# Patient Record
Sex: FEMALE | Race: WHITE | Employment: FULL TIME | URBAN - METROPOLITAN AREA
[De-identification: names, ages, dates, MRNs, and addresses within clinical notes are randomized per-mention and may not be internally consistent; named-entity substitution may affect disease eponyms.]

---

## 2017-03-05 ENCOUNTER — HOSPITAL ENCOUNTER (EMERGENCY)
Age: 27
Discharge: HOME | End: 2017-03-06
Attending: EMERGENCY MEDICINE
Payer: COMMERCIAL

## 2017-03-05 DIAGNOSIS — Z20.5 EXPOSURE TO HEPATITIS C: ICD-10-CM

## 2017-03-05 PROCEDURE — 99282 EMERGENCY DEPT VISIT SF MDM: CPT

## 2017-03-06 VITALS
WEIGHT: 155 LBS | OXYGEN SATURATION: 100 % | HEART RATE: 115 BPM | BODY MASS INDEX: 26.46 KG/M2 | HEIGHT: 64 IN | SYSTOLIC BLOOD PRESSURE: 139 MMHG | DIASTOLIC BLOOD PRESSURE: 93 MMHG | TEMPERATURE: 97 F | RESPIRATION RATE: 18 BRPM

## 2017-03-06 PROCEDURE — 90715 TDAP VACCINE 7 YRS/> IM: CPT | Performed by: EMERGENCY MEDICINE

## 2017-03-06 PROCEDURE — 90471 IMMUNIZATION ADMIN: CPT | Performed by: EMERGENCY MEDICINE

## 2017-03-06 PROCEDURE — 6360000002 HC RX W HCPCS: Performed by: EMERGENCY MEDICINE

## 2017-03-06 RX ADMIN — TETANUS TOXOID, REDUCED DIPHTHERIA TOXOID AND ACELLULAR PERTUSSIS VACCINE, ADSORBED 0.5 ML: 5; 2.5; 8; 8; 2.5 SUSPENSION INTRAMUSCULAR at 00:29

## 2017-03-06 ASSESSMENT — ENCOUNTER SYMPTOMS
VOMITING: 0
ABDOMINAL PAIN: 0
SORE THROAT: 0
NAUSEA: 0
CHEST TIGHTNESS: 0
CONSTIPATION: 0
SHORTNESS OF BREATH: 0
DIARRHEA: 0

## 2017-03-15 ENCOUNTER — EMPLOYEE WELLNESS (OUTPATIENT)
Dept: OTHER | Age: 27
End: 2017-03-15

## 2017-03-15 LAB
CHOLESTEROL/HDL RATIO: 2.8
CHOLESTEROL: 150 MG/DL
GLUCOSE BLD-MCNC: 87 MG/DL (ref 70–99)
HDLC SERPL-MCNC: 53 MG/DL
LDL CHOLESTEROL: 81 MG/DL (ref 0–130)
PATIENT FASTING?: YES
TRIGL SERPL-MCNC: 79 MG/DL
VLDLC SERPL CALC-MCNC: NORMAL MG/DL (ref 1–30)

## 2018-03-20 VITALS — WEIGHT: 158 LBS | BODY MASS INDEX: 27.12 KG/M2

## 2018-05-14 ENCOUNTER — EMPLOYEE WELLNESS (OUTPATIENT)
Dept: OTHER | Age: 28
End: 2018-05-14

## 2018-05-14 LAB
CHOLESTEROL/HDL RATIO: 2.7
CHOLESTEROL: 148 MG/DL
GLUCOSE BLD-MCNC: 74 MG/DL (ref 70–99)
HDLC SERPL-MCNC: 54 MG/DL
LDL CHOLESTEROL: 74 MG/DL (ref 0–130)
PATIENT FASTING?: YES
TRIGL SERPL-MCNC: 99 MG/DL
VLDLC SERPL CALC-MCNC: NORMAL MG/DL (ref 1–30)

## 2018-05-21 VITALS — WEIGHT: 153 LBS | BODY MASS INDEX: 26.26 KG/M2

## 2018-11-05 ENCOUNTER — HOSPITAL ENCOUNTER (EMERGENCY)
Age: 28
Discharge: HOME | End: 2018-11-05
Attending: EMERGENCY MEDICINE
Payer: COMMERCIAL

## 2018-11-05 ENCOUNTER — NURSE TRIAGE (OUTPATIENT)
Dept: OTHER | Facility: CLINIC | Age: 28
End: 2018-11-05

## 2018-11-05 VITALS
WEIGHT: 155 LBS | SYSTOLIC BLOOD PRESSURE: 147 MMHG | HEART RATE: 84 BPM | DIASTOLIC BLOOD PRESSURE: 81 MMHG | TEMPERATURE: 98.1 F | OXYGEN SATURATION: 100 % | BODY MASS INDEX: 26.61 KG/M2 | RESPIRATION RATE: 15 BRPM

## 2018-11-05 DIAGNOSIS — S31.41XA LACERATION OF LABIA MAJORA, INITIAL ENCOUNTER: Primary | ICD-10-CM

## 2018-11-05 PROCEDURE — 6370000000 HC RX 637 (ALT 250 FOR IP): Performed by: PHYSICIAN ASSISTANT

## 2018-11-05 PROCEDURE — 99283 EMERGENCY DEPT VISIT LOW MDM: CPT

## 2018-11-05 RX ADMIN — MICROFIBRILLAR COLLAGEN HEMOSTAT POWDER: POWDER at 20:20

## 2018-11-05 ASSESSMENT — ENCOUNTER SYMPTOMS
BACK PAIN: 0
COUGH: 0
SHORTNESS OF BREATH: 0
VOMITING: 0
EYE REDNESS: 0
EYE DISCHARGE: 0
ABDOMINAL PAIN: 0
NAUSEA: 0
SORE THROAT: 0

## 2018-11-06 NOTE — TELEPHONE ENCOUNTER
Reason for Disposition   [1] Bleeding AND [2] won't stop after 10 minutes of direct pressure (using correct technique)    Protocols used: SKIN INJURY-ADULT-      Patient is calling back because the cut on her vagina is still bleeding after applying direct pressure for 10+ minutes. All information documented in this encounter was provided by the caller/patient via the telephone.

## 2018-11-06 NOTE — ED PROVIDER NOTES
[facility] ED  [address] [place] [address]  Phone: [phone]  Fax: [phone]      Attending Physician Attestation    I performed a history and physical examination of the patient and discussed management with the mid level provider. I reviewed the mid level provider's note and agree with the documented findings and plan of care. Any areas of disagreement are noted on the chart. I was personally present for the key portions of any procedures. I have documented in the chart those procedures where I was not present during the key portions. I have reviewed the emergency nurses triage note. I agree with the chief complaint, past medical history, past surgical history, allergies, medications, social and family history as documented unless otherwise noted below. Documentation of the HPI, Physical Exam and Medical Decision Making performed by mid level providers is based on my personal performance of the HPI, PE and MDM. For Physician Assistant/ Nurse Practitioner cases/documentation I have personally evaluated this patient and have completed at least one if not all key elements of the E/M (history, physical exam, and MDM). Additional findings are as noted. CHIEF COMPLAINT       Chief Complaint   Patient presents with    Laceration     vagina         HISTORY OF PRESENT ILLNESS    Macey Loya is a 25 y.o. female who presents complaining of laceration to vaginal region after trimming her hair. Patient relates that it just when some bleeding and the patient came in for evaluation. She relates that it really doesn't hurt too much at this point time. No other concerns at this time. PAST MEDICAL HISTORY    has no past medical history on file. SURGICAL HISTORY      has a past surgical history that includes [place] tooth extraction. CURRENT MEDICATIONS       Previous Medications    No medications on file       ALLERGIES     has No Known Allergies.     FAMILY HISTORY

## 2019-02-22 ENCOUNTER — OFFICE VISIT (OUTPATIENT)
Dept: OBGYN CLINIC | Age: 29
End: 2019-02-22
Payer: COMMERCIAL

## 2019-02-22 VITALS
SYSTOLIC BLOOD PRESSURE: 132 MMHG | WEIGHT: 159.8 LBS | BODY MASS INDEX: 27.28 KG/M2 | HEIGHT: 64 IN | DIASTOLIC BLOOD PRESSURE: 86 MMHG

## 2019-02-22 DIAGNOSIS — Z30.09 ENCOUNTER FOR COUNSELING REGARDING CONTRACEPTION: ICD-10-CM

## 2019-02-22 DIAGNOSIS — Z01.419 ENCOUNTER FOR GYNECOLOGICAL EXAMINATION: Primary | ICD-10-CM

## 2019-02-22 PROCEDURE — 99385 PREV VISIT NEW AGE 18-39: CPT | Performed by: NURSE PRACTITIONER

## 2019-02-22 RX ORDER — NORETHINDRONE ACETATE AND ETHINYL ESTRADIOL 1MG-20(21)
1 KIT ORAL DAILY
Qty: 1 PACKET | Refills: 3 | Status: SHIPPED | OUTPATIENT
Start: 2019-02-22 | End: 2019-05-28 | Stop reason: SDUPTHER

## 2019-02-22 ASSESSMENT — ENCOUNTER SYMPTOMS
ABDOMINAL DISTENTION: 0
COUGH: 0
ABDOMINAL PAIN: 0
CONSTIPATION: 0
SHORTNESS OF BREATH: 0
DIARRHEA: 0
BACK PAIN: 0

## 2019-05-28 ENCOUNTER — OFFICE VISIT (OUTPATIENT)
Dept: OBGYN CLINIC | Age: 29
End: 2019-05-28
Payer: COMMERCIAL

## 2019-05-28 VITALS
BODY MASS INDEX: 27.01 KG/M2 | SYSTOLIC BLOOD PRESSURE: 100 MMHG | DIASTOLIC BLOOD PRESSURE: 66 MMHG | WEIGHT: 158.2 LBS | HEIGHT: 64 IN

## 2019-05-28 DIAGNOSIS — Z30.41 ENCOUNTER FOR SURVEILLANCE OF CONTRACEPTIVE PILLS: ICD-10-CM

## 2019-05-28 DIAGNOSIS — N94.6 DYSMENORRHEA: Primary | ICD-10-CM

## 2019-05-28 PROCEDURE — 99213 OFFICE O/P EST LOW 20 MIN: CPT | Performed by: NURSE PRACTITIONER

## 2019-05-28 RX ORDER — NORETHINDRONE ACETATE AND ETHINYL ESTRADIOL 1MG-20(21)
1 KIT ORAL DAILY
Qty: 1 PACKET | Refills: 9 | Status: SHIPPED | OUTPATIENT
Start: 2019-05-28 | End: 2019-07-14 | Stop reason: SDUPTHER

## 2019-05-28 SDOH — HEALTH STABILITY: MENTAL HEALTH: HOW OFTEN DO YOU HAVE A DRINK CONTAINING ALCOHOL?: 2-4 TIMES A MONTH

## 2019-05-28 SDOH — HEALTH STABILITY: MENTAL HEALTH: HOW MANY STANDARD DRINKS CONTAINING ALCOHOL DO YOU HAVE ON A TYPICAL DAY?: 1 OR 2

## 2019-05-28 ASSESSMENT — ENCOUNTER SYMPTOMS
ABDOMINAL PAIN: 0
ABDOMINAL DISTENTION: 0
CONSTIPATION: 0
DIARRHEA: 0
COUGH: 0
BACK PAIN: 0
SHORTNESS OF BREATH: 0

## 2019-05-28 NOTE — PROGRESS NOTES
Encounter for surveillance of contraceptive pills            Plan:    Refill OCPs  RTO annual exam and prn  No follow-ups on file.         Kelley Jacome, LENNY - CNP

## 2019-06-17 RX ORDER — NORETHINDRONE ACETATE AND ETHINYL ESTRADIOL, AND FERROUS FUMARATE 1MG-20(24)
KIT ORAL
Qty: 28 TABLET | Refills: 0 | Status: SHIPPED | OUTPATIENT
Start: 2019-06-17 | End: 2019-06-19 | Stop reason: SDUPTHER

## 2019-06-19 RX ORDER — NORETHINDRONE ACETATE AND ETHINYL ESTRADIOL AND FERROUS FUMARATE 1MG-20(24)
KIT ORAL
Qty: 28 TABLET | Refills: 9 | Status: SHIPPED | OUTPATIENT
Start: 2019-06-19 | End: 2020-02-24

## 2019-06-19 NOTE — TELEPHONE ENCOUNTER
Patient is requesting a refill on birth control. Patient was just here on 05/28/19 and 02/22/19. Please refill.

## 2019-07-15 RX ORDER — NORETHINDRONE ACETATE AND ETHINYL ESTRADIOL 1MG-20(21)
1 KIT ORAL DAILY
Qty: 1 PACKET | Refills: 9 | Status: SHIPPED | OUTPATIENT
Start: 2019-07-15 | End: 2020-02-24 | Stop reason: SDUPTHER

## 2020-02-24 ENCOUNTER — HOSPITAL ENCOUNTER (OUTPATIENT)
Age: 30
Setting detail: SPECIMEN
Discharge: HOME | End: 2020-02-24
Payer: COMMERCIAL

## 2020-02-24 ENCOUNTER — OFFICE VISIT (OUTPATIENT)
Dept: OBGYN CLINIC | Age: 30
End: 2020-02-24
Payer: COMMERCIAL

## 2020-02-24 VITALS
HEIGHT: 64 IN | BODY MASS INDEX: 28.95 KG/M2 | SYSTOLIC BLOOD PRESSURE: 114 MMHG | DIASTOLIC BLOOD PRESSURE: 76 MMHG | WEIGHT: 169.6 LBS

## 2020-02-24 PROCEDURE — 99395 PREV VISIT EST AGE 18-39: CPT | Performed by: NURSE PRACTITIONER

## 2020-02-24 RX ORDER — NORETHINDRONE ACETATE AND ETHINYL ESTRADIOL 1MG-20(21)
1 KIT ORAL DAILY
Qty: 1 PACKET | Refills: 12 | Status: SHIPPED | OUTPATIENT
Start: 2020-02-24 | End: 2021-01-25

## 2020-02-24 ASSESSMENT — ENCOUNTER SYMPTOMS
COUGH: 0
ABDOMINAL PAIN: 0
DIARRHEA: 0
ABDOMINAL DISTENTION: 0
BACK PAIN: 0
CONSTIPATION: 0
SHORTNESS OF BREATH: 0

## 2020-02-24 NOTE — PROGRESS NOTES
HPI:     Jr Houser a 30 y.o. female who presents today for:  Chief Complaint   Patient presents with    Annual Exam     first pap        HPI  Here for annual exam. Works as an  for [employer]. No children. Works out about 4-5x/week and eats healthy. Has become sexually active in the last year  GYNECOLOGICHISTORY:  MenstrualHistory: Patient's last menstrual period was 02/19/2020. Sexually Transmitted Infections: No  History of Ectopic Pregnancy:No  Menarche: 10  Has had about 4-5 cycles in the last year- previously (prior to pills) were very irregular as well. Durationof cycles: 2-5  Dysmenorrhea: occasionally  Sexually active: yes  Dyspareunia: no    Current Outpatient Medications   Medication Sig Dispense Refill    norethindrone-ethinyl estradiol (JUNEL FE 1/20) 1-20 MG-MCG per tablet Take 1 tablet by mouth daily 1 packet 9     No current facility-administered medications for this visit. No Known Allergies    History reviewed. No pertinent past medical history. Denies family history of breast, ovarian, uterus, colon CA     Past Surgical History:   Procedure Laterality Date    WISDOM TOOTH EXTRACTION       Family History   Problem Relation Age of Onset    High Cholesterol Mother     High Cholesterol Father     Alzheimer's Disease Maternal Grandmother     Heart Attack Maternal Grandfather     No Known Problems Paternal Grandmother     No Known Problems Paternal Grandfather      Social History     Tobacco Use    Smoking status: Never Smoker    Smokeless tobacco: Never Used   Substance Use Topics    Alcohol use: Yes     Frequency: 2-4 times a month     Drinks per session: 1 or 2        Subjective:      Review of Systems   Constitutional: Negative for appetite change and fatigue. HENT: Negative for congestion and hearing loss. Eyes: Negative for visual disturbance. Respiratory: Negative for cough and shortness of breath.     Cardiovascular: Negative for chest pain and Status: She is alert and oriented to person, place, and time. Psychiatric:         Behavior: Behavior normal.         Thought Content: Thought content normal.         Judgment: Judgment normal.           Assessment:     1. Encounter for gynecological examination          Plan:   1. Pap collected. Discussed new pap smear guidelines. Desires re-pap in 1 year. 2. Breast self exam reviewed  3. Calcium and Vitamin D dosing reviewed. 4. Seat belt use reviewed  5. Family planning reviewed.   Birth control ocp    Electronicallysigned by Melvin Melendez on 2/24/2020

## 2020-03-04 LAB — CYTOLOGY REPORT: NORMAL

## 2020-03-07 LAB
HPV SAMPLE: NORMAL
HPV, GENOTYPE 16: NOT DETECTED
HPV, GENOTYPE 18: NOT DETECTED
HPV, HIGH RISK OTHER: NOT DETECTED
HPV, INTERPRETATION: NORMAL
SPECIMEN DESCRIPTION: NORMAL

## 2020-06-19 ENCOUNTER — OFFICE VISIT (OUTPATIENT)
Dept: FAMILY MEDICINE CLINIC | Age: 30
End: 2020-06-19
Payer: COMMERCIAL

## 2020-06-19 VITALS
DIASTOLIC BLOOD PRESSURE: 85 MMHG | OXYGEN SATURATION: 99 % | HEART RATE: 62 BPM | TEMPERATURE: 97.9 F | SYSTOLIC BLOOD PRESSURE: 123 MMHG

## 2020-06-19 PROCEDURE — 99202 OFFICE O/P NEW SF 15 MIN: CPT | Performed by: NURSE PRACTITIONER

## 2020-06-19 RX ORDER — TRIAMCINOLONE ACETONIDE 1 MG/G
CREAM TOPICAL
Qty: 80 G | Refills: 0 | Status: SHIPPED | OUTPATIENT
Start: 2020-06-19 | End: 2021-03-04

## 2020-06-19 RX ORDER — PREDNISONE 20 MG/1
40 TABLET ORAL DAILY
Qty: 10 TABLET | Refills: 0 | Status: SHIPPED | OUTPATIENT
Start: 2020-06-19 | End: 2020-06-24

## 2020-06-19 ASSESSMENT — PATIENT HEALTH QUESTIONNAIRE - PHQ9
SUM OF ALL RESPONSES TO PHQ9 QUESTIONS 1 & 2: 0
1. LITTLE INTEREST OR PLEASURE IN DOING THINGS: 0
2. FEELING DOWN, DEPRESSED OR HOPELESS: 0
SUM OF ALL RESPONSES TO PHQ QUESTIONS 1-9: 0
SUM OF ALL RESPONSES TO PHQ QUESTIONS 1-9: 0

## 2020-06-19 ASSESSMENT — ENCOUNTER SYMPTOMS
COUGH: 0
SHORTNESS OF BREATH: 0
CHEST TIGHTNESS: 0
WHEEZING: 0
RESPIRATORY NEGATIVE: 1

## 2020-06-19 NOTE — PATIENT INSTRUCTIONS
X Pre-Contact Skin Solution, come in lotions, sprays, or towelettes. You put the product on your skin right before you go outdoors. · If you did not use a preventive product and you have had contact with plant oil, clean it off your skin as soon as possible. Use a product such as Tecnu Original Outdoor Skin Cleanser. These products can also be used to clean plant oil from clothing or tools. When should you call for help? Call your doctor now or seek immediate medical care if:  · Your rash gets worse, and you start to feel bad and have a fever, a stiff neck, nausea, and vomiting. · You have signs of infection, such as:  ? Increased pain, swelling, warmth, or redness. ? Red streaks leading from the rash. ? Pus draining from the rash. ? A fever. Watch closely for changes in your health, and be sure to contact your doctor if:  · You have new blisters or bruises, or the rash spreads and looks like a sunburn. · The rash gets worse, or it comes back after nearly disappearing. · You think a medicine you are using is making your rash worse. · Your rash does not clear up after 1 to 2 weeks of home treatment. · You have joint aches or body aches with your rash. Where can you learn more? Go to https://ThisClicks.VOZ. org and sign in to your [employer] account. Enter R240 in the [facility] box to learn more about \"[place], [place], and [place]: Care Instructions. \"     If you do not have an account, please click on the \"Sign Up Now\" link. Current as of: October 31, 2019               Content Version: 12.5  © 2926-2832 Healthwise, Incorporated. Care instructions adapted under license by [facility] ([facility]). If you have questions about a medical condition or this instruction, always ask your healthcare professional. [address]  any warranty or liability for your use of this information.

## 2020-06-24 ENCOUNTER — PATIENT MESSAGE (OUTPATIENT)
Dept: FAMILY MEDICINE CLINIC | Age: 30
End: 2020-06-24

## 2020-06-25 RX ORDER — PREDNISONE 10 MG/1
TABLET ORAL
Qty: 30 TABLET | Refills: 0 | Status: SHIPPED | OUTPATIENT
Start: 2020-06-25 | End: 2021-03-04

## 2020-07-13 ENCOUNTER — EMPLOYEE WELLNESS (OUTPATIENT)
Dept: OTHER | Age: 30
End: 2020-07-13

## 2020-07-13 LAB
CHOLESTEROL/HDL RATIO: 4
CHOLESTEROL: 182 MG/DL
GLUCOSE BLD-MCNC: 73 MG/DL (ref 70–99)
HDLC SERPL-MCNC: 45 MG/DL
LDL CHOLESTEROL: 117 MG/DL (ref 0–130)
PATIENT FASTING?: YES
TRIGL SERPL-MCNC: 99 MG/DL
VLDLC SERPL CALC-MCNC: NORMAL MG/DL (ref 1–30)

## 2020-10-19 VITALS — WEIGHT: 163 LBS | BODY MASS INDEX: 28.42 KG/M2

## 2021-03-04 ENCOUNTER — HOSPITAL ENCOUNTER (OUTPATIENT)
Age: 31
Setting detail: SPECIMEN
Discharge: HOME | End: 2021-03-04
Payer: COMMERCIAL

## 2021-03-04 ENCOUNTER — OFFICE VISIT (OUTPATIENT)
Dept: OBGYN CLINIC | Age: 31
End: 2021-03-04
Payer: COMMERCIAL

## 2021-03-04 VITALS
SYSTOLIC BLOOD PRESSURE: 104 MMHG | DIASTOLIC BLOOD PRESSURE: 74 MMHG | WEIGHT: 166.6 LBS | BODY MASS INDEX: 28.44 KG/M2 | HEIGHT: 64 IN | TEMPERATURE: 98.2 F

## 2021-03-04 DIAGNOSIS — Z01.419 ENCOUNTER FOR GYNECOLOGICAL EXAMINATION: Primary | ICD-10-CM

## 2021-03-04 DIAGNOSIS — Z30.41 ORAL CONTRACEPTIVE PILL SURVEILLANCE: ICD-10-CM

## 2021-03-04 PROCEDURE — 99395 PREV VISIT EST AGE 18-39: CPT | Performed by: NURSE PRACTITIONER

## 2021-03-04 RX ORDER — NORETHINDRONE ACETATE AND ETHINYL ESTRADIOL 1MG-20(21)
KIT ORAL
Qty: 1 PACKET | Refills: 12 | Status: SHIPPED | OUTPATIENT
Start: 2021-03-04 | End: 2021-03-19

## 2021-03-04 ASSESSMENT — ENCOUNTER SYMPTOMS
BACK PAIN: 0
ABDOMINAL PAIN: 0
COUGH: 0
ABDOMINAL DISTENTION: 0
SHORTNESS OF BREATH: 0
CONSTIPATION: 0
DIARRHEA: 0

## 2021-03-04 NOTE — PROGRESS NOTES
HPI:     Kofi Mcintyre a 26 y.o. female who presents today for:  Chief Complaint   Patient presents with    Annual Exam     last pap 2/24/20-ASCUS HPV-     Medication Refill     refill OCP        HPI  Here for annual exam. Works as an  for [employer]. No children. Tries to eat healthy and works out 5x/week. Getting  in June  The patient denies any family member or herself as having a blood clot in their leg, lung, or brain. She denies that any member of her family has had a sudden cardiac death under the age of 47 yo. GYNECOLOGICHISTORY:  MenstrualHistory: Patient's last menstrual period was 02/12/2021. Sexually Transmitted Infections: No  History of Ectopic Pregnancy:No  Menarche: 10  Cycles q  monthly  Durationof cycles: 4-6  Dysmenorrhea: minimal  Sexually active: yes  Dyspareunia: none    Current Outpatient Medications   Medication Sig Dispense Refill    BLISOVI FE 1/20 1-20 MG-MCG per tablet TAKE 1 TABLET BY MOUTH ONE TIME A DAY 1 packet 1     No current facility-administered medications for this visit. No Known Allergies    History reviewed. No pertinent past medical history. Denies family history of breast, ovarian, uterus, colon CA     Past Surgical History:   Procedure Laterality Date    WISDOM TOOTH EXTRACTION       Family History   Problem Relation Age of Onset    High Cholesterol Mother     High Cholesterol Father     Alzheimer's Disease Maternal Grandmother     Heart Attack Maternal Grandfather     No Known Problems Paternal Grandmother     No Known Problems Paternal Grandfather      Social History     Tobacco Use    Smoking status: Never Smoker    Smokeless tobacco: Never Used   Substance Use Topics    Alcohol use: Yes     Frequency: 2-4 times a month     Drinks per session: 1 or 2        Subjective:      Review of Systems   Constitutional: Negative for appetite change and fatigue. HENT: Negative for congestion and hearing loss. Eyes: Negative for visual disturbance. Respiratory: Negative for cough and shortness of breath. Cardiovascular: Negative for chest pain and palpitations. Gastrointestinal: Negative for abdominal distention, abdominal pain, constipation and diarrhea. Genitourinary: Negative for flank pain, frequency, menstrual problem, pelvic pain and vaginal discharge. Musculoskeletal: Negative for back pain. Neurological: Negative for syncope and headaches. Psychiatric/Behavioral: Negative for behavioral problems. Objective:     /74 (Site: Right Upper Arm, Position: Sitting, Cuff Size: Medium Adult)   Temp 98.2 °F (36.8 °C)   Ht 5' 3.5\" (1.613 m)   Wt 166 lb 9.6 oz (75.6 kg)   LMP 02/12/2021   Breastfeeding No   BMI 29.05 kg/m²   Physical Exam  Constitutional:       Appearance: She is well-developed. HENT:      Head: Normocephalic. Eyes:      Extraocular Movements: Extraocular movements intact. Conjunctiva/sclera: Conjunctivae normal.   Neck:      Musculoskeletal: Normal range of motion. Thyroid: No thyromegaly. Trachea: No tracheal deviation. Pulmonary:      Effort: Pulmonary effort is normal. No respiratory distress. Chest:      Breasts: Breasts are symmetrical.         Right: No inverted nipple. Left: No inverted nipple, mass, nipple discharge, skin change or tenderness. Abdominal:      General: There is no distension. Palpations: Abdomen is soft. There is no mass. Tenderness: There is no abdominal tenderness. Genitourinary:     Labia:         Right: No rash or lesion. Left: No rash or lesion. Vagina: No vaginal discharge or tenderness. Cervix: No cervical motion tenderness, discharge or friability. Uterus: Not deviated, not enlarged and not fixed. Adnexa:         Right: No mass, tenderness or fullness. Left: No mass, tenderness or fullness. Musculoskeletal: Normal range of motion. General: No tenderness. Skin:     General: Skin is warm and dry. Neurological:      General: No focal deficit present. Mental Status: She is alert and oriented to person, place, and time. Mental status is at baseline. Psychiatric:         Mood and Affect: Mood normal.         Behavior: Behavior normal.         Thought Content: Thought content normal.         Judgment: Judgment normal.           Assessment:     1. Encounter for gynecological examination    2. Oral contraceptive pill surveillance          Plan:   1. Pap collected. Discussed new pap smear guidelines. Desires re-pap in 1 year. 2. Breast self exam reviewed  3. Calcium and Vitamin D dosing reviewed. 4. Seat belt use reviewed  5. Family planning reviewed.   Birth control ocp    Electronicallysigned by Jadiel Curtis on 3/4/2021

## 2021-03-13 LAB — CYTOLOGY REPORT: NORMAL

## 2021-03-19 RX ORDER — NORETHINDRONE ACETATE AND ETHINYL ESTRADIOL 1MG-20(21)
KIT ORAL
Qty: 1 PACKET | Refills: 1 | Status: SHIPPED | OUTPATIENT
Start: 2021-03-19 | End: 2021-05-14

## 2021-05-14 RX ORDER — NORETHINDRONE ACETATE AND ETHINYL ESTRADIOL 1MG-20(21)
KIT ORAL
Qty: 1 PACKET | Refills: 6 | Status: SHIPPED | OUTPATIENT
Start: 2021-05-14 | End: 2021-12-09

## 2021-07-27 LAB
CHOLESTEROL/HDL RATIO: 3.3
CHOLESTEROL: 184 MG/DL
GLUCOSE BLD-MCNC: 89 MG/DL (ref 70–99)
HDLC SERPL-MCNC: 56 MG/DL
LDL CHOLESTEROL: 105 MG/DL (ref 0–130)
PATIENT FASTING?: YES
TRIGL SERPL-MCNC: 114 MG/DL
VLDLC SERPL CALC-MCNC: NORMAL MG/DL (ref 1–30)

## 2021-12-08 ASSESSMENT — ENCOUNTER SYMPTOMS
SHORTNESS OF BREATH: 0
ABDOMINAL PAIN: 0
DIARRHEA: 0
BACK PAIN: 0
CONSTIPATION: 0
ABDOMINAL DISTENTION: 0
COUGH: 0

## 2021-12-09 ENCOUNTER — OFFICE VISIT (OUTPATIENT)
Dept: OBGYN CLINIC | Age: 31
End: 2021-12-09
Payer: COMMERCIAL

## 2021-12-09 ENCOUNTER — HOSPITAL ENCOUNTER (OUTPATIENT)
Age: 31
Setting detail: SPECIMEN
Discharge: HOME | End: 2021-12-09

## 2021-12-09 VITALS
HEIGHT: 64 IN | WEIGHT: 172.2 LBS | BODY MASS INDEX: 29.4 KG/M2 | SYSTOLIC BLOOD PRESSURE: 118 MMHG | DIASTOLIC BLOOD PRESSURE: 78 MMHG

## 2021-12-09 DIAGNOSIS — Z32.01 POSITIVE PREGNANCY TEST: ICD-10-CM

## 2021-12-09 DIAGNOSIS — Z32.01 POSITIVE PREGNANCY TEST: Primary | ICD-10-CM

## 2021-12-09 LAB
CONTROL: PRESENT
PREGNANCY TEST URINE, POC: POSITIVE

## 2021-12-09 PROCEDURE — 99213 OFFICE O/P EST LOW 20 MIN: CPT | Performed by: NURSE PRACTITIONER

## 2021-12-09 PROCEDURE — 81025 URINE PREGNANCY TEST: CPT | Performed by: NURSE PRACTITIONER

## 2021-12-09 NOTE — PROGRESS NOTES
Cassandra White is a 33 y.o. Opal Prudent at Unknown with Estimated Date of Delivery: None noted. who presents for prenatal care. This is a planned pregnancy : Yes  No LMP recorded. Certain LMP : yes      Pregnancy symptoms include fatigue, breast tenderness, nausea, \"morning sickness\", positive home pregnancy test and frequent urination  nausea without vomiting for 20 days  full time job doing IT for [employer]  Pain Score   0/10  Partner's name- Ad Ny  Relationship with FOB: spouse, living together. Patientdoes intend to breast feed. Pregnancy history fully reviewed. Mother's ethnicity:   Father's ethnicity:    Has had both Covid shots. Planning on getting booster in 2nd trimester      POB:   OB History    Para Term  AB Living   0 0 0 0 0 0   SAB IAB Ectopic Molar Multiple Live Births   0 0 0 0 0 0     Complications from previous pregnancies/deliveries  NA  PGYN: denies STDs; denies abnormal pap smears                      Menses regular yes  Contraception none    PMH:  has no past medical history on file. PSH:  has a past surgical history that includes [place] tooth extraction. FH:family history includes Alzheimer's Disease in her maternal grandmother; Heart Attack in her maternal grandfather; High Cholesterol in her father and mother; No Known Problems in her paternal grandfather and paternal grandmother. SH: denies X 3, family supportive  reports that she has never smoked. She has never used smokeless tobacco. She reports current alcohol use. She reports that she does not use drugs. Review of Systems   Constitutional: Negative for appetite change and fatigue. HENT: Negative for congestion and hearing loss. Eyes: Negative for visual disturbance. Respiratory: Negative for cough and shortness of breath. Cardiovascular: Negative for chest pain and palpitations. Gastrointestinal: Negative for abdominal distention, abdominal pain, constipation and diarrhea. Genitourinary: Negative for flank pain, frequency, menstrual problem, pelvic pain and vaginal discharge. Musculoskeletal: Negative for back pain. Neurological: Negative for syncope and headaches. Psychiatric/Behavioral: Negative for behavioral problems. Physical exam:There were no vitals taken for this visit. General Appearance: alert and oriented to person,place and time, well developed and well- nourished, in no acute distress  Skin: warm and dry, no rash or erythema  Head: normocephalic and atraumatic  Eyes: extraocular eye movements intact, conjunctivae normal  ENT:  external ear and ear canal normal bilaterally, nose without deformity, nasal mucosa normal   Neck: supple and non-tender without mass,   Pulmonary/Chest: pulmonary effort wnl  Abdomen: soft, non-tender, non-distended, no masses or organomegaly  Extremities: no cyanosis, clubbing or edema  Musculoskeletal: normal rangeof motion, no joint swelling, deformity or tenderness  Neurologic: gait, coordination and speech normal  Breast: without skin retraction, dimpling, puckering, nipple discharge or masses. There is no axillary adenopathy      Pelvic: external genitalia WNL's, no rashes, no lesions. Speculum exam: vaginal vault pink, wellrugated, without lesions. No discharge. Cervix without lesions. Bimanual exam: no cervical motion tenderness. Impression: @ Unknown by LMP             There is no problem list on file for this patient. Plan:     The patient was seen full history and physical was completed/reviewed.     - Prenatal labs ordered   - Prenatal vitamins prescription Given   - Problem list reviewed and updated   - NT Screen/Quad Testing and MSAFP Single Marker: undecided, lab undecided   - Role of ultrasound in pregnancy discussed; requests fetal survey, MFM referral not indicated   - Gc/Chlam Cultures & Wet Prep Collected, results ordered   - Pap Smear previuosly  Counseling Completed: The patient was counseled on office policies and she was counseled on termination of pregnancy in the state of [place].  labor precautions were reviewed and she is to contact the office if she experiences vaginal bleeding, leakage of fluid or abdominal pain. The patient was counseled on Toxoplasmosis, HIV, Tobacco Abuse, Group Beta Strep Infections, Cystic Fibrosis,  Labor precautions and Sickle Cell disease. Her medication list was reviewed along with the need to clarify if new medications prescribed or used are okay in pregnancy before taking them. The patient was counseled on the risks of tobacco abuse. Both maternal and fetal. She was instructed to stop smoking if currently using tobacco. Morbidity, mortality, and cessation programs were reviewed. The risks include but are not limited to increased risks of  labor,  delivery, premature rupture of membranes, intrauterine growth restriction, intrauterine fetal demise and abruptio placenta. Secondary smoke risks were also reviewed. Increases in cancer, respiratory problems, and sudden infant death syndrome were reviewed as well. The patient was informed of a 2-4% risk of congenital anomalies in the general population. She was also informed that karyotyping is the only way to evaluate the fetus for genetic problems and genetic lethal anomalies. Chorionic villous sampling, amniocentesis and VeriFi were also discussed with morbidity rates in detail. She undecided the procedure. Route of delivery and counseling on vaginal, operative vaginal, and  sections were completed with the risks of each to both the patient as well as her baby. The possibility of a blood transfusion was discussed as well. The patient was not opposed to receiving a transfusion if needed.      Nuchal translucency/Quad Evaluation and MSAFP single marker testing was reviewed in detail with attention to timing of testing and their windows. For patients beyond the gestational age for Nuchal translucency evaluation Quad testing was recommended. Timing for the Quad test was reviewed. Benefits of the above testing was reviewed. A second trimester amniocentesis was also made available to the patient. Risks, Benefits and non-invasive alternative testing was reviewed. T-dap Vaccine recommendations reviewed with the patient. Patient notified of timing of vaccination 27-36 weeks gestation. Patient aware Vaccine is not Live. - Precautions given for ectopic pregnancy including increased abdominal pain, vaginal bleeding. Patient knows to go to ED should these symptoms occur. No orders of the defined types were placed in this encounter.

## 2021-12-09 NOTE — PATIENT INSTRUCTIONS
Patient Education        Weeks 6 to 10 of Your Pregnancy: Care Instructions  Overview     During the first 6 to 10 weeks of your pregnancy, your body goes through many changes. Your baby grows very quickly, even though you can't feel it yet. You may start to feel different, both in your body and your emotions. Because each pregnancy is unique, there's no right way to feel. You may feel the healthiest you've ever been, or you might feel tired or sick to your stomach (\"morning sickness\"). These early weeks are a time to make healthy choices and to eat the best foods for you and your baby. This is also a good time to think about birth defects testing. These are tests done during pregnancy to look for possible problems with the baby. First-trimester tests for birth defects can be done between 8 and 13 weeks of pregnancy, depending on the test. Talk with your doctor about what kinds of tests are available. Follow-up care is a key part of your treatment and safety. Be sure to make and go to all appointments, and call your doctor if you are having problems. It's also a good idea to know your test results and keep a list of the medicines you take. How can you care for yourself at home? Eat well  · Eat at least 3 meals and 2 healthy snacks every day. Eat fresh, whole foods, including:  ? 7 or more servings of bread, tortillas, cereal, rice, pasta, or oatmeal.  ? 3 or more servings of vegetables, especially leafy green vegetables. ? 2 or more servings of fruits. ? 3 or more servings of milk, yogurt, or cheese. ? 2 or more servings of meat, turkey, chicken, fish, eggs, or dried beans. · Drink plenty of fluids, especially water. Avoid sodas and other sweetened drinks. · Choose foods that have important vitamins for your baby, such as calcium, iron, and folate.   ? Dairy products, tofu, canned fish with bones, almonds, broccoli, dark leafy greens, corn tortillas, and fortified orange juice are good sources of calcium. ? Beef, poultry, liver, spinach, lentils, dried beans, fortified cereals, and dried fruits are rich in iron. ? Dark leafy greens, broccoli, asparagus, liver, fortified cereals, orange juice, peanuts, and almonds are good sources of folate. · Avoid foods that could harm your baby. ? Do not eat raw or undercooked meat, chicken, or fish (such as sushi or raw oysters). ? Do not eat raw eggs or foods that contain raw eggs, such as Caesar dressing. ? Do not eat soft cheeses and unpasteurized dairy foods, such as Brie, feta, or blue cheese. ? Do not eat fish that contains a lot of mercury, such as shark, swordfish, tilefish, or tess mackerel. Limit some other types of fish, such as white (albacore) tuna, to 4 oz (0.1 kg) a week. ? Do not eat raw sprouts, especially alfalfa sprouts. ? Cut down on caffeine, such as coffee, tea, and cola. Protect yourself and your baby  · Do not touch regulo litter or cat feces. They can cause an infection that could harm your baby. · High body temperature can be harmful to your baby. So if you want to use a sauna or hot tub, be sure to talk to your doctor about how to use it safely. Delta with morning sickness  · Sip small amounts of water, juices, or shakes. Try drinking between meals, not with meals. · Eat 5 or 6 small meals a day. Try dry toast or crackers when you first get up, and eat breakfast a little later. · Avoid spicy, greasy, and fatty foods. · When you feel sick, open your windows or go for a short walk to get fresh air. · Try nausea wristbands. These help some people. · Tell your doctor if you think your prenatal vitamins make you sick. Where can you learn more? Go to https://foreign.healthZoweeTV. org and sign in to your [employer] account. Enter G112 in the [employer] box to learn more about \"Weeks 6 to 10 of Your Pregnancy: Care Instructions. \"     If you do not have an account, please click on the \"Sign Up Now\" link.   Current as of: June 16, 2021               Content Version: 13.0  © 4136-2096 Healthwise, Incorporated. Care instructions adapted under license by [facility]. If you have questions about a medical condition or this instruction, always ask your healthcare professional. [address] any warranty or liability for your use of this information.

## 2021-12-10 LAB
C TRACH DNA GENITAL QL NAA+PROBE: NEGATIVE
DIRECT EXAM: ABNORMAL
Lab: ABNORMAL
N. GONORRHOEAE DNA: NEGATIVE
SPECIMEN DESCRIPTION: ABNORMAL
SPECIMEN DESCRIPTION: NORMAL

## 2021-12-10 RX ORDER — CLINDAMYCIN PHOSPHATE 20 MG/G
CREAM VAGINAL
Qty: 1 EACH | Refills: 0 | Status: SHIPPED | OUTPATIENT
Start: 2021-12-10 | End: 2022-01-21

## 2021-12-11 ENCOUNTER — HOSPITAL ENCOUNTER (OUTPATIENT)
Age: 31
Discharge: HOME | End: 2021-12-11
Payer: COMMERCIAL

## 2021-12-11 DIAGNOSIS — Z32.01 POSITIVE PREGNANCY TEST: ICD-10-CM

## 2021-12-11 LAB
ABO/RH: NORMAL
ABSOLUTE EOS #: 0.21 K/UL (ref 0–0.44)
ABSOLUTE IMMATURE GRANULOCYTE: 0.05 K/UL (ref 0–0.3)
ABSOLUTE LYMPH #: 2.17 K/UL (ref 1.1–3.7)
ABSOLUTE MONO #: 0.59 K/UL (ref 0.1–1.2)
AMPHETAMINE SCREEN URINE: NEGATIVE
ANTIBODY SCREEN: NEGATIVE
BARBITURATE SCREEN URINE: NEGATIVE
BASOPHILS # BLD: 1 % (ref 0–2)
BASOPHILS ABSOLUTE: 0.06 K/UL (ref 0–0.2)
BENZODIAZEPINE SCREEN, URINE: NEGATIVE
BILIRUBIN URINE: NEGATIVE
BUPRENORPHINE URINE: NORMAL
CANNABINOID SCREEN URINE: NEGATIVE
COCAINE METABOLITE, URINE: NEGATIVE
COLOR: YELLOW
COMMENT UA: NORMAL
DIFFERENTIAL TYPE: ABNORMAL
EOSINOPHILS RELATIVE PERCENT: 2 % (ref 1–4)
GLUCOSE URINE: NEGATIVE
HCT VFR BLD CALC: 42.5 % (ref 36.3–47.1)
HEMOGLOBIN: 13.6 G/DL (ref 11.9–15.1)
HEPATITIS B SURFACE ANTIGEN: NONREACTIVE
HEPATITIS C ANTIBODY: NONREACTIVE
HIV AG/AB: NONREACTIVE
IMMATURE GRANULOCYTES: 1 %
KETONES, URINE: NEGATIVE
LEUKOCYTE ESTERASE, URINE: NEGATIVE
LYMPHOCYTES # BLD: 22 % (ref 24–43)
MCH RBC QN AUTO: 28.2 PG (ref 25.2–33.5)
MCHC RBC AUTO-ENTMCNC: 32 G/DL (ref 28.4–34.8)
MCV RBC AUTO: 88.2 FL (ref 82.6–102.9)
MDMA URINE: NORMAL
METHADONE SCREEN, URINE: NEGATIVE
METHAMPHETAMINE, URINE: NORMAL
MONOCYTES # BLD: 6 % (ref 3–12)
NITRITE, URINE: NEGATIVE
NRBC AUTOMATED: 0 PER 100 WBC
OPIATES, URINE: NEGATIVE
OXYCODONE SCREEN URINE: NEGATIVE
PDW BLD-RTO: 11.9 % (ref 11.8–14.4)
PH UA: 7.5 (ref 5–8)
PHENCYCLIDINE, URINE: NEGATIVE
PLATELET # BLD: 292 K/UL (ref 138–453)
PLATELET ESTIMATE: ABNORMAL
PMV BLD AUTO: 9.4 FL (ref 8.1–13.5)
PROPOXYPHENE, URINE: NORMAL
PROTEIN UA: NEGATIVE
RBC # BLD: 4.82 M/UL (ref 3.95–5.11)
RBC # BLD: ABNORMAL 10*6/UL
RUBV IGG SER QL: 125.9 IU/ML
SEG NEUTROPHILS: 68 % (ref 36–65)
SEGMENTED NEUTROPHILS ABSOLUTE COUNT: 6.86 K/UL (ref 1.5–8.1)
SPECIFIC GRAVITY UA: 1.01 (ref 1–1.03)
T. PALLIDUM, IGG: NONREACTIVE
TEST INFORMATION: NORMAL
TRICYCLIC ANTIDEPRESSANTS, UR: NORMAL
TURBIDITY: CLEAR
URINE HGB: NEGATIVE
UROBILINOGEN, URINE: NORMAL
WBC # BLD: 9.9 K/UL (ref 3.5–11.3)
WBC # BLD: ABNORMAL 10*3/UL

## 2021-12-11 PROCEDURE — 86762 RUBELLA ANTIBODY: CPT

## 2021-12-11 PROCEDURE — 36415 COLL VENOUS BLD VENIPUNCTURE: CPT

## 2021-12-11 PROCEDURE — 86780 TREPONEMA PALLIDUM: CPT

## 2021-12-11 PROCEDURE — 85025 COMPLETE CBC W/AUTO DIFF WBC: CPT

## 2021-12-11 PROCEDURE — 81003 URINALYSIS AUTO W/O SCOPE: CPT

## 2021-12-11 PROCEDURE — 86803 HEPATITIS C AB TEST: CPT

## 2021-12-11 PROCEDURE — 81220 CFTR GENE COM VARIANTS: CPT

## 2021-12-11 PROCEDURE — 86901 BLOOD TYPING SEROLOGIC RH(D): CPT

## 2021-12-11 PROCEDURE — 80307 DRUG TEST PRSMV CHEM ANLYZR: CPT

## 2021-12-11 PROCEDURE — 87389 HIV-1 AG W/HIV-1&-2 AB AG IA: CPT

## 2021-12-11 PROCEDURE — 86850 RBC ANTIBODY SCREEN: CPT

## 2021-12-11 PROCEDURE — 87086 URINE CULTURE/COLONY COUNT: CPT

## 2021-12-11 PROCEDURE — 87340 HEPATITIS B SURFACE AG IA: CPT

## 2021-12-11 PROCEDURE — 86900 BLOOD TYPING SEROLOGIC ABO: CPT

## 2021-12-12 LAB
CULTURE: NORMAL
Lab: NORMAL
SPECIMEN DESCRIPTION: NORMAL

## 2021-12-14 LAB — CYSTIC FIBROSIS: NORMAL

## 2021-12-16 ENCOUNTER — INITIAL PRENATAL (OUTPATIENT)
Dept: OBGYN CLINIC | Age: 31
End: 2021-12-16

## 2021-12-16 DIAGNOSIS — Z3A.01 7 WEEKS GESTATION OF PREGNANCY: Primary | ICD-10-CM

## 2021-12-16 DIAGNOSIS — Z34.91 NORMAL PREGNANCY IN FIRST TRIMESTER: ICD-10-CM

## 2021-12-16 PROCEDURE — 0500F INITIAL PRENATAL CARE VISIT: CPT | Performed by: NURSE PRACTITIONER

## 2021-12-16 NOTE — PROGRESS NOTES
Relationship with FOB: , living together, no other children  Partner's name: Derick Tan \"Dimitry\"  Plans to Breast feeding  Pain Score:0/10  Job title: working IT at [employer]  This is a planned pregnancy:Yes  Certain LMP:Yes 64/14/48  S/S of pregnancy:Yes Didn't feel right, nauseated and 2wk late(which is normal for her)  Hx N/V pregnancy: nausea and no emesis       Mother's ethnicity:    Father's ethnicity:     -FHR US today 161  There is no problem list on file for this patient. Last menstrual period 10/09/2021, not currently breastfeeding. Fabiola aWlton is a 33 y.o. Valeria Chum, here for her ACOG. The patients past medical, surgical, social and family history were reviewed. Current medications and allergies were reviewed, and documented in the chart. Menstrual history: normally irregular  Birth control: BCP    Wt Readings from Last 3 Encounters:   12/09/21 172 lb 3.2 oz (78.1 kg)   03/04/21 166 lb 9.6 oz (75.6 kg)   07/13/20 163 lb (73.9 kg)     Recent Results (from the past 8736 hour(s))   GYN Cytology    Collection Time: 03/04/21 11:01 AM   Result Value Ref Range    Cytology Report       INTERPRETATION    Cervical material, (ThinPrep vial, Imaging-assisted review):  Specimen Adequacy:       Satisfactory for evaluation.       - Endocervical/transformation zone component present. Descriptive Diagnosis:       Negative for intraepithelial lesion or malignancy. Shift in arti suggestive of bacterial vaginosis. Cytotechnologist:   Whitney Alves.  ALEXA Zhu(ASCP)  **Electronically Signed Out**  mary/3/13/2021            Source:  1: Cervical material, (ThinPrep vial, Imaging-assisted review)    Clinical History  Z01.419 Routine gyn exam without abnormal findings  High risk HPV DNA testing is requested if the diagnosis is abnormal    GYNECOLOGIC CYTOLOGY REPORT    Patient Name: Joe Pap: [id number]  Path Number: [id number]  [place] [address] [address] [place], [address]  [phone]  Fax: [phone]     [facility] Health Screen    Collection Time: 07/27/21  7:35 AM   Result Value Ref Range    Glucose 89 70 - 99 mg/dL    Cholesterol 184 <200 mg/dL    HDL 56 >40 mg/dL    LDL Cholesterol 105 0 - 130 mg/dL    Chol/HDL Ratio 3.3 <5    Triglycerides 114 <150 mg/dL    VLDL NOT REPORTED 1 - 30 mg/dL    Patient Fasting? YES    POCT urine pregnancy    Collection Time: 12/09/21  8:00 AM   Result Value Ref Range    Preg Test, Ur POSITIVE     Control present    C.trachomatis N.gonorrhoeae DNA    Collection Time: 12/09/21 11:26 PM    Specimen: Cervix   Result Value Ref Range    Specimen Description . CERVIX     C. trachomatis DNA NEGATIVE NEGATIVE    N. gonorrhoeae DNA NEGATIVE NEGATIVE   VAGINITIS DNA PROBE    Collection Time: 12/09/21 11:26 PM    Specimen: Vaginal   Result Value Ref Range    Specimen Description . VAGINA     Special Requests NOT REPORTED     Direct Exam POSITIVE for Gardnerella vaginalis. (A)     Direct Exam NEGATIVE for Candida sp. Direct Exam NEGATIVE for Trichomonas vaginalis     Direct Exam       Method of testing is a DNA probe intended for detection and identification of Candida species, Gardnerella vaginalis, and Trichomonas vaginalis nucleic acid in vaginal fluid specimens from patients with symptoms of vaginitis/vaginosis.    Cystic Fibrosis    Collection Time: 12/11/21 12:00 AM   Result Value Ref Range    Cystic Fibrosis       Specimen(s) Received:  Peripheral blood, CF  Clinical Information:  Patient History Unknown  Prenatal Screening for Cystic Fibrosis    RESULTS:  ** This patient is negative for all CF mutations analyzed **  Due to the complex nature of this testing, genetic counseling is  recommended  This interpretation is based on the assumption that this individual  has a negative personal and family history for cystic fibrosis    INTERPRETATION:    Using the methods described, this patient tested  negative for all 60 mutations screened, including those recommended by  the [employer] of [employer]stetricians and Gynecologists and the  [employer]. These results do not rule out  the possibility that this individual could carry a CF mutation not  detected by this test.  The patient should understand that DNA studies  do not constitute a definitive carrier test for cystic fibrosis in all  individuals. Thus, interpretation is given as a probability (see  below). Accurate risk c alculation requires accurate family history  information. Inaccurate reporting of a family history of cystic  fibrosis will lead to errors in residual risk assessment. It should  be realized that there are many sources of diagnostic error in  molecular testing which may include trace contamination of PCR  reactions, rare genetic variants that can interfere with the analysis,  or general laboratory error. Cystic Fibrosis Carrier Rate by Racial and Ethnic Group, Before and  After Screening  [from Junior et al (2001):  [place] in Med 3(2), 149-154]                                  Estimated Carrier Risk          Ethnic Group               Detection Rate                     Before Test          After Negative Test       Ashkenazi Adventism                    97%               1/29 1/930                       90%               1/29 1/240                      69%               1/65 1/207       * American                 57%               1/46 1/105   American                    No data available          1/90           No data available     *Additional information required to accurately predict risk  Note:  Residual carrier risk after negative test is modified by  presence of positive family history of CF or by admixture of ethnic  groups.   For these situations, accurate risk assessment requires Bayesian  analysis and genetic counseling    This molecular test has been approved for in vitro diagnostic use by  the U.S. FDA. This test is used for clinical purposes. Pursuant to  the requirements of CLIA '88, this laboratory has established and  verified the test's accuracy and precision. It should not be regarded  as investigational or for research. This laboratory is certified  under the [address] (CLIA  '88) as qualified to perform high complexity clinical laboratory  testing. Methodology:  Samples are tested for the prese nce of wild type or  mutant gene sequences in the CFTR gene by the [employer] Cystic  Fibrosis 60 Kit. It is comprised of a single multiplex PCR reaction  that is then used in two separate Allele Specific Primer Extension  reactions, which are followed by two separate bead hybridization  reactions. The Golden Gekkoe Assay screens for 60 CFTR mutations,  including the standard 23 mutation core panel recommended by the  [employer] of Obstetricians and Gynecologists (ACOG) and the  [address] ([facility]):  , , G542X,  G85E, R117H, 621+1G>T, 711+1G>T, I0491M, R334W, R347P, A455E,  1717-1G>A, R560T, R553X, G551D, 1898+1G>A, 2184delA, 2789+5G>A,  3120+1G>A, Z4344X, 3659delC, 3849+10kbC>T, K2373I, 1078delT, 394delTT,  Y122X, R347H, V520F, A559T, S549N, S549R, 1898+5G>T, 2183AA>G,  2307insA, Q1912R, U2880X, G8064M, 3876delA, 3905insT, CFTRdele2,3,  E60X, R75X, 406-1G>A, G178R, [id number], L206W, 935delA, G330X, Q493X,  Q890X, 1677delTA, 2424zhz0>A, 2143delT, K 710X, R8581F, 1179oyx4,  F5072D, [id number], S3943Z, 3791delC and variants 5T/7T/9T, F508C, I507V,  I506V. For samples positive for R117H, the IVS-8 Poly T variant is  analyzed.             **Electronically Signed Out**   Remy Aguilar M.D.         Beni Chinchilla  [address] [place] [address] Urine NEGATIVE NEGATIVE    Methadone Screen, Urine NEGATIVE NEGATIVE    Opiates, Urine NEGATIVE NEGATIVE    Phencyclidine, Urine NEGATIVE NEGATIVE    Propoxyphene, Urine NOT REPORTED NEGATIVE    Cannabinoid Scrn, Ur NEGATIVE NEGATIVE    Oxycodone Screen, Ur NEGATIVE NEGATIVE    Methamphetamine, Urine NOT REPORTED NEGATIVE    Tricyclic Antidepressants, Urine NOT REPORTED NEGATIVE    MDMA, Urine NOT REPORTED NEGATIVE    Buprenorphine Urine NOT REPORTED NEGATIVE    Test Information       Assay provides medical screening only. The absence of expected drug(s) and/or metabolite(s) may indicate diluted or adulterated urine, limitations of testing or timing of collection. Culture, Urine    Collection Time: 12/11/21  9:13 AM    Specimen: Urine, clean catch   Result Value Ref Range    Specimen Description . CLEAN CATCH URINE     Special Requests NOT REPORTED     Culture NO SIGNIFICANT GROWTH    Urinalysis    Collection Time: 12/11/21  9:13 AM   Result Value Ref Range    Color, UA Yellow Yellow    Turbidity UA Clear Clear    Glucose, Ur NEGATIVE NEGATIVE    Bilirubin Urine NEGATIVE NEGATIVE    Ketones, Urine NEGATIVE NEGATIVE    Specific Gravity, UA 1.010 1.005 - 1.030    Urine Hgb NEGATIVE NEGATIVE    pH, UA 7.5 5.0 - 8.0    Protein, UA NEGATIVE NEGATIVE    Urobilinogen, Urine Normal Normal    Nitrite, Urine NEGATIVE NEGATIVE    Leukocyte Esterase, Urine NEGATIVE NEGATIVE    Urinalysis Comments       Microscopic exam not performed based on chemical results unless requested in original order.    T. pallidum Ab    Collection Time: 12/11/21  9:13 AM   Result Value Ref Range    T. pallidum, IgG NONREACTIVE NONREACTIVE   Rubella antibody, IgG    Collection Time: 12/11/21  9:13 AM   Result Value Ref Range    Rubella Antibody, .9 IU/mL       Past Medical History:   Diagnosis Date    Asthma     as child    Seasonal allergies     Stress incontinence 2021    pregnancy Past Surgical History:   Procedure Laterality Date    WISDOM TOOTH EXTRACTION       Family History   Problem Relation Age of Onset    High Cholesterol Mother     High Cholesterol Father     Hypertension Father 39    No Known Problems Brother     Alzheimer's Disease Maternal Grandmother     Heart Attack Maternal Grandfather 43    No Known Problems Paternal Grandmother     Hypotension Paternal Grandfather      Social History     Tobacco Use   Smoking Status Never Smoker   Smokeless Tobacco Never Used     Social History     Substance and Sexual Activity   Alcohol Use Not Currently       MEDICATIONS:  Current Outpatient Medications   Medication Sig Dispense Refill    clindamycin (CLEOCIN) 2 % vaginal cream Place vaginally nightly x 7 days 1 each 0    Prenatal Vit-Fe Fumarate-FA (PRENATAL PO) Take by mouth       No current facility-administered medications for this visit. ALLERGIES:  Patient has no known allergies. Reviewed global and practice OB care including nausea measures, nutrition, activities, warning signs, and contact information.  Offered cell free DNA screen,NT echo and WIC .    `--------------------------------------------------------------------------  Genetic Screening/Teratology Counseling  (Include patient, FOB or anyone in either family)    1) Patient's age 28 years or > at [place]: No  2) Thalassemia (Mediterranean, ): No  3) Neural Tube Defect:   No  4) Congenital heart defect:   No  5) Trisomy (e.g. Down Syndrome):  No  6) El-sachs (Amish, [address]): No  7) Multiple Births:    No  8) Sickle cell (disease or trait):  No  9) Hemophilia or blood disorders:  No  10) Muscular Dystrophy:   No  11) Cystic Fibrosis:    No  12) Sakina's chorea:   No  13) Mental retardation/Autism :  No   If yes, was person tested for fragile X: No  14) Other inherited genetic/chromosomal disorder: No  15) Maternal metabolic disorder (DM, PKU): No  16) Child with birth defect not listed:  No  17) Recurrent pregnancy loss/stillbirth: No  18) Medications, supplements/illicit or   Recreational drugs/alcohol since LMP: No   List: none  19) Any other:   none    Comments/Counseling: declining all genetic testing.   -pt aware referral is being placed for anatomy scan at [facility] at 20 wks.   -------------------------------------------------------------------------  Infection History:    1) Live with someone with TB/exposed to TB: No  2) Patient/partner has h/o genital herpes: No  3) Rash/viral illness since LMP:  Yes after thanksgiving, fever and coughed day. Lost voice week.    4) History of STD:    No  5) Other: NA  -------------------------------------------------------------------------

## 2021-12-16 NOTE — PATIENT INSTRUCTIONS
Patient Education        Weeks 10 to 14 of Your Pregnancy: Care Instructions  Overview     By weeks 10 to 15 of your pregnancy, the placenta has formed inside your uterus. The placenta's main job is to give your baby oxygen and nutrients through the umbilical cord. It's possible to hear your baby's heartbeat with a special ultrasound device. Your baby's organs are developing. The arms and legs can bend. This is a good time to think about testing for birth defects. There are two types of tests: screening and diagnostic. Screening tests show the chance that a baby has a certain birth defect. They can't tell you for sure that your baby has a problem. Diagnostic tests show if a baby has a certain birth defect. It's your choice whether to have these tests. You and your partner can talk to your doctor or midwife about tests for birth defects. Follow-up care is a key part of your treatment and safety. Be sure to make and go to all appointments, and call your doctor if you are having problems. It's also a good idea to know your test results and keep a list of the medicines you take. How can you care for yourself at home? Decide about tests  · You can have screening tests and diagnostic tests to check for birth defects. The decision to have a test for birth defects is personal. Think about your age, your chance of passing on a family disease, your need to know about any problems, and what you might do after you have the test results. ? Quadruple (quad) blood test. This screening test can be done between 15 and 22 weeks of pregnancy. It checks the amount of four substances in your blood. The doctor looks at these test results, along with your age and other factors, to find out the chance that your baby may have certain problems. ? Amniocentesis. This diagnostic test is used to look for chromosomal problems in the baby's cells.  It can be done between 15 and 20 weeks of pregnancy, usually around week 16.  ? Nuchal translucency test. This test uses ultrasound to measure the thickness of the area at the back of the baby's neck. An increase in the thickness can be an early sign of Down syndrome. ? Chorionic villus sampling (CVS). This is a test that looks for certain genetic problems with your baby. The same genes that are in your baby are in the placenta. A small piece of the placenta is taken out and tested. This test is done when you are 10 to 13 weeks pregnant. Ease discomfort  · Slow down and take naps when you feel tired. · If your emotions swing, talk to someone. · If your gums bleed, try a softer toothbrush. If your gums are puffy and bleed a lot, see your dentist.  · If you feel dizzy:  ? Get up slowly after sitting or lying down. ? Drink plenty of fluids. ? Eat small snacks to keep your blood sugar stable. ? Put your head between your legs as though you were tying your shoelaces. ? Lie down with your legs higher than your head. Use pillows to prop up your feet. · If you have a headache:  ? Lie down. ? Ask your partner or a good friend for a neck massage. ? Try cool cloths over your forehead or across the back of your neck. ? Use acetaminophen (Tylenol) for pain relief. Do not use nonsteroidal anti-inflammatory drugs (NSAIDs), such as ibuprofen (Advil, Motrin) or naproxen (Aleve), unless your doctor says it is okay. · If you have a nosebleed, pinch your nose gently, and hold it for a short while. To prevent nosebleeds, try massaging a small dab of petroleum jelly, such as Vaseline, in your nostrils. · If your nose is stuffed up, try saline (saltwater) nose sprays. Do not use decongestant sprays. Care for your breasts  · Wear a bra that gives you good support. · Know that changes in your breasts are normal.  ? Your breasts may get larger and more tender. Tenderness usually gets better by 12 weeks. ? Your nipples may get darker and larger, and small bumps around your nipples may show more. ?  The veins in your chest and breasts may show more. Where can you learn more? Go to https://chpepiceweb.[employer] org and sign in to your Adyen account. Enter W477 in the [facility] box to learn more about \"Weeks 10 to 14 of Your Pregnancy: Care Instructions. \"     If you do not have an account, please click on the \"Sign Up Now\" link. Current as of: June 16, 2021               Content Version: 13.0  © 3465-6996 [employer]. Care instructions adapted under license by [facility] [facility] ([facility]). If you have questions about a medical condition or this instruction, always ask your healthcare professional. [place] [address] any warranty or liability for your use of this information. Patient Education        Learning About Birth Defects Testing  What is birth defects testing? Birth defects testing is done during pregnancy to look for possible problems with the baby (fetus). A birth defect may have only a minor impact on a child's life. Or it can have a major effect on quality or length of life. You and your doctor can choose from many tests. You may have no tests, one test, or many tests. What are the types of tests? You may have a screening test, a diagnostic test, or both. Screening tests show the chance that a baby has a certain birth defect, such as Down syndrome, spina bifida, or trisomy 25. Diagnostic tests show if a baby has a certain birth defect. · Screening tests and when they're done:  ? Blood tests at 10 to 13 weeks (first trimester)  ? Cell-free fetal DNA test at 10 weeks or later (first trimester)  ? Nuchal translucency test at 10 to 13 weeks (first trimester)  ? Quad screening at 15 to 22 weeks (second trimester)  ? Ultrasound at 18 to 22 weeks (second trimester)  · Diagnostic tests and when they're done:  ? Chorionic villus sampling (CVS) at 10 to 13 weeks (first trimester)  ?  Amniocentesis at 13 to 20 weeks (second trimester)  Sometimes doctors will look at the combined screenings that you've had over a period of time. This is called an integrated screening. What are the screening tests? · Blood tests are done to look at different substances in your blood. These tests include cell free fetal DNA and quadruple (quad) blood tests. Both of these tests can help find genetic problems. · Nuchal translucency test uses ultrasound to measure the thickness of the area at the back of the baby's neck. An increase in thickness can be an early sign of certain birth defects. Ultrasound is a tool that uses sound waves to make pictures of your baby and placenta inside the uterus. · Ultrasound lets your doctor see an image of your baby. It can help your doctor look for problems of the heart, spine, belly, or other areas. What are the diagnostic tests? Chorionic villus sampling (CVS) looks at cells from the placenta. To do the test, your doctor may put a thin tube through your vagina and cervix to take out a small piece of the placenta. Or the doctor may take out the piece through a needle in your belly. This test can diagnose many genetic diseases. But it can't find problems with the spinal cord. Amniocentesis looks at the amniotic fluid that surrounds your baby. Your doctor will put a needle through your belly into your uterus and take out a very small amount of fluid to test.  What are the risks of these tests? There is a small risk of a miscarriage after a CVS or amniocentesis. Your doctor or genetic counselor can help you understand this risk. These tests are generally very safe. Where can you learn more? Go to https://Pressflipkathy.TBS. org and sign in to your B[employer]ow account. Enter G030 in the [employer] box to learn more about \"Learning About Birth Defects Testing. \"     If you do not have an account, please click on the \"Sign Up Now\" link. Current as of: June 16, 2021               Content Version: 13.0  © 8793-5074 [employer].    Care instructions adapted under license by [facility] ([facility]). If you have questions about a medical condition or this instruction, always ask your healthcare professional. [address] any warranty or liability for your use of this information.

## 2022-01-20 NOTE — PATIENT INSTRUCTIONS
Patient Education        Weeks 10 to 14 of Your Pregnancy: Care Instructions  Overview     By weeks 10 to 15 of your pregnancy, the placenta has formed inside your uterus. The placenta's main job is to give your baby oxygen and nutrients through the umbilical cord. It's possible to hear your baby's heartbeat with a special ultrasound device. Your baby's organs are developing. The arms and legs can bend. This is a good time to think about testing for birth defects. There are two types of tests: screening and diagnostic. Screening tests show the chance that a baby has a certain birth defect. They can't tell you for sure that your baby has a problem. Diagnostic tests show if a baby has a certain birth defect. It's your choice whether to have these tests. You and your partner can talk to your doctor or midwife about tests for birth defects. Follow-up care is a key part of your treatment and safety. Be sure to make and go to all appointments, and call your doctor if you are having problems. It's also a good idea to know your test results and keep a list of the medicines you take. How can you care for yourself at home? Decide about tests  · You can have screening tests and diagnostic tests to check for birth defects. The decision to have a test for birth defects is personal. Think about your age, your chance of passing on a family disease, your need to know about any problems, and what you might do after you have the test results. ? Quadruple (quad) blood test. This screening test can be done between 15 and 22 weeks of pregnancy. It checks the amount of four substances in your blood. The doctor looks at these test results, along with your age and other factors, to find out the chance that your baby may have certain problems. ? Amniocentesis. This diagnostic test is used to look for chromosomal problems in the baby's cells.  It can be done between 15 and 20 weeks of pregnancy, usually around week 16.  ? Nuchal translucency test. This test uses ultrasound to measure the thickness of the area at the back of the baby's neck. An increase in the thickness can be an early sign of Down syndrome. ? Chorionic villus sampling (CVS). This is a test that looks for certain genetic problems with your baby. The same genes that are in your baby are in the placenta. A small piece of the placenta is taken out and tested. This test is done when you are 10 to 13 weeks pregnant. Ease discomfort  · Slow down and take naps when you feel tired. · If your emotions swing, talk to someone. · If your gums bleed, try a softer toothbrush. If your gums are puffy and bleed a lot, see your dentist.  · If you feel dizzy:  ? Get up slowly after sitting or lying down. ? Drink plenty of fluids. ? Eat small snacks to keep your blood sugar stable. ? Put your head between your legs as though you were tying your shoelaces. ? Lie down with your legs higher than your head. Use pillows to prop up your feet. · If you have a headache:  ? Lie down. ? Ask your partner or a good friend for a neck massage. ? Try cool cloths over your forehead or across the back of your neck. ? Use acetaminophen (Tylenol) for pain relief. Do not use nonsteroidal anti-inflammatory drugs (NSAIDs), such as ibuprofen (Advil, Motrin) or naproxen (Aleve), unless your doctor says it is okay. · If you have a nosebleed, pinch your nose gently, and hold it for a short while. To prevent nosebleeds, try massaging a small dab of petroleum jelly, such as Vaseline, in your nostrils. · If your nose is stuffed up, try saline (saltwater) nose sprays. Do not use decongestant sprays. Care for your breasts  · Wear a bra that gives you good support. · Know that changes in your breasts are normal.  ? Your breasts may get larger and more tender. Tenderness usually gets better by 12 weeks. ? Your nipples may get darker and larger, and small bumps around your nipples may show more. ?  The veins in your chest and breasts may show more. Where can you learn more? Go to https://chpepiceweb.healthHarmony Information [email or web address] org and sign in to your eTapestry account. Enter V322 in the Kite box to learn more about \"Weeks 10 to 14 of Your Pregnancy: Care Instructions. \"     If you do not have an account, please click on the \"Sign Up Now\" link. Current as of: June 16, 2021               Content Version: 13.1  © 2299-0826 Healthwise, Incorporated. Care instructions adapted under license by [facility] ([facility]). If you have questions about a medical condition or this instruction, always ask your healthcare professional. [address] any warranty or liability for your use of this information.

## 2022-01-21 ENCOUNTER — ROUTINE PRENATAL (OUTPATIENT)
Dept: OBGYN CLINIC | Age: 32
End: 2022-01-21

## 2022-01-21 VITALS — DIASTOLIC BLOOD PRESSURE: 84 MMHG | WEIGHT: 179 LBS | BODY MASS INDEX: 31.21 KG/M2 | SYSTOLIC BLOOD PRESSURE: 122 MMHG

## 2022-01-21 DIAGNOSIS — Z3A.13 13 WEEKS GESTATION OF PREGNANCY: Primary | ICD-10-CM

## 2022-01-21 DIAGNOSIS — Z34.91 NORMAL PREGNANCY IN FIRST TRIMESTER: ICD-10-CM

## 2022-01-21 PROCEDURE — 0502F SUBSEQUENT PRENATAL CARE: CPT | Performed by: NURSE PRACTITIONER

## 2022-01-21 NOTE — PROGRESS NOTES
-FM, -Ctx, -LOF, -VB  There is no problem list on file for this patient. Blood pressure 122/84, weight 179 lb (81.2 kg), last menstrual period 10/09/2021, not currently breastfeeding.   Feeling well  Only has nausea when in the grocery store  [facility] first trimester screening  Reminded to have early 1 hour drawn

## 2022-01-29 ENCOUNTER — HOSPITAL ENCOUNTER (OUTPATIENT)
Age: 32
Discharge: HOME | End: 2022-01-29
Payer: COMMERCIAL

## 2022-01-29 DIAGNOSIS — Z34.91 NORMAL PREGNANCY IN FIRST TRIMESTER: ICD-10-CM

## 2022-01-29 DIAGNOSIS — Z3A.01 7 WEEKS GESTATION OF PREGNANCY: ICD-10-CM

## 2022-01-29 LAB
GLUCOSE ADMINISTRATION: NORMAL
GLUCOSE TOLERANCE SCREEN 50G: 120 MG/DL (ref 70–135)

## 2022-01-29 PROCEDURE — 36415 COLL VENOUS BLD VENIPUNCTURE: CPT

## 2022-01-29 PROCEDURE — 82950 GLUCOSE TEST: CPT

## 2022-02-15 NOTE — PATIENT INSTRUCTIONS
Patient Education        Weeks 14 to 18 of Your Pregnancy: Care Instructions  Overview     During this time, you may start to \"show,\" so that you look pregnant to people around you. You may also notice some changes in your skin, such as itchy spots on your palms or acne on your face. Your baby is now able to pass urine. And your baby's first stool (meconium) is starting to collect in your baby's intestines. Hair is also starting to grow on your baby's head. At your next visit, between weeks 18 and 20, your doctor may do an ultrasound test. The test allows your doctor to check for certain problems. Your doctor can also tell the sex of your baby. So this a good time to think about whether you want to know. Talk to your doctor about getting a flu shot to help keep you healthy during your pregnancy. As your pregnancy moves along, it's common to worry or feel anxious. Your body is changing a lot. And you are thinking about giving birth, the health of your baby, and becoming a parent. You can talk to your doctor about any anxiety and stress you feel. Follow-up care is a key part of your treatment and safety. Be sure to make and go to all appointments, and call your doctor if you are having problems. It's also a good idea to know your test results and keep a list of the medicines you take. How can you care for yourself at home? Reduce stress    · Ask for help with cooking and housekeeping.     · Figure out who or what causes your stress. Avoid these people or situations as much as possible.     · Relax every day. Taking 10- to 15-minute breaks can make a big difference. Take a walk, listen to music, or take a warm bath.     · Learn relaxation techniques at prenatal or yoga class. Or buy a relaxation tape.     · List your fears about having a baby and becoming a parent. Share the list with someone you trust. Decide which worries are really small, and try to let them go.    Exercise    · If you did not exercise much or this instruction, always ask your healthcare professional. [address] any warranty or liability for your use of this information. Patient Education        Learning About Foods That Are Good Sources of Fiber  What foods are high in fiber? The foods you eat contain nutrients, such as vitamins and minerals. Fiber is a nutrient. Your body needs the right amount to stay healthy and work as it should. You can use the list below to help you make choices about which foods to eat. Here are some examples of foods that are good sources of fiber. Fruits  · Apple  · Apricot  · Avocado  · Banana  · Blackberries  · Cherries  · Melon  · Pear  · Raspberries  Grains  · Amaranth  · Barley  · Bran cereal  · Farro  · Oat bran  · Oatmeal  · Quinoa  · Rice (brown or wild)  · Whole-grain bread  · Whole-grain English muffin  Protein foods  · Almonds  · Beans (black, kidney, navy, gilliam)  · Iam seeds  · Garbanzo beans  · Lentils  · Pumpkin seeds  · Split peas  · Sunflower seeds  Vegetables  · Artichoke  · Broccoli  · [place] sprouts  · Cabbage  · Carrots  · Cauliflower  · Eggplant  · Green peas  · Kale  · Pumpkin  · Sweet potato  · White potato  Work with your doctor to find out how much of this nutrient you need. Depending on your health, you may need more or less of it in your diet. Where can you learn more? Go to https://Formspringpepiceweb.healthAgile [email or web address] org and sign in to your Cool Containers account. Enter F355 in the [facility] box to learn more about \"Learning About Foods That Are Good Sources of Fiber. \"     If you do not have an account, please click on the \"Sign Up Now\" link. Current as of: September 8, 2021               Content Version: 13.1  © 4460-8720 Healthwise, dbTwang. Care instructions adapted under license by [facility] ([facility]).  If you have questions about a medical condition or this instruction, always ask your healthcare professional. [address] any warranty or liability for your use of this information. Patient Education        High-Fiber Diet: Care Instructions  Overview     A high-fiber diet may help you relieve constipation and feel less bloated. Your doctor and dietitian will help you make a high-fiber eating plan based on your personal needs. The plan will include the things you like to eat. It will also make sure that you get 25 to 35 grams of fiber a day. Before you make changes to the way you eat, be sure to talk with your doctor or dietitian. Follow-up care is a key part of your treatment and safety. Be sure to make and go to all appointments, and call your doctor if you are having problems. It's also a good idea to know your test results and keep a list of the medicines you take. How can you care for yourself at home? · You can increase how much fiber you get if you eat more of certain foods. These foods include:  ? Whole-grain breads and cereals. ? Fruits, such as pears, apples, and peaches. Eat the skins and peels if you can.  ? Vegetables, such as broccoli, cabbage, spinach, carrots, asparagus, and squash. ? Starchy vegetables. These include potatoes with skins, kidney beans, and lima beans. · Take a fiber supplement every day if your doctor recommends it. Examples are Benefiber, Citrucel, FiberCon, and Metamucil. Ask your doctor how much to take. · Drink plenty of fluids. If you have kidney, heart, or liver disease and have to limit fluids, talk with your doctor before you increase the amount of fluids you drink. Where can you learn more? Go to https://SkycatchpeCambridge Companies.charming [email or web address] org and sign in to your AmigoCAT account. Enter I022 in the [facility] box to learn more about \"High-Fiber Diet: Care Instructions. \"     If you do not have an account, please click on the \"Sign Up Now\" link. Current as of: September 8, 2021               Content Version: 13.1  © 3884-1073 Healthwise, [place].    Care instructions adapted under license by [facility] ([facility]). If you have questions about a medical condition or this instruction, always ask your healthcare professional. [address] any warranty or liability for your use of this information.

## 2022-02-16 ENCOUNTER — ROUTINE PRENATAL (OUTPATIENT)
Dept: OBGYN CLINIC | Age: 32
End: 2022-02-16

## 2022-02-16 VITALS — BODY MASS INDEX: 31.73 KG/M2 | WEIGHT: 182 LBS | SYSTOLIC BLOOD PRESSURE: 116 MMHG | DIASTOLIC BLOOD PRESSURE: 72 MMHG

## 2022-02-16 DIAGNOSIS — Z3A.16 16 WEEKS GESTATION OF PREGNANCY: Primary | ICD-10-CM

## 2022-02-16 DIAGNOSIS — Z34.02 SUPERVISION OF NORMAL FIRST PREGNANCY IN SECOND TRIMESTER: ICD-10-CM

## 2022-02-16 PROCEDURE — 0502F SUBSEQUENT PRENATAL CARE: CPT | Performed by: NURSE PRACTITIONER

## 2022-02-16 NOTE — PROGRESS NOTES
-FM yet, -Ctx, -LOF, -VB  There is no problem list on file for this patient. Blood pressure 116/72, weight 182 lb (82.6 kg), last menstrual period 10/09/2021, not currently breastfeeding.    Feeling much better  Some fatigue  Declines AFP  Anatomy scheduled

## 2022-03-14 ENCOUNTER — ROUTINE PRENATAL (OUTPATIENT)
Dept: PERINATAL CARE | Age: 32
End: 2022-03-14
Payer: COMMERCIAL

## 2022-03-14 VITALS
WEIGHT: 186 LBS | RESPIRATION RATE: 16 BRPM | HEIGHT: 64 IN | SYSTOLIC BLOOD PRESSURE: 116 MMHG | DIASTOLIC BLOOD PRESSURE: 77 MMHG | HEART RATE: 104 BPM | TEMPERATURE: 97.7 F | BODY MASS INDEX: 31.76 KG/M2

## 2022-03-14 DIAGNOSIS — Z3A.20 20 WEEKS GESTATION OF PREGNANCY: ICD-10-CM

## 2022-03-14 DIAGNOSIS — Z36.86 ENCOUNTER FOR SCREENING FOR RISK OF PRE-TERM LABOR: ICD-10-CM

## 2022-03-14 DIAGNOSIS — O99.212 OBESITY AFFECTING PREGNANCY IN SECOND TRIMESTER: Primary | ICD-10-CM

## 2022-03-14 PROBLEM — O32.2XX0 TRANSVERSE FETAL LIE: Status: ACTIVE | Noted: 2022-03-14

## 2022-03-14 LAB
ABDOMINAL CIRCUMFERENCE: NORMAL
ABDOMINAL CIRCUMFERENCE: NORMAL
BIPARIETAL DIAMETER: NORMAL
BIPARIETAL DIAMETER: NORMAL
ESTIMATED FETAL WEIGHT: NORMAL
ESTIMATED FETAL WEIGHT: NORMAL
FEMORAL DIAMETER: NORMAL
FEMORAL DIAMETER: NORMAL
HC/AC: NORMAL
HC/AC: NORMAL
HEAD CIRCUMFERENCE: NORMAL
HEAD CIRCUMFERENCE: NORMAL

## 2022-03-14 PROCEDURE — 76811 OB US DETAILED SNGL FETUS: CPT | Performed by: OBSTETRICS & GYNECOLOGY

## 2022-03-14 PROCEDURE — 99999 PR OFFICE/OUTPT VISIT,PROCEDURE ONLY: CPT | Performed by: OBSTETRICS & GYNECOLOGY

## 2022-03-14 PROCEDURE — 76817 TRANSVAGINAL US OBSTETRIC: CPT | Performed by: OBSTETRICS & GYNECOLOGY

## 2022-03-16 NOTE — PATIENT INSTRUCTIONS
Patient Education        Weeks 18 to 22 of Your Pregnancy: Care Instructions  Overview     Your baby is continuing to develop quickly. Sometime between 18 and 22 weeks, you'll probably start to feel your baby move. At first, these small fetal movements feel like fluttering or \"butterflies. \" Or they may feel like gas bubbles. As your baby grows, these movements will become stronger. You may also notice that your baby hiccups. Babies at this stage can now suck their thumbs. You may find that your nausea and fatigue are gone. You may feel better overall and have more energy than you did in your first trimester. But you might now also have some new discomforts, like sleep problems or leg cramps. Talk to your doctor about things you can do at home to ease these problems. Follow-up care is a key part of your treatment and safety. Be sure to make and go to all appointments, and call your doctor if you are having problems. It's also a good idea to know your test results and keep a list of the medicines you take. How can you care for yourself at home? Ease sleep problems  · Avoid caffeine in drinks or chocolate late in the day. · Get some exercise every day. · Take a warm shower or bath before bed. · Have a light snack or glass of milk at bedtime. · Do relaxation exercises in bed to calm your mind and body. · Support your legs and back with extra pillows. Try a pillow between your legs if you sleep on your side. · Do not use sleeping pills or alcohol. They could harm your baby. Ease leg cramps  · Do not massage your calf during the cramp. · Sit on a firm bed or chair. Straighten your leg, and bend your foot (flex your ankle) slowly upward, toward your knee. Bend your toes up and down. · Stand on a cool, flat surface. Stretch your toes upward, and take small steps walking on your heels. · Use a heating pad or hot water bottle to help with muscle ache. Prevent leg cramps  · Be sure to get enough calcium.  If you are worried that you are not getting enough, talk to your doctor. · Exercise every day, and stretch your legs before bed. · Take a warm bath before bed, and try leg warmers at night. Where can you learn more? Go to https://foreign.healthTrustedAd. org and sign in to your RivalSoft account. Enter G690 in the [facility] box to learn more about \"Weeks 18 to 22 of Your Pregnancy: Care Instructions. \"     If you do not have an account, please click on the \"Sign Up Now\" link. Current as of: June 16, 2021               Content Version: 13.1  © 2006-2021 ProFounder. Care instructions adapted under license by [facility] ([facility]). If you have questions about a medical condition or this instruction, always ask your healthcare professional. [place] [address] any warranty or liability for your use of this information. Patient Education        Learning About When to Call Your Doctor During Pregnancy (After 20 Weeks)  Overview  It's common to have concerns about what might be a problem when you're pregnant. Most pregnancies don't have any serious problems. But it's still important to know when to call your doctor if you have certain symptoms or signs of labor. These are general suggestions. Your doctor may give you some more information about when to call. When to call your doctor (after 20 weeks)  Call 911  anytime you think you may need emergency care. For example, call if:  · You have severe vaginal bleeding. · You have sudden, severe pain in your belly. · You passed out (lost consciousness). · You have a seizure. · You see or feel the umbilical cord. · You think you are about to deliver your baby and can't make it safely to the hospital.  Call your doctor now or seek immediate medical care if:  · You have vaginal bleeding. · You have belly pain. · You have a fever. · You have symptoms of preeclampsia, such as:  ? Sudden swelling of your face, hands, or feet.   ? New vision problems (such as dimness, blurring, or seeing spots). ? A severe headache. · You have a sudden release of fluid from your vagina. (You think your water broke.)  · You think that you may be in labor. This means that you've had at least 6 contractions in an hour. · You notice that your baby has stopped moving or is moving much less than normal.  · You have symptoms of a urinary tract infection. These may include:  ? Pain or burning when you urinate. ? A frequent need to urinate without being able to pass much urine. ? Pain in the flank, which is just below the rib cage and above the waist on either side of the back. ? Blood in your urine. Watch closely for changes in your health, and be sure to contact your doctor if:  · You have vaginal discharge that smells bad. · You have skin changes, such as:  ? A rash. ? Itching. ? Yellow color to your skin. · You have other concerns about your pregnancy. If you have labor signs at 37 weeks or more  If you have signs of labor at 37 weeks or more, your doctor may tell you to call when your labor becomes more active. Symptoms of active labor include:  · Contractions that are regular. · Contractions that are less than 5 minutes apart. · Contractions that are hard to talk through. Follow-up care is a key part of your treatment and safety. Be sure to make and go to all appointments, and call your doctor if you are having problems. It's also a good idea to know your test results and keep a list of the medicines you take. Where can you learn more? Go to https://foreign.Human Factor Analytics. org and sign in to your Smart Ecosystems account. Enter  in the HealthPocket box to learn more about \"Learning About When to Call Your Doctor During Pregnancy (After 20 Weeks). \"     If you do not have an account, please click on the \"Sign Up Now\" link. Current as of: June 16, 2021               Content Version: 13.1  © 3547-7618 Healthwise, [facility].    Care instructions adapted under license by [facility] ([facility]). If you have questions about a medical condition or this instruction, always ask your healthcare professional. [address] any warranty or liability for your use of this information.

## 2022-03-17 ENCOUNTER — ROUTINE PRENATAL (OUTPATIENT)
Dept: OBGYN CLINIC | Age: 32
End: 2022-03-17

## 2022-03-17 VITALS — SYSTOLIC BLOOD PRESSURE: 117 MMHG | BODY MASS INDEX: 32.85 KG/M2 | WEIGHT: 188.4 LBS | DIASTOLIC BLOOD PRESSURE: 74 MMHG

## 2022-03-17 DIAGNOSIS — Z34.02 SUPERVISION OF NORMAL FIRST PREGNANCY IN SECOND TRIMESTER: ICD-10-CM

## 2022-03-17 DIAGNOSIS — Z3A.20 20 WEEKS GESTATION OF PREGNANCY: Primary | ICD-10-CM

## 2022-03-17 PROCEDURE — 0502F SUBSEQUENT PRENATAL CARE: CPT | Performed by: NURSE PRACTITIONER

## 2022-03-28 ENCOUNTER — CARE COORDINATION (OUTPATIENT)
Dept: OTHER | Facility: CLINIC | Age: 32
End: 2022-03-28

## 2022-03-28 NOTE — CARE COORDINATION
ACM attempted to reach patient for introduction to Associate Care Management related to Maternity CM. HIPAA compliant message left requesting a return phone call. Will attempt to outreach patient again. MIMI Polk, RN  Associate Care Manager   Cell: [phone]  [email or web address]

## 2022-04-01 ENCOUNTER — CARE COORDINATION (OUTPATIENT)
Dept: OTHER | Facility: CLINIC | Age: 32
End: 2022-04-01

## 2022-04-01 NOTE — CARE COORDINATION
Patient eligible for [facility] Manager contacted the patient by telephone to discuss the maternity management program.  Patient agrees to care management services at this time. Verified name and  with patient as identifiers. Call within 2 business days of discharge: No     Last Discharge [facility]       Complaint Diagnosis Description Type Department Provider    18 Laceration Laceration of labia majora, initial encounter ED (DISCHARGE) [facility] ED Nikko Pacheco MD          Risk Factors Identified: None     Needs to be reviewed by the provider   none         Method of communication with provider : none    Advance Care Planning:   Does patient have an Advance Directive:  reviewed and current. Does patient have OB/Gyn Selected? Yes    Discussed follow up appointments.  If no appointment was previously scheduled, appointment scheduling offered: Yes  [facility] follow up appointment(s):   Future Appointments   Date Time Provider Manfred Baez   4/15/2022  7:15 AM Shantel Aurelio, APRN - CNP [facility]   2022  7:15 AM Shantel Aurelio, APRN - CNP [facility]   2022  9:15 AM Kendra Wing DO [facility]   6/15/2022  7:00 AM [facility], APRN - CNP [facility]   2022  7:30 AM Shantel Aurelio, APRN - CNP [facility]   2022  9:00 AM Kendra Wing DO [facility]   2022  7:00 AM [facility], APRN - CNP [facility]   2022  7:00 AM Shantel Aurelio, APRN - CNP [facility]   8/3/2022  9:30 AM Kendra Wing DO [facility]     Non-[facility] follow up appointment(s): n/a    OB History:   OB History    Para Term  AB Living   1 0 0 0 0 0   SAB IAB Ectopic Molar Multiple Live Births   0 0 0 0 0 0      # Outcome Date GA Lbr José Miguel/2nd Weight Sex Delivery Anes PTL Lv   1 Current                23w0d    Medication reconciliation was discharge (e.g., lying down, tilted to the side with a pillow to the back, placing fingertips on the fundus for approximately 1 hour to note hardening or tightening of the uterus). · Identify signs and/or symptoms that should be reported immediately to the healthcare provider (e.g, sustained uterine contractions, clear drainage from vagina, bleeding). · Provide information about follow-up care when client is discharged. · Advise client to empty bladder every two (2) hours while awake  · Review daily fluid need; avoid coffee. · Encourage regular rest periods 2-3 times a day in side-lying position. If bedrest is to be continued after discharge, suggest client spend part of day on couch or recliner. Assist patient to identify any risky behaviors and support change;     Red flags maternity  Call 911 anytime you think you may need emergency care. For example, call if:  Call your doctor now or seek immediate medical care if:     You passed out (lost consciousness). You have a seizure. You have severe vaginal bleeding. You have severe pain in your belly or pelvis. You have had fluid gushing or leaking from your vagina and you know or think the  umbilical cord is bulging into your vagina. If this happens, immediately get down on  your knees so your rear end (buttocks) is higher than your head. This will decrease  the pressure on the cord until help arrives. You have signs of preeclampsia, such as:  Sudden swelling of your face, hands, or feet. New vision problems (such as dimness, blurring, or seeing spots). A severe headache. You have any vaginal bleeding. You have belly pain or cramping. You have a fever. You have had regular contractions (with or without pain) for an hour. This means that  you have 8 or more within 1 hour or 4 or more in 20 minutes after you change your  position and drink fluids. You have a sudden release of fluid from your vagina.   You have low back pain or pelvic pressure that does not go away. You notice that your baby has stopped moving or is moving much less than normal.          Summary Note: Patient reports that she is doing great. She is pregnant with her first child and due 65/29/23. She does not know the gender of her baby and does not intend to find out. Patient denies any complications or concerns in her pregnancy. She denies any red-flag s/s or needs r/t DME, medications or appointments. She is participating in the [facility] program and is enjoying this program. She does agree to [address] and a follow-up call with [facility] in 4 weeks. She will call with any needs in the meantime. MIMI Ring, RN  Associate Care Manager   Cell: [phone]  [email or web address]

## 2022-04-06 ENCOUNTER — CARE COORDINATION (OUTPATIENT)
Dept: OTHER | Facility: CLINIC | Age: 32
End: 2022-04-06

## 2022-04-06 NOTE — CARE COORDINATION
ACM attempted to reach patient for Maternity CM follow-up call. HIPAA compliant message left requesting a return phone call. Will attempt to outreach patient again. MIMI Emerson, RN  Associate Care Manager   Cell: [phone]  [email or web address]

## 2022-04-14 NOTE — PATIENT INSTRUCTIONS
Patient Education        Weeks 26 to 30 of Your Pregnancy: Care Instructions  Overview     You are now entering your last trimester of pregnancy. Your baby is growing quickly. Shirlene Jackson probably feel your baby moving around more often. Your doctormay ask you to count your baby's kicks. Your back may ache as your body gets used to your baby's size and length. If you haven't already had the Tdap shot during this pregnancy, talk to your doctor about getting it. It will help protect your  against pertussisinfection. During this time, it's important to take care of yourself and pay attention to what your body needs. If you feel sexual, you can explore ways to be close withyour partner that match your comfort and desire. Follow-up care is a key part of your treatment and safety. Be sure to make and go to all appointments, and call your doctor if you are having problems. It's also a good idea to know your test results and keep alist of the medicines you take. How can you care for yourself at home? Take it easy at work   Take frequent breaks. If possible, stop working when you are tired, and rest during your lunch hour.  Take bathroom breaks every 2 hours.  Change positions often. If you sit for long periods, stand up and walk around.  When you stand for a long time, keep one foot on a low stool with your knee bent. After standing a lot, sit with your feet up.  Avoid fumes, chemicals, and tobacco smoke. Be sexual in your own way   Having sex during pregnancy is okay, unless your doctor tells you not to.  You may be very interested in sex, or you may have no interest at all.  Your growing belly can make it hard to find a good position during intercourse. [place] and explore.  You may get cramps in your uterus when your partner touches your breasts.  A back rub may relieve the backache or cramps that sometimes follow orgasm. Learn about  labor   Watch for signs of  labor.  You may be going into labor if:  ? You have menstrual-like cramps, with or without nausea. ? You have about 6 or more contractions in 1 hour, even after you have had a glass of water and are resting. ? You have a low, dull backache that does not go away when you change your position. ? You have pain or pressure in your pelvis that comes and goes in a pattern. ? You have intestinal cramping or flu-like symptoms, with or without diarrhea.  ? You notice an increase or change in your vaginal discharge. Discharge may be heavy, mucus-like, watery, or streaked with blood. ? Your water breaks.  If you think you have  labor:  ? Drink 2 or 3 glasses of water or juice. Not drinking enough fluids can cause contractions. ? Stop what you are doing, and empty your bladder. Then lie down on your left side for at least 1 hour. ? While lying on your side, find your breast bone. Put your fingers in the soft spot just below it. Move your fingers down toward your belly button to find the top of your uterus. Check to see if it is tight. ? Contractions can be weak or strong. Record your contractions for an hour. Time a contraction from the start of one contraction to the start of the next one.  ? Single or several strong contractions without a pattern are called Darrius-Solitario contractions. They are practice contractions but not the start of labor. They often stop if you change what you are doing. ? Call your doctor if you have regular contractions. Where can you learn more? Go to [email or web address] [email or web address] org and sign in to your [employer] account. Enter G770 in the [employer] box to learn more about \"Weeks 26 to 30 of Your Pregnancy: Care Instructions. \"     If you do not have an account, please click on the \"Sign Up Now\" link. Current as of: 2021               Content Version: 13.2  © 7346-5962 Healthwise, Incorporated. Care instructions adapted under license by [address]  If you have questions about a medical condition or this instruction, always ask your healthcare professional. [address] any warranty or liability for your use of this information. Patient Education        Counting Your Baby's Kicks: Care Instructions  Overview     Counting your baby's kicks is one way your doctor can tell that your baby is healthy. Most women--especially in a first pregnancy--feel their baby move for the first time between 16 and 22 weeks. The movement may feel like flutters rather than kicks. Your baby may move more at certain times of the day. When you are active, you may notice less kicking than when you are resting. At yourprenatal visits, your doctor will ask whether the baby is active. In your last trimester, your doctor may ask you to count the number of timesyou feel your baby move. Follow-up care is a key part of your treatment and safety. Be sure to make and go to all appointments, and call your doctor if you are having problems. It's also a good idea to know your test results and keep alist of the medicines you take. How do you count fetal kicks?  A common method of checking your baby's movement is to note the length of time it takes to count ten movements (such as kicks, flutters, or rolls).  Pick your baby's most active time of day to count. This may be any time from morning to evening.  If you don't feel 10 movements in an hour, have something to eat or drink and count for another hour. If you don't feel at least 10 movements in the 2-hour period, call your doctor. When should you call for help? Call your doctor now or seek immediate medical care if:     You noticed that your baby has stopped moving or is moving much less than normal.   Watch closely for changes in your health, and be sure to contact your doctor ifyou have any problems. Where can you learn more? Go to https://[email or web address].[email or web address] org and sign in to your [employer] account.  Enter V400 in the [facility] box to learn more about \"Counting Your Baby's Kicks: Care Instructions. \"     If you do not have an account, please click on the \"Sign Up Now\" link. Current as of: June 16, 2021               Content Version: 13.2  © 0408-7663 [employer]. Care instructions adapted under license by [facility] ([facility]). If you have questions about a medical condition or this instruction, always ask your healthcare professional. [address] any warranty or liability for your use of this information. Patient Education        Learning About When to Call Your Doctor During Pregnancy (After 20 Weeks)  Overview  It's common to have concerns about what might be a problem when you're pregnant. Most pregnancies don't have any serious problems. But it's still important to know when to call your doctor if you have certain symptoms orsigns of labor. These are general suggestions. Your doctor may give you some more informationabout when to call. When to call your doctor (after 20 weeks)  Call 911 anytime you think you may need emergency care. For example, call if:   You have severe vaginal bleeding.  You have sudden, severe pain in your belly.  You passed out (lost consciousness).  You have a seizure.  You see or feel the umbilical cord.  You think you are about to deliver your baby and can't make it safely to the hospital.  Call your doctor now or seek immediate medical care if:   You have vaginal bleeding.  You have belly pain.  You have a fever.  You have symptoms of preeclampsia, such as:  ? Sudden swelling of your face, hands, or feet. ? New vision problems (such as dimness, blurring, or seeing spots). ? A severe headache.  You have a sudden release of fluid from your vagina. (You think your water broke.)   You think that you may be in labor. This means that you've had at least 6 contractions in an hour.    You notice that your baby has stopped moving or is moving much less than normal.   You have symptoms of a urinary tract infection. These may include:  ? Pain or burning when you urinate. ? A frequent need to urinate without being able to pass much urine. ? Pain in the flank, which is just below the rib cage and above the waist on either side of the back. ? Blood in your urine. Watch closely for changes in your health, and be sure to contact your doctor if:   You have vaginal discharge that smells bad.  You have skin changes, such as:  ? A rash. ? Itching. ? Yellow color to your skin.  You have other concerns about your pregnancy. If you have labor signs at 37 weeks or more  If you have signs of labor at 37 weeks or more, your doctor may tell you tocall when your labor becomes more active. Symptoms of active labor include:   Contractions that are regular.  Contractions that are less than 5 minutes apart.  Contractions that are hard to talk through. Follow-up care is a key part of your treatment and safety. Be sure to make and go to all appointments, and call your doctor if you are having problems. It's also a good idea to know your test results and keep alist of the medicines you take. Where can you learn more? Go to [email or web address].[email or web address]. org and sign in to your [employer] account. Enter  in the [facility] box to learn more about \"Learning About When to Call Your Doctor During Pregnancy (After 20 Weeks). \"     If you do not have an account, please click on the \"Sign Up Now\" link. Current as of: June 16, 2021               Content Version: 13.2  © 2006-2022 Healthwise, Incorporated. Care instructions adapted under license by [facility] ([facility]). If you have questions about a medical condition or this instruction, always ask your healthcare professional. [place] [address] any warranty or liability for your use of this information.        Patient Education        Tdap (Tetanus, Diphtheria, Pertussis) Vaccine: What You Need to Know  Why get vaccinated? Tdap vaccine can prevent tetanus, diphtheria, and pertussis. Diphtheria and pertussis spread from person to person. Tetanus enters the bodythrough cuts or wounds.  TETANUS (T) causes painful stiffening of the muscles. Tetanus can lead to serious health problems, including being unable to open the mouth, having trouble swallowing and breathing, or death.  DIPHTHERIA (D) can lead to difficulty breathing, heart failure, paralysis, or death.  PERTUSSIS (aP), also known as \"whooping cough,\" can cause uncontrollable, violent coughing that makes it hard to breathe, eat, or drink. Pertussis can be extremely serious especially in babies and young children, causing pneumonia, convulsions, brain damage, or death. In teens and adults, it can cause weight loss, loss of bladder control, passing out, and rib fractures from severe coughing. Tdap vaccine  Tdap is only for children 7 years and older, adolescents, and adults. Adolescents should receive a single dose of Tdap, preferably at age 6 or 15 years. Pregnant people should get a dose of Tdap during every pregnancy, preferably during the early part of the third trimester, to help protect the  from pertussis. Infants are most at risk for severe, life-threatening complications frompertussis. Adults who have never received Tdap should get a dose of Tdap. Also, adults should receive a booster dose of either Tdap or Td (a different vaccine that protects against tetanus and diphtheria but not pertussis) every 10 years, or after 5 years in the case of a severe or dirty wound or burn. Tdap may be given at the same time as other vaccines.   Talk with your health care provider  Tell your vaccination provider if the person getting the vaccine:   Has had an allergic reaction after a previous dose of any vaccine that protects against tetanus, diphtheria, or pertussis, or has any severe, life-threatening allergies   Has had a coma, decreased level of consciousness, or prolonged seizures within 7 days after a previous dose of any pertussis vaccine (DTP, DTaP, or Tdap)   Has seizures or another nervous system problem   Has ever had Guillain-Barré Syndrome (also called \"GBS\")   Has had severe pain or swelling after a previous dose of any vaccine that protects against tetanus or diphtheria  In some cases, your health care provider may decide to postpone Tdapvaccination until a future visit. People with minor illnesses, such as a cold, may be vaccinated. People who are moderately or severely ill should usually wait until they recover beforegetting Tdap vaccine. Your health care provider can give you more information. Risks of a vaccine reaction   Pain, redness, or swelling where the shot was given, mild fever, headache, feeling tired, and nausea, vomiting, diarrhea, or stomachache sometimes happen after Tdap vaccination. People sometimes faint after medical procedures, including vaccination. Tellyour provider if you feel dizzy or have vision changes or ringing in the ears. As with any medicine, there is a very remote chance of a vaccine causing asevere allergic reaction, other serious injury, or death. What if there is a serious problem? An allergic reaction could occur after the vaccinated person leaves the clinic. If you see signs of a severe allergic reaction (hives, swelling of the face and throat, difficulty breathing, a fast heartbeat, dizziness, or weakness), call 9-1-1 and get the person to the nearest hospital.  For other signs that concern you, call your health care provider. Adverse reactions should be reported to the Vaccine Adverse Event Reporting System (VAERS). Your health care provider will usually file this report, or you can do it yourself. Visit the VAERS website at www.vaers. hhs.gov or call 6-431.675.7896.  VAERS is only for reporting reactions, and VAERS staff members do not give medical advice. The National Vaccine Injury Compensation Program  The National Vaccine Injury Compensation Program (VICP) is a federal program that was created to compensate people who may have been injured by certain vaccines. Claims regarding alleged injury or death due to vaccination have a time limit for filing, which may be as short as two years. Visit the VICP website at www.hrsa.gov/vaccinecompensation or call 5-283.674.1858 to learn about the program and about filing a claim. How can I learn more?  Ask your health care provider.  Call your local or state health department.  Visit the website of the Food and Drug Administration (FDA) for vaccine package inserts and additional information at www.fda.gov/vaccines-blood-biologics/vaccines.  Contact the Centers for Disease Control and Prevention (CDC):  ? Call 4-792.739.4869 (1-800-CDC-INFO) or  ? Visit CDC's website at www.cdc.gov/vaccines. Vaccine Information Statement  Tdap (Tetanus, Diphtheria, Pertussis) Vaccine  8/6/2021  [address] EMMANUEL Wilson 604CS-30  [facility] for Disease Control and Prevention  Many vaccine information statements are available in Fijian and other languages. See www.immunize.org/vis  Hojas de información sobre vacunas están disponibles en español y en muchos otros idiomas. Visite www.immunize.org/vis  Care instructions adapted under license by [facility] ([facility]). If you have questions about a medical condition or this instruction, always ask your healthcare professional. [place] [address] any warranty or liability for your use of this information.

## 2022-04-15 ENCOUNTER — ROUTINE PRENATAL (OUTPATIENT)
Dept: OBGYN CLINIC | Age: 32
End: 2022-04-15

## 2022-04-15 VITALS — SYSTOLIC BLOOD PRESSURE: 112 MMHG | WEIGHT: 196.8 LBS | DIASTOLIC BLOOD PRESSURE: 70 MMHG | BODY MASS INDEX: 34.31 KG/M2

## 2022-04-15 DIAGNOSIS — Z3A.25 25 WEEKS GESTATION OF PREGNANCY: Primary | ICD-10-CM

## 2022-04-15 DIAGNOSIS — Z34.02 SUPERVISION OF NORMAL FIRST PREGNANCY IN SECOND TRIMESTER: ICD-10-CM

## 2022-04-15 PROCEDURE — 0502F SUBSEQUENT PRENATAL CARE: CPT | Performed by: NURSE PRACTITIONER

## 2022-04-15 NOTE — PROGRESS NOTES
+FM, -Ctx, -LOF, -VB  Patient Active Problem List   Diagnosis    Transverse fetal lie     Blood pressure 112/70, weight 196 lb 12.8 oz (89.3 kg), last menstrual period 10/09/2021, not currently breastfeeding.   Reviewed kick counts, labor and pre eclampsia precautions  Feeling well  Good FM  28 week labs ordered  tDAP next visit

## 2022-05-11 ENCOUNTER — HOSPITAL ENCOUNTER (OUTPATIENT)
Age: 32
Setting detail: SPECIMEN
Discharge: HOME | End: 2022-05-11

## 2022-05-11 DIAGNOSIS — Z3A.25 25 WEEKS GESTATION OF PREGNANCY: ICD-10-CM

## 2022-05-11 LAB
ABSOLUTE EOS #: 0.13 K/UL (ref 0–0.4)
ABSOLUTE IMMATURE GRANULOCYTE: 0.66 K/UL (ref 0–0.3)
ABSOLUTE LYMPH #: 2.9 K/UL (ref 1–4.8)
ABSOLUTE MONO #: 0.53 K/UL (ref 0.1–0.8)
BASOPHILS # BLD: 0 % (ref 0–2)
BASOPHILS ABSOLUTE: 0 K/UL (ref 0–0.2)
CELLS COUNTED: 200
EOSINOPHILS RELATIVE PERCENT: 1 % (ref 1–4)
GLUCOSE ADMINISTRATION: NORMAL
GLUCOSE TOLERANCE SCREEN 50G: 111 MG/DL (ref 70–135)
HCT VFR BLD CALC: 35.4 % (ref 36.3–47.1)
HEMOGLOBIN: 11.9 G/DL (ref 11.9–15.1)
IMMATURE GRANULOCYTES: 5 %
LYMPHOCYTES # BLD: 22 % (ref 24–44)
MCH RBC QN AUTO: 29.8 PG (ref 25.2–33.5)
MCHC RBC AUTO-ENTMCNC: 33.6 G/DL (ref 28.4–34.8)
MCV RBC AUTO: 88.5 FL (ref 82.6–102.9)
MONOCYTES # BLD: 4 % (ref 1–7)
MORPHOLOGY: NORMAL
NRBC AUTOMATED: 0 PER 100 WBC
PDW BLD-RTO: 12.8 % (ref 11.8–14.4)
PLATELET # BLD: 212 K/UL (ref 138–453)
PMV BLD AUTO: 9.8 FL (ref 8.1–13.5)
RBC # BLD: 4 M/UL (ref 3.95–5.11)
SEG NEUTROPHILS: 68 % (ref 36–66)
SEGMENTED NEUTROPHILS ABSOLUTE COUNT: 8.98 K/UL (ref 1.8–7.7)
WBC # BLD: 13.2 K/UL (ref 3.5–11.3)

## 2022-05-16 ENCOUNTER — ROUTINE PRENATAL (OUTPATIENT)
Dept: OBGYN CLINIC | Age: 32
End: 2022-05-16
Payer: COMMERCIAL

## 2022-05-16 VITALS — WEIGHT: 205.6 LBS | DIASTOLIC BLOOD PRESSURE: 74 MMHG | BODY MASS INDEX: 35.85 KG/M2 | SYSTOLIC BLOOD PRESSURE: 122 MMHG

## 2022-05-16 DIAGNOSIS — Z3A.29 29 WEEKS GESTATION OF PREGNANCY: Primary | ICD-10-CM

## 2022-05-16 DIAGNOSIS — Z34.03 SUPERVISION OF NORMAL FIRST PREGNANCY IN THIRD TRIMESTER: ICD-10-CM

## 2022-05-16 PROCEDURE — 90715 TDAP VACCINE 7 YRS/> IM: CPT | Performed by: NURSE PRACTITIONER

## 2022-05-16 PROCEDURE — 0502F SUBSEQUENT PRENATAL CARE: CPT | Performed by: NURSE PRACTITIONER

## 2022-05-16 PROCEDURE — 90471 IMMUNIZATION ADMIN: CPT | Performed by: NURSE PRACTITIONER

## 2022-05-16 NOTE — PROGRESS NOTES
After obtaining consent, and per orders of Dedrick Collet, CNP, injection of TDAP given IM in Right deltoid by Robert English CMA. Patient instructed to remain in clinic for 20 minutes afterwards, and to report any adverse reaction to me immediately.   LOT [id number]   EXP 3/18/24  [address] [id number]

## 2022-05-16 NOTE — PATIENT INSTRUCTIONS
Patient Education        Weeks 30 to 28 of Your Pregnancy: Care Instructions  Overview     You've made it to the final months of your pregnancy! By now your baby isreally starting to look like a baby, with hair and plump skin. As you enter the final weeks of pregnancy, the reality of having a baby may start to set in. This is a good time to set up a safe nursery and find quality  if needed. Doing this stuff ahead of time will allow you to focus on caring for and enjoying your new baby. You may also want to take a tour of your hospital's labor and delivery unit. This will help you get a better idea ofwhat to expect while you're in the hospital.  During these last months, be sure to take good care of yourself. Pay attention to what your body needs. If your doctor says it's okay for you to work, don'tpush yourself too hard. If you haven't already had the Tdap shot during this pregnancy, talk to your doctor about getting it. It will help protect your  against pertussisinfection. Follow-up care is a key part of your treatment and safety. Be sure to make and go to all appointments, and call your doctor if you are having problems. It's also a good idea to know your test results and keep alist of the medicines you take. How can you care for yourself at home? Pay attention to your baby's movements   You should feel your baby move several times every day.  Your baby now turns less, and kicks and jabs more.  Your baby sleeps 20 to 45 minutes at a time and is more active at certain times of day.  If your doctor wants you to count your baby's kicks:  ? Empty your bladder, and lie on your side or relax in a comfortable chair. ? Write down your start time. ? Pay attention only to your baby's movements. Count any movement except hiccups. ? After you have counted 10 movements, write down your stop time. ? Write down how many minutes it took for your baby to move 10 times.   ? If an hour goes by and you have not recorded 10 movements, have something to eat or drink and then count for another hour. If you don't record at least 10 movements in the 2-hour period, call your doctor. Ease heartburn   Eat small, frequent meals.  Do not eat chocolate, peppermint, or very spicy foods. Avoid drinks with caffeine, such as coffee, tea, and sodas.  Avoid bending over or lying down after meals.  Take a short walk after you eat.  If heartburn is a problem at night, do not eat for 2 hours before bedtime.  Take antacids like Mylanta, Maalox, Rolaids, or Tums. Do not take antacids that have sodium bicarbonate. Care for varicose veins   Varicose veins are blood vessels that stretch out with the extra blood during pregnancy. Your legs may ache or throb. Most varicose veins will go away after the birth.  Avoid standing for long periods of time. Sit with your legs crossed at the ankles, not the knees.  Sit with your feet propped up.  Avoid tight clothing or stockings. Wear support hose.  Exercise regularly. Try walking for at least 30 minutes a day. Where can you learn more? Go to https://DoesThatMakeSense.compePopUp Leasing.Sigasi. org and sign in to your Nagi account. Enter G775 in the LiveHive box to learn more about \"Weeks 30 to 32 of Your Pregnancy: Care Instructions. \"     If you do not have an account, please click on the \"Sign Up Now\" link. Current as of: June 16, 2021               Content Version: 13.2  © 2006-2022 [employer]. Care instructions adapted under license by [facility] ([facility]). If you have questions about a medical condition or this instruction, always ask your healthcare professional. Reinaldo Dylon any warranty or liability for your use of this information. Patient Education        Counting Your Baby's Kicks: Care Instructions  Overview     Counting your baby's kicks is one way your doctor can tell that your baby is healthy.  Most women--especially in a first pregnancy--feel their baby move for the first time between 16 and 22 weeks. The movement may feel like flutters rather than kicks. Your baby may move more at certain times of the day. When you are active, you may notice less kicking than when you are resting. At yourprenatal visits, your doctor will ask whether the baby is active. In your last trimester, your doctor may ask you to count the number of timesyou feel your baby move. Follow-up care is a key part of your treatment and safety. Be sure to make and go to all appointments, and call your doctor if you are having problems. It's also a good idea to know your test results and keep alist of the medicines you take. How do you count fetal kicks?  A common method of checking your baby's movement is to note the length of time it takes to count ten movements (such as kicks, flutters, or rolls).  Pick your baby's most active time of day to count. This may be any time from morning to evening.  If you don't feel 10 movements in an hour, have something to eat or drink and count for another hour. If you don't feel at least 10 movements in the 2-hour period, call your doctor. When should you call for help? Call your doctor now or seek immediate medical care if:     You noticed that your baby has stopped moving or is moving much less than normal.   Watch closely for changes in your health, and be sure to contact your doctor ifyou have any problems. Where can you learn more? Go to https://EndoDexpePenelope's Purse.WebAction. org and sign in to your Vixar account. Enter G578 in the Boomi box to learn more about \"Counting Your Baby's Kicks: Care Instructions. \"     If you do not have an account, please click on the \"Sign Up Now\" link. Current as of: June 16, 2021               Content Version: 13.2  © 3706-7943 Healthwise, Incorporated. Care instructions adapted under license by [facility] [facility] ([facility]).  If you have questions about a medical condition or this instruction, always ask your healthcare professional. [place] [address] any warranty or liability for your use of this information. Patient Education        Learning About When to Call Your Doctor During Pregnancy (After 20 Weeks)  Overview  It's common to have concerns about what might be a problem when you're pregnant. Most pregnancies don't have any serious problems. But it's still important to know when to call your doctor if you have certain symptoms orsigns of labor. These are general suggestions. Your doctor may give you some more informationabout when to call. When to call your doctor (after 20 weeks)  Call 911 anytime you think you may need emergency care. For example, call if:   You have severe vaginal bleeding.  You have sudden, severe pain in your belly.  You passed out (lost consciousness).  You have a seizure.  You see or feel the umbilical cord.  You think you are about to deliver your baby and can't make it safely to the hospital.  Call your doctor now or seek immediate medical care if:   You have vaginal bleeding.  You have belly pain.  You have a fever.  You have symptoms of preeclampsia, such as:  ? Sudden swelling of your face, hands, or feet. ? New vision problems (such as dimness, blurring, or seeing spots). ? A severe headache.  You have a sudden release of fluid from your vagina. (You think your water broke.)   You think that you may be in labor. This means that you've had at least 6 contractions in an hour.  You notice that your baby has stopped moving or is moving much less than normal.   You have symptoms of a urinary tract infection. These may include:  ? Pain or burning when you urinate. ? A frequent need to urinate without being able to pass much urine. ? Pain in the flank, which is just below the rib cage and above the waist on either side of the back. ? Blood in your urine.   Watch closely for changes in your health, and be sure to contact your doctor if:   You have vaginal discharge that smells bad.  You have skin changes, such as:  ? A rash. ? Itching. ? Yellow color to your skin.  You have other concerns about your pregnancy. If you have labor signs at 37 weeks or more  If you have signs of labor at 37 weeks or more, your doctor may tell you tocall when your labor becomes more active. Symptoms of active labor include:   Contractions that are regular.  Contractions that are less than 5 minutes apart.  Contractions that are hard to talk through. Follow-up care is a key part of your treatment and safety. Be sure to make and go to all appointments, and call your doctor if you are having problems. It's also a good idea to know your test results and keep alist of the medicines you take. Where can you learn more? Go to https://chmarkeb.Incline Therapeutics. org and sign in to your Kiwup account. Enter  in the AudioPixels box to learn more about \"Learning About When to Call Your Doctor During Pregnancy (After 20 Weeks). \"     If you do not have an account, please click on the \"Sign Up Now\" link. Current as of: June 16, 2021               Content Version: 13.2  © 2006-2022 Healthwise, Incorporated. Care instructions adapted under license by [facility] [address] If you have questions about a medical condition or this instruction, always ask your healthcare professional. [place] [address] any warranty or liability for your use of this information.

## 2022-05-18 ENCOUNTER — CARE COORDINATION (OUTPATIENT)
Dept: OTHER | Facility: CLINIC | Age: 32
End: 2022-05-18

## 2022-05-18 NOTE — CARE COORDINATION
ACM attempted to reach patient for Maternity CM follow-up call. HIPAA compliant message left requesting a return phone call. Will attempt to outreach patient again. MIMI Nielsen, RN  Associate Care Manager   Cell: [phone]  [email or web address]

## 2022-05-31 NOTE — PROGRESS NOTES
Prenatal Visit    Pankaj Hunter is a 33 y.o. female  at 35w11d    The patient was seen and evaluated. Reports positive fetal movements. She denies headache, vision changes, RUQ pain, contractions, vaginal bleeding and leakage of fluid. The patient was instructed on fetal kick counts and was given a kick sheet to complete every 8 hours. She was instructed that the baby should move at a minimum of ten times within one hour after a meal. The patient was instructed to lay down on her left side twenty minutes after eating and count movements for up to one hour with a target value of ten movements. She was instructed to notify the office if she did not make that target after two attempts or if after any attempt there was less than four movements. The patient admits to that the targets have been made. The patient already received the T-Dap Vaccine (27-36 weeks) this pregnancy. The patient already received the influenza vaccine this year. The problem list reflects the active issues addressed during today's visit    Vitals:    BP: 118/70  Weight: 207 lb (93.9 kg)  Fundal Height (cm): 32 cm  Fetal Heart Rate: 172  Movement: Present     NST:   Fetal heart rate baseline: 150, moderate variability, accelerations present, decelerations absent    The tracing has been reviewed and is considered reactive. 28 Week Labs: The patient is RH +, Rhogam not indicated  ABO/Rh   Date Value Ref Range Status   2021 O POSITIVE  Final       1hr GTT: 111   28 week CBC:   Lab Results   Component Value Date    WBC 13.2 (H) 2022    HGB 11.9 2022    HCT 35.4 (L) 2022    MCV 88.5 2022     2022     UA w/ Ur C&S: neg     Assessment & Plan:  Pankaj Hunter is a 33 y.o. female  at 31w5d   - 28 week labs completed   - discussed recommendations for TDAP immunization, patient already received TDAP.    - Next US appointment 2 weeks for presentation   - Previously COVID vaccinated Patient Active Problem List    Diagnosis Date Noted    Transverse fetal lie 03/14/2022     Repeat US at 32 weeks       Return in about 2 weeks (around 6/15/2022) for Christine Chavez 9038.           DO Tanmay Alvarado [facility]   6/1/2022, 10:04 AM

## 2022-06-01 ENCOUNTER — ROUTINE PRENATAL (OUTPATIENT)
Dept: OBGYN CLINIC | Age: 32
End: 2022-06-01
Payer: COMMERCIAL

## 2022-06-01 VITALS
DIASTOLIC BLOOD PRESSURE: 70 MMHG | BODY MASS INDEX: 35.34 KG/M2 | SYSTOLIC BLOOD PRESSURE: 118 MMHG | WEIGHT: 207 LBS | HEIGHT: 64 IN

## 2022-06-01 DIAGNOSIS — O36.8390 FETAL TACHYCARDIA AFFECTING MANAGEMENT OF MOTHER: ICD-10-CM

## 2022-06-01 DIAGNOSIS — O32.2XX0 TRANSVERSE LIE OF FETUS, SINGLE OR UNSPECIFIED FETUS: ICD-10-CM

## 2022-06-01 DIAGNOSIS — Z3A.31 31 WEEKS GESTATION OF PREGNANCY: Primary | ICD-10-CM

## 2022-06-01 DIAGNOSIS — O09.A0: ICD-10-CM

## 2022-06-01 PROCEDURE — 0502F SUBSEQUENT PRENATAL CARE: CPT | Performed by: STUDENT IN AN ORGANIZED HEALTH CARE EDUCATION/TRAINING PROGRAM

## 2022-06-01 PROCEDURE — 59025 FETAL NON-STRESS TEST: CPT | Performed by: STUDENT IN AN ORGANIZED HEALTH CARE EDUCATION/TRAINING PROGRAM

## 2022-06-14 ENCOUNTER — CARE COORDINATION (OUTPATIENT)
Dept: OTHER | Facility: CLINIC | Age: 32
End: 2022-06-14

## 2022-06-14 NOTE — PATIENT INSTRUCTIONS
Patient Education        Weeks 32 to 29 of Your Pregnancy: Care Instructions  Overview     During the last few weeks of your pregnancy, you may have more aches and pains. It's important to rest when you can. Your growing baby is putting more pressure on your bladder. So you may need to urinate more often. Hemorrhoids are also common. These are painful, itchy veinsin the rectal area. You may want to talk with your doctor about banking your baby's umbilical cord blood. This is the blood left in the cord after birth. If you want to save thisblood, you must arrange it ahead of time. You can't decide at the last minute. If you haven't already had the Tdap shot during this pregnancy, talk to your doctor about getting it. It will help protect your  against pertussisinfection. Follow-up care is a key part of your treatment and safety. Be sure to make and go to all appointments, and call your doctor if you are having problems. It's also a good idea to know your test results and keep alist of the medicines you take. How can you care for yourself at home? Ease hemorrhoids   Get more liquids, fruits, vegetables, and fiber in your diet. This will help keep your stools soft.  Avoid sitting for too long. Lie on your left side several times a day.  Clean yourself with soft, moist toilet paper. Or you can use witch hazel pads or personal hygiene pads.  If you are uncomfortable, try ice packs. Or you can sit in a warm sitz bath. Do these for 20 minutes at a time, as needed.  Use hydrocortisone cream for pain and itching. Two examples are Anusol and Preparation H Hydrocortisone.  Ask your doctor about taking an over-the-counter stool softener. Consider breastfeeding   Experts recommend breastfeeding for 1 year or longer.  Breast milk may help protect your child from some health problems.  babies are less likely than formula-fed babies to:  ? Get ear infections, colds, diarrhea, and pneumonia. ?  Be obese or get diabetes later in life.  Breastfeeding causes the release of a hormone called oxytocin. This hormone may help your uterus shrink back faster.  Breastfeeding may help you lose weight faster. Making milk burns calories.  Breastfeeding can lower your risk of breast cancer, ovarian cancer, and osteoporosis. Decide about circumcision for your baby   As you make this decision, it may help to think about your personal, Samaritan, and family traditions. You get to decide if you will keep your baby's penis natural or if your baby will be circumcised.  If you decide that you would like to have your baby circumcised, talk with your doctor. You can share your concerns about pain. And you can discuss your preferences for anesthesia. Where can you learn more? Go to https://hCentivepepiceweb.NEONC Technologies. org and sign in to your Snapfish account. Enter R529 in the Everdream box to learn more about \"Weeks 32 to 34 of Your Pregnancy: Care Instructions. \"     If you do not have an account, please click on the \"Sign Up Now\" link. Current as of: June 16, 2021               Content Version: 13.2  © 3131-5914 SpinPunch. Care instructions adapted under license by [facility] ([facility]). If you have questions about a medical condition or this instruction, always ask your healthcare professional. [place] [address] any warranty or liability for your use of this information. Patient Education        Counting Your Baby's Kicks: Care Instructions  Overview     Counting your baby's kicks is one way your doctor can tell that your baby is healthy. Most women--especially in a first pregnancy--feel their baby move for the first time between 16 and 22 weeks. The movement may feel like flutters rather than kicks. Your baby may move more at certain times of the day. When you are active, you may notice less kicking than when you are resting.  At yourprenatal visits, your doctor will ask might be a problem when you're pregnant. Most pregnancies don't have any serious problems. But it's still important to know when to call your doctor if you have certain symptoms orsigns of labor. These are general suggestions. Your doctor may give you some more informationabout when to call. When to call your doctor (after 20 weeks)  Call 911 anytime you think you may need emergency care. For example, call if:   You have severe vaginal bleeding.  You have sudden, severe pain in your belly.  You passed out (lost consciousness).  You have a seizure.  You see or feel the umbilical cord.  You think you are about to deliver your baby and can't make it safely to the hospital.  Call your doctor now or seek immediate medical care if:   You have vaginal bleeding.  You have belly pain.  You have a fever.  You have symptoms of preeclampsia, such as:  ? Sudden swelling of your face, hands, or feet. ? New vision problems (such as dimness, blurring, or seeing spots). ? A severe headache.  You have a sudden release of fluid from your vagina. (You think your water broke.)   You think that you may be in labor. This means that you've had at least 6 contractions in an hour.  You notice that your baby has stopped moving or is moving much less than normal.   You have symptoms of a urinary tract infection. These may include:  ? Pain or burning when you urinate. ? A frequent need to urinate without being able to pass much urine. ? Pain in the flank, which is just below the rib cage and above the waist on either side of the back. ? Blood in your urine. Watch closely for changes in your health, and be sure to contact your doctor if:   You have vaginal discharge that smells bad.  You have skin changes, such as:  ? A rash. ? Itching. ? Yellow color to your skin.  You have other concerns about your pregnancy.   If you have labor signs at 37 weeks or more  If you have signs of labor at 37 weeks or more, your doctor may tell you tocall when your labor becomes more active. Symptoms of active labor include:   Contractions that are regular.  Contractions that are less than 5 minutes apart.  Contractions that are hard to talk through. Follow-up care is a key part of your treatment and safety. Be sure to make and go to all appointments, and call your doctor if you are having problems. It's also a good idea to know your test results and keep alist of the medicines you take. Where can you learn more? Go to https://ZAINA PHARMApePlacely.Bare Tree Media. org and sign in to your XanEdu account. Enter  in the YouDroop LTD box to learn more about \"Learning About When to Call Your Doctor During Pregnancy (After 20 Weeks). \"     If you do not have an account, please click on the \"Sign Up Now\" link. Current as of: June 16, 2021               Content Version: 13.2  © 7285-2826 Healthwise, Incorporated. Care instructions adapted under license by [facility] ([facility]). If you have questions about a medical condition or this instruction, always ask your healthcare professional. [place] [address] any warranty or liability for your use of this information.

## 2022-06-14 NOTE — CARE COORDINATION
Last Discharge [facility]       Complaint Diagnosis Description Type Department Provider    18 Laceration Laceration of labia majora, initial encounter ED (DISCHARGE) [facility]  ED Clista Skiff, MD          [address] contacted the patient by telephone to discuss the maternity management program.  Patient agrees to care management services at this time. Verified name and  with patient as identifiers. Risk Factors Identified: None     Needs to be reviewed by the provider   none         Method of communication with provider : none    Advance Care Planning:   Does patient have an Advance Directive:  reviewed and current. Does patient have OB/Gyn Selected? Yes    Discussed follow up appointments. If no appointment was previously scheduled, appointment scheduling offered: Yes  [facility] follow up appointment(s):   Future Appointments   Date Time Provider Manfred Baez   6/15/2022  7:00 AM LENNY Graham CNP [facility]   2022  7:30 AM LENNY Graham CNP [facility]   2022  9:00 AM Rossy Mayo DO [facility]   2022  7:30 AM Princess Stock MD [facility]   2022  7:00 AM LENNY Graham CNP [facility]   8/3/2022  9:30 AM Rossy Mayo DO [facility]     [facility](s): n/a    OB History:   OB History    Para Term  AB Living   1 0 0 0 0 0   SAB IAB Ectopic Molar Multiple Live Births   0 0 0 0 0 0      # Outcome Date GA Lbr José Miguel/2nd Weight Sex Delivery Anes PTL Lv   1 Current                33w4d    Medication reconciliation was performed with patient, who verbalizes understanding of administration of home medications. Advised obtaining a 90-day supply of all daily and as-needed medications. Barriers/Support system:  patient and spouse  Return to work planning?  Yes      2nd and 3rd Trimester Focused Assessment:   Healthy behavior review  MFM referral needed? No  Fetal movement-every day  Red flags: bleeding/leaking  Labor signs and symptoms-None  Birth planning:  planned  Maternity Classes? No        Patient given an opportunity to ask questions and does not have any further questions or concerns at this time. Were discharge instructions available to patient? Yes. Reviewed appropriate site of care based on symptoms and resources available to patient including: PCP  Specialist  Benefits related nurse triage line  Urgent care clinics  [facility]   When to call [address] The patient agrees to contact the OB/Gyn office for questions related to their healthcare. Plan for a follow-up call 4 weeks based on severity of symptoms and risk factors. Plan for next call: symptom management-any new s/s?  self management-any new concerns?  follow up appointment-how was last OB appointment? Goals      Attends follow-up appointments as directed       Educate and encourage importance of FU for prevention of complications or disease;   Assess the patient's relationship with a PCP and next FU visit scheduled;   Discuss importance of adherence to treatment plan and follow up visits;   Identify any barriers in transportation or access to FU appointments.  Assist patient with making FU appointments as needed;    It's also a good idea to know your test results.  Keep a list of the medicines you take.  Patient will identify signs and symptoms requiring evaluation and intervention      · Demonstrate how client is to evaluate contraction activity after discharge (e.g., lying down, tilted to the side with a pillow to the back, placing fingertips on the fundus for approximately 1 hour to note hardening or tightening of the uterus). · Identify signs and/or symptoms that should be reported immediately to the healthcare provider (e.g, sustained uterine contractions, clear drainage from vagina, bleeding).   · Provide information about follow-up care when client is discharged. · Advise client to empty bladder every two (2) hours while awake  · Review daily fluid need; avoid coffee. · Encourage regular rest periods 2-3 times a day in side-lying position. If bedrest is to be continued after discharge, suggest client spend part of day on couch or recliner. Assist patient to identify any risky behaviors and support change;     Red flags maternity  Call 911 anytime you think you may need emergency care. For example, call if:  Call your doctor now or seek immediate medical care if:     You passed out (lost consciousness). You have a seizure. You have severe vaginal bleeding. You have severe pain in your belly or pelvis. You have had fluid gushing or leaking from your vagina and you know or think the  umbilical cord is bulging into your vagina. If this happens, immediately get down on  your knees so your rear end (buttocks) is higher than your head. This will decrease  the pressure on the cord until help arrives. You have signs of preeclampsia, such as:  Sudden swelling of your face, hands, or feet. New vision problems (such as dimness, blurring, or seeing spots). A severe headache. You have any vaginal bleeding. You have belly pain or cramping. You have a fever. You have had regular contractions (with or without pain) for an hour. This means that  you have 8 or more within 1 hour or 4 or more in 20 minutes after you change your  position and drink fluids. You have a sudden release of fluid from your vagina. You have low back pain or pelvic pressure that does not go away. You notice that your baby has stopped moving or is moving much less than normal.          Summary Note: Patient reports that she is doing well. She denies any red-flag s/s or needs r/t DME, medications or appointments. She is now 33 weeks pregnant, due on 7/29/22. Patients pregnancy is not complicated by anything.  Patient states that she does not have new concerns or changes in her medications or history. She did participate in [facility] and has completed the program at this time. She denies any needs at this time and is appreciative of the follow-up call. She is agreeable to a follow-up call with ACM in 4 weeks and will call with any needs in the meantime. MIMI Cobos, RN  Associate Care Manager   Cell: [phone]  [email or web address]

## 2022-06-15 ENCOUNTER — ROUTINE PRENATAL (OUTPATIENT)
Dept: OBGYN CLINIC | Age: 32
End: 2022-06-15

## 2022-06-15 VITALS — DIASTOLIC BLOOD PRESSURE: 82 MMHG | BODY MASS INDEX: 36.79 KG/M2 | WEIGHT: 211 LBS | SYSTOLIC BLOOD PRESSURE: 130 MMHG

## 2022-06-15 DIAGNOSIS — Z3A.33 33 WEEKS GESTATION OF PREGNANCY: Primary | ICD-10-CM

## 2022-06-15 DIAGNOSIS — O32.2XX0 TRANSVERSE LIE OF FETUS, SINGLE OR UNSPECIFIED FETUS: ICD-10-CM

## 2022-06-15 DIAGNOSIS — O09.A0: ICD-10-CM

## 2022-06-15 PROCEDURE — 0502F SUBSEQUENT PRENATAL CARE: CPT | Performed by: NURSE PRACTITIONER

## 2022-06-15 RX ORDER — OMEPRAZOLE 20 MG/1
20 CAPSULE, DELAYED RELEASE ORAL
Qty: 90 CAPSULE | Refills: 0 | Status: SHIPPED | OUTPATIENT
Start: 2022-06-15 | End: 2022-08-03

## 2022-06-15 NOTE — PROGRESS NOTES
+FM, -Ctx, -LOF, -VB  Patient Active Problem List   Diagnosis    Transverse fetal lie     Blood pressure 130/82, weight 211 lb (95.7 kg), last menstrual period 10/09/2021, not currently breastfeeding.   Reviewed kick counts, labor and pre eclampsia precautions  Feeling well  Good FM  Having some GERD- Prilosec sent and discussed behavioral changes  Working on getting breast pump- calling ins

## 2022-06-29 ENCOUNTER — HOSPITAL ENCOUNTER (OUTPATIENT)
Age: 32
Setting detail: SPECIMEN
Discharge: HOME | End: 2022-06-29

## 2022-06-29 ENCOUNTER — ROUTINE PRENATAL (OUTPATIENT)
Dept: OBGYN CLINIC | Age: 32
End: 2022-06-29

## 2022-06-29 VITALS — SYSTOLIC BLOOD PRESSURE: 128 MMHG | BODY MASS INDEX: 37.14 KG/M2 | WEIGHT: 213 LBS | DIASTOLIC BLOOD PRESSURE: 84 MMHG

## 2022-06-29 DIAGNOSIS — Z3A.35 35 WEEKS GESTATION OF PREGNANCY: ICD-10-CM

## 2022-06-29 DIAGNOSIS — Z3A.35 35 WEEKS GESTATION OF PREGNANCY: Primary | ICD-10-CM

## 2022-06-29 DIAGNOSIS — O09.A0: ICD-10-CM

## 2022-06-29 PROCEDURE — 0502F SUBSEQUENT PRENATAL CARE: CPT | Performed by: NURSE PRACTITIONER

## 2022-06-29 NOTE — PROGRESS NOTES
+FM, -Ctx, -LOF, -VB  Patient Active Problem List   Diagnosis    Transverse fetal lie     Blood pressure 128/84, weight 213 lb (96.6 kg), last menstrual period 10/09/2021, not currently breastfeeding. Reviewed kick counts, labor and pre eclampsia precautions  Feeling well  Good FM  Working on getting breast pump  Planning POP  GBS collected today  Has noticed some swelling in hands and feet. Denies epigastric pain, visual disturbances and HA. Reviewed s/sx PreE and to notify office w/any changes  Mild edema noted BL hands and feet. Non-pitting.  Encouraged hydration, avoid extra sodium and elevate LE when seated  Prilosec helping GERD

## 2022-06-29 NOTE — PATIENT INSTRUCTIONS
Patient Education        Weeks 34 to 39 of Your Pregnancy: Care Instructions  Overview     By now, your baby and your belly have grown quite large. It's almost time to give birth! Your baby's lungs are almost ready to breathe air. The skull bones are firm enough to protect your baby's head, but soft enough to move downthrough the birth canal.  You may be feeling excited and happy at times--but also anxious or scared. You might wonder how you'll know if you're in labor or what to expect during labor. Try to be open and flexible in your expectations of the birth. Because each birth is different, there's no way to know exactly what childbirth will be likefor you. Talk to your doctor or midwife about any concerns you have. If you haven't already had the Tdap shot during this pregnancy, talk to your doctor about getting it. It will help protect your  against pertussisinfection. In the 36th week, you'll probably have a test for group B streptococcus (GBS). GBS is a common type of bacteria that can live in the vagina and rectum. It can make your baby sick after birth. If you test positive, you will get antibioticsduring labor. The medicine will help keep your baby from getting the bacteria. Follow-up care is a key part of your treatment and safety. Be sure to make and go to all appointments, and call your doctor if you are having problems. It's also a good idea to know your test results and keep alist of the medicines you take. How can you care for yourself at home? Learn about pain relief choices   Pain is different for everyone. Talk with your doctor about your feelings about pain.  You can choose from several types of pain relief. These include medicine, breathing techniques, and comfort measures. You can use more than one option.  If you choose to have pain medicine during labor, talk to your doctor about your options. Some medicines lower anxiety and help with some of the pain.  Others make your lower body numb so that you won't feel pain.  Be sure to tell your doctor about your pain medicine choice before you start labor or very early in your labor. You may be able to change your mind as labor progresses. Labor and delivery   The first stage of labor has three parts: early, active, and transition. ? It's common to have early labor at home. You can stay busy or rest, eat light snacks, drink clear fluids, and start counting contractions. ? When talking during a contraction gets hard, you may be moving to active labor. During active labor, you should head for the hospital if you aren't there already. ? You are in active labor when contractions come every 3 to 4 minutes and last about 60 seconds. Your cervix is opening more rapidly. ? If your water breaks, contractions will come faster and stronger. ? During transition, your cervix is stretching, and contractions are coming more rapidly. ? You may want to push, but your cervix might not be ready. Your doctor will tell you when to push.  The second stage starts when your cervix is completely opened and you are ready to push. ? Contractions are very strong to push the baby down the birth canal.  ? You will probably feel the urge to push. You may feel like you need to have a bowel movement. ? You may be coached to push with contractions. These contractions will be very strong, but you won't have them as often. You can get a little rest between contractions. ? One last push, and your baby is born.  The third stage is when a few more contractions push out the placenta. This may take 30 minutes or less. Where can you learn more? Go to https://Revaluatekathy.TalkMarkets. org and sign in to your ProPublica account. Enter S982 in the hdl therapeutics box to learn more about \"Weeks 34 to 36 of Your Pregnancy: Care Instructions. \"     If you do not have an account, please click on the \"Sign Up Now\" link.   Current as of: February 23, 7499               AQBLWWO Version: 13.3  © 3827-1308 [employer]. Care instructions adapted under license by [facility] ([facility]). If you have questions about a medical condition or this instruction, always ask your healthcare professional. [address] any warranty or liability for your use of this information. Patient Education        Counting Your Baby's Kicks: Care Instructions  Overview     Counting your baby's kicks is one way your doctor can tell that your baby is healthy. Most women--especially in a first pregnancy--feel their baby move for the first time between 16 and 22 weeks. The movement may feel like flutters rather than kicks. Your baby may move more at certain times of the day. When you are active, you may notice less kicking than when you are resting. At yourprenatal visits, your doctor will ask whether the baby is active. In your last trimester, your doctor may ask you to count the number of timesyou feel your baby move. Follow-up care is a key part of your treatment and safety. Be sure to make and go to all appointments, and call your doctor if you are having problems. It's also a good idea to know your test results and keep alist of the medicines you take. How do you count fetal kicks?  A common method of checking your baby's movement is to note the length of time it takes to count ten movements (such as kicks, flutters, or rolls).  Pick your baby's most active time of day to count. This may be any time from morning to evening.  If you don't feel 10 movements in an hour, have something to eat or drink and count for another hour. If you don't feel at least 10 movements in the 2-hour period, call your doctor. When should you call for help?    Call your doctor now or seek immediate medical care if:     You noticed that your baby has stopped moving or is moving much less than normal.   Watch closely for changes in your health, and be sure to contact your doctor ifyou have any problems. Where can you learn more? Go to https://chpepiceweb.Mesosphere. org and sign in to your Sleepy's account. Enter G837 in the M5 Networks box to learn more about \"Counting Your Baby's Kicks: Care Instructions. \"     If you do not have an account, please click on the \"Sign Up Now\" link. Current as of: 2022               Content Version: 13.3   [employer]Man[employer]. Care instructions adapted under license by [facility] ([facility]). If you have questions about a medical condition or this instruction, always ask your healthcare professional. [address] any warranty or liability for your use of this information. Patient Education        Group B Strep During Pregnancy: Care Instructions  Overview     Group B strep infection is caused by a type of bacteria. It's a different kindof bacteria than the kind that causes strep throat. You may have this kind of bacteria in your body. Sometimes it may cause an infection, but most of the time it doesn't make you sick or cause symptoms. But if you pass the bacteria to your baby during the birth, it can cause serioushealth problems for your baby. If you have this bacteria in your body, you will get antibiotics when you arein labor. Antibiotics help prevent problems for a  baby. After birth, doctors will watch and may test your baby. If your baby testspositive for Group B strep, your baby will get antibiotics. If you plan to breastfeed your baby, don't worry. It will be safe to breastfeed. Follow-up care is a key part of your treatment and safety. Be sure to make and go to all appointments, and call your doctor if you are having problems. It's also a good idea to know your test results and keep alist of the medicines you take. How can you care for yourself at home?  If your doctor has prescribed antibiotics, take them as directed.  Do not stop taking them just because you feel better. You need to take the full course of antibiotics.  Tell your doctor if you are allergic to any antibiotic.  If you go into labor, or your water breaks, go to the hospital. Your doctor will give you antibiotics to help protect your baby from infection.  Tell the doctors and nurses if you have an allergy to penicillin.  Tell the doctors and nurses at the hospital that you tested positive for group B strep. When should you call for help? Call your doctor now or seek immediate medical care if:     You have symptoms of a urinary tract infection. These may include:  ? Pain or burning when you urinate. ? A frequent need to urinate without being able to pass much urine. ? Pain in the flank, which is just below the rib cage and above the waist on either side of the back. ? Blood in your urine. ? A fever.      You think you are in labor or your water has broken.      You have pain in your belly or pelvis. Watch closely for changes in your health, and be sure to contact your doctor ifyou have any problems. Where can you learn more? Go to https://Inventarium.mobi.StillSecure. org and sign in to your [employer] account. Enter M001 in the [facility] box to learn more about \"Group B Strep During Pregnancy: Care Instructions. \"     If you do not have an account, please click on the \"Sign Up Now\" link. Current as of: February 23, 2022               Content Version: 13.3  © 2006-2022 Healthwise, Incorporated. Care instructions adapted under license by [facility] ([facility]). If you have questions about a medical condition or this instruction, always ask your healthcare professional. [place] [address] any warranty or liability for your use of this information. Patient Education        Learning About When to Call Your Doctor During Pregnancy (After 20 Weeks)  Overview  It's common to have concerns about what might be a problem when you're pregnant.  Most pregnancies don't have any serious problems. But it's still important to know when to call your doctor if you have certain symptoms orsigns of labor. These are general suggestions. Your doctor may give you some more informationabout when to call. When to call your doctor (after 20 weeks)  Call 911 anytime you think you may need emergency care. For example, call if:   You have severe vaginal bleeding.  You have sudden, severe pain in your belly.  You passed out (lost consciousness).  You have a seizure.  You see or feel the umbilical cord.  You think you are about to deliver your baby and can't make it safely to the hospital.  Call your doctor now or seek immediate medical care if:   You have vaginal bleeding.  You have belly pain.  You have a fever.  You have symptoms of preeclampsia, such as:  ? Sudden swelling of your face, hands, or feet. ? New vision problems (such as dimness, blurring, or seeing spots). ? A severe headache.  You have a sudden release of fluid from your vagina. (You think your water broke.)   You think that you may be in labor. This means that you've had at least 6 contractions in an hour.  You notice that your baby has stopped moving or is moving much less than normal.   You have symptoms of a urinary tract infection. These may include:  ? Pain or burning when you urinate. ? A frequent need to urinate without being able to pass much urine. ? Pain in the flank, which is just below the rib cage and above the waist on either side of the back. ? Blood in your urine. Watch closely for changes in your health, and be sure to contact your doctor if:   You have vaginal discharge that smells bad.  You have skin changes, such as:  ? A rash. ? Itching. ? Yellow color to your skin.  You have other concerns about your pregnancy. If you have labor signs at 37 weeks or more  If you have signs of labor at 37 weeks or more, your doctor may tell you tocall when your labor becomes more active. Symptoms of active labor include:   Contractions that are regular.  Contractions that are less than 5 minutes apart.  Contractions that are hard to talk through. Follow-up care is a key part of your treatment and safety. Be sure to make and go to all appointments, and call your doctor if you are having problems. It's also a good idea to know your test results and keep alist of the medicines you take. Where can you learn more? Go to https://Bespoke Postpebhupinderewkenny.Scurri. org and sign in to your Knotice account. Enter  in the Palingen box to learn more about \"Learning About When to Call Your Doctor During Pregnancy (After 20 Weeks). \"     If you do not have an account, please click on the \"Sign Up Now\" link. Current as of: February 23, 2022               Content Version: 13.3  © 0070-7820 Healthwise, Incorporated. Care instructions adapted under license by [facility] ([facility]). If you have questions about a medical condition or this instruction, always ask your healthcare professional. [address] any warranty or liability for your use of this information.

## 2022-07-02 LAB
CULTURE: NORMAL
SPECIMEN DESCRIPTION: NORMAL

## 2022-07-13 ENCOUNTER — ROUTINE PRENATAL (OUTPATIENT)
Dept: OBGYN CLINIC | Age: 32
End: 2022-07-13

## 2022-07-13 VITALS — BODY MASS INDEX: 37.84 KG/M2 | WEIGHT: 217 LBS | DIASTOLIC BLOOD PRESSURE: 79 MMHG | SYSTOLIC BLOOD PRESSURE: 120 MMHG

## 2022-07-13 DIAGNOSIS — O32.2XX0 TRANSVERSE LIE OF FETUS, SINGLE OR UNSPECIFIED FETUS: ICD-10-CM

## 2022-07-13 DIAGNOSIS — Z3A.37 37 WEEKS GESTATION OF PREGNANCY: Primary | ICD-10-CM

## 2022-07-13 PROCEDURE — 0502F SUBSEQUENT PRENATAL CARE: CPT | Performed by: STUDENT IN AN ORGANIZED HEALTH CARE EDUCATION/TRAINING PROGRAM

## 2022-07-14 ENCOUNTER — TELEPHONE (OUTPATIENT)
Dept: OBGYN CLINIC | Age: 32
End: 2022-07-14

## 2022-07-14 NOTE — TELEPHONE ENCOUNTER
[address]: [facility]  Date: 7/22/22  Time: 1000 AM for Dr. Milton Traore      Thanks so much!!    Chandni Cline, [address] [facility]   7/14/2022, 2:16 PM

## 2022-07-14 NOTE — TELEPHONE ENCOUNTER
Pt aware RR IOL 7-22-22 w/ Dr. Ryan Paula   Pt aware time/date could be changed based on bed availability

## 2022-07-19 PROBLEM — Z34.03 ENCOUNTER FOR SUPERVISION OF NORMAL FIRST PREGNANCY IN THIRD TRIMESTER: Status: ACTIVE | Noted: 2022-07-19

## 2022-07-20 ENCOUNTER — ROUTINE PRENATAL (OUTPATIENT)
Dept: OBGYN CLINIC | Age: 32
End: 2022-07-20

## 2022-07-20 VITALS
BODY MASS INDEX: 37.66 KG/M2 | HEART RATE: 104 BPM | WEIGHT: 216 LBS | DIASTOLIC BLOOD PRESSURE: 86 MMHG | SYSTOLIC BLOOD PRESSURE: 121 MMHG

## 2022-07-20 DIAGNOSIS — Z34.03 ENCOUNTER FOR SUPERVISION OF NORMAL FIRST PREGNANCY IN THIRD TRIMESTER: Primary | ICD-10-CM

## 2022-07-20 PROCEDURE — 0502F SUBSEQUENT PRENATAL CARE: CPT | Performed by: OBSTETRICS & GYNECOLOGY

## 2022-07-22 ENCOUNTER — HOSPITAL ENCOUNTER (INPATIENT)
Age: 32
LOS: 4 days | Discharge: HOME | End: 2022-07-26
Attending: OBSTETRICS & GYNECOLOGY | Admitting: OBSTETRICS & GYNECOLOGY
Payer: COMMERCIAL

## 2022-07-22 PROBLEM — O09.90 HIGH-RISK PREGNANCY, UNSPECIFIED TRIMESTER: Status: ACTIVE | Noted: 2022-07-22

## 2022-07-22 LAB
ABO/RH: NORMAL
ABSOLUTE EOS #: 0.12 K/UL (ref 0–0.44)
ABSOLUTE IMMATURE GRANULOCYTE: 0.21 K/UL (ref 0–0.3)
ABSOLUTE LYMPH #: 1.39 K/UL (ref 1.1–3.7)
ABSOLUTE MONO #: 0.76 K/UL (ref 0.1–1.2)
AMPHETAMINE SCREEN URINE: NEGATIVE
ANTIBODY SCREEN: NEGATIVE
ARM BAND NUMBER: NORMAL
BARBITURATE SCREEN URINE: NEGATIVE
BASOPHILS # BLD: 0 % (ref 0–2)
BASOPHILS ABSOLUTE: 0.05 K/UL (ref 0–0.2)
BENZODIAZEPINE SCREEN, URINE: NEGATIVE
CANNABINOID SCREEN URINE: NEGATIVE
COCAINE METABOLITE, URINE: NEGATIVE
EOSINOPHILS RELATIVE PERCENT: 1 % (ref 1–4)
EXPIRATION DATE: NORMAL
FENTANYL URINE: NEGATIVE
HCT VFR BLD CALC: 37.6 % (ref 36.3–47.1)
HEMOGLOBIN: 12.4 G/DL (ref 11.9–15.1)
IMMATURE GRANULOCYTES: 2 %
LYMPHOCYTES # BLD: 12 % (ref 24–43)
MCH RBC QN AUTO: 28.4 PG (ref 25.2–33.5)
MCHC RBC AUTO-ENTMCNC: 33 G/DL (ref 28.4–34.8)
MCV RBC AUTO: 86.2 FL (ref 82.6–102.9)
METHADONE SCREEN, URINE: NEGATIVE
MONOCYTES # BLD: 6 % (ref 3–12)
NRBC AUTOMATED: 0 PER 100 WBC
OPIATES, URINE: NEGATIVE
OXYCODONE SCREEN URINE: NEGATIVE
PDW BLD-RTO: 13.2 % (ref 11.8–14.4)
PHENCYCLIDINE, URINE: NEGATIVE
PLATELET # BLD: 218 K/UL (ref 138–453)
PMV BLD AUTO: 10.2 FL (ref 8.1–13.5)
RBC # BLD: 4.36 M/UL (ref 3.95–5.11)
SEG NEUTROPHILS: 79 % (ref 36–65)
SEGMENTED NEUTROPHILS ABSOLUTE COUNT: 9.59 K/UL (ref 1.5–8.1)
T. PALLIDUM, IGG: NONREACTIVE
TEST INFORMATION: NORMAL
WBC # BLD: 12.1 K/UL (ref 3.5–11.3)

## 2022-07-22 PROCEDURE — 1220000000 HC SEMI PRIVATE OB R&B

## 2022-07-22 PROCEDURE — 2580000003 HC RX 258: Performed by: STUDENT IN AN ORGANIZED HEALTH CARE EDUCATION/TRAINING PROGRAM

## 2022-07-22 PROCEDURE — 86901 BLOOD TYPING SEROLOGIC RH(D): CPT

## 2022-07-22 PROCEDURE — 6370000000 HC RX 637 (ALT 250 FOR IP): Performed by: STUDENT IN AN ORGANIZED HEALTH CARE EDUCATION/TRAINING PROGRAM

## 2022-07-22 PROCEDURE — 6370000000 HC RX 637 (ALT 250 FOR IP)

## 2022-07-22 PROCEDURE — 80307 DRUG TEST PRSMV CHEM ANLYZR: CPT

## 2022-07-22 PROCEDURE — 85025 COMPLETE CBC W/AUTO DIFF WBC: CPT

## 2022-07-22 PROCEDURE — 86780 TREPONEMA PALLIDUM: CPT

## 2022-07-22 PROCEDURE — 86900 BLOOD TYPING SEROLOGIC ABO: CPT

## 2022-07-22 PROCEDURE — 86850 RBC ANTIBODY SCREEN: CPT

## 2022-07-22 RX ORDER — SODIUM CHLORIDE, SODIUM LACTATE, POTASSIUM CHLORIDE, CALCIUM CHLORIDE 600; 310; 30; 20 MG/100ML; MG/100ML; MG/100ML; MG/100ML
INJECTION, SOLUTION INTRAVENOUS CONTINUOUS
Status: DISCONTINUED | OUTPATIENT
Start: 2022-07-22 | End: 2022-07-24

## 2022-07-22 RX ORDER — LIDOCAINE HYDROCHLORIDE 10 MG/ML
30 INJECTION, SOLUTION EPIDURAL; INFILTRATION; INTRACAUDAL; PERINEURAL PRN
Status: DISCONTINUED | OUTPATIENT
Start: 2022-07-22 | End: 2022-07-24

## 2022-07-22 RX ORDER — MISOPROSTOL 100 UG/1
800 TABLET ORAL PRN
Status: DISCONTINUED | OUTPATIENT
Start: 2022-07-22 | End: 2022-07-24

## 2022-07-22 RX ORDER — SODIUM CHLORIDE 0.9 % (FLUSH) 0.9 %
5-40 SYRINGE (ML) INJECTION EVERY 12 HOURS SCHEDULED
Status: DISCONTINUED | OUTPATIENT
Start: 2022-07-22 | End: 2022-07-24

## 2022-07-22 RX ORDER — METHYLERGONOVINE MALEATE 0.2 MG/ML
200 INJECTION INTRAVENOUS PRN
Status: DISCONTINUED | OUTPATIENT
Start: 2022-07-22 | End: 2022-07-24

## 2022-07-22 RX ORDER — SODIUM CHLORIDE, SODIUM LACTATE, POTASSIUM CHLORIDE, AND CALCIUM CHLORIDE .6; .31; .03; .02 G/100ML; G/100ML; G/100ML; G/100ML
500 INJECTION, SOLUTION INTRAVENOUS PRN
Status: DISCONTINUED | OUTPATIENT
Start: 2022-07-22 | End: 2022-07-24

## 2022-07-22 RX ORDER — SODIUM CHLORIDE 9 MG/ML
25 INJECTION, SOLUTION INTRAVENOUS PRN
Status: DISCONTINUED | OUTPATIENT
Start: 2022-07-22 | End: 2022-07-24

## 2022-07-22 RX ORDER — ACETAMINOPHEN 500 MG
1000 TABLET ORAL EVERY 6 HOURS PRN
Status: DISCONTINUED | OUTPATIENT
Start: 2022-07-22 | End: 2022-07-24

## 2022-07-22 RX ORDER — CHOLECALCIFEROL (VITAMIN D3) 125 MCG
5 CAPSULE ORAL NIGHTLY PRN
Status: DISCONTINUED | OUTPATIENT
Start: 2022-07-22 | End: 2022-07-24

## 2022-07-22 RX ORDER — SODIUM CHLORIDE, SODIUM LACTATE, POTASSIUM CHLORIDE, AND CALCIUM CHLORIDE .6; .31; .03; .02 G/100ML; G/100ML; G/100ML; G/100ML
1000 INJECTION, SOLUTION INTRAVENOUS PRN
Status: DISCONTINUED | OUTPATIENT
Start: 2022-07-22 | End: 2022-07-24

## 2022-07-22 RX ORDER — ONDANSETRON 2 MG/ML
4 INJECTION INTRAMUSCULAR; INTRAVENOUS EVERY 6 HOURS PRN
Status: DISCONTINUED | OUTPATIENT
Start: 2022-07-22 | End: 2022-07-23 | Stop reason: SDUPTHER

## 2022-07-22 RX ORDER — SODIUM CHLORIDE 0.9 % (FLUSH) 0.9 %
5-40 SYRINGE (ML) INJECTION PRN
Status: DISCONTINUED | OUTPATIENT
Start: 2022-07-22 | End: 2022-07-24

## 2022-07-22 RX ORDER — CARBOPROST TROMETHAMINE 250 UG/ML
250 INJECTION, SOLUTION INTRAMUSCULAR PRN
Status: DISCONTINUED | OUTPATIENT
Start: 2022-07-22 | End: 2022-07-24

## 2022-07-22 RX ORDER — TRANEXAMIC ACID 10 MG/ML
1000 INJECTION, SOLUTION INTRAVENOUS
Status: ACTIVE | OUTPATIENT
Start: 2022-07-22 | End: 2022-07-22

## 2022-07-22 RX ORDER — DIPHENHYDRAMINE HCL 25 MG
25 TABLET ORAL EVERY 4 HOURS PRN
Status: DISCONTINUED | OUTPATIENT
Start: 2022-07-22 | End: 2022-07-24

## 2022-07-22 RX ADMIN — SODIUM CHLORIDE, POTASSIUM CHLORIDE, SODIUM LACTATE AND CALCIUM CHLORIDE: 600; 310; 30; 20 INJECTION, SOLUTION INTRAVENOUS at 17:47

## 2022-07-22 RX ADMIN — Medication 5 MG: at 22:26

## 2022-07-22 RX ADMIN — Medication 25 MCG: at 18:12

## 2022-07-22 RX ADMIN — Medication 25 MCG: at 22:26

## 2022-07-22 NOTE — H&P
OBSTETRICAL HISTORY formerly [facility]    Date: 2022       Time: 4:34 PM   Patient Name: Joni Roland     Patient : 1990  Room/Bed: 0706/0706-01    Admission Date/Time: 2022  3:43 PM      CC: Risk reducing IOL     HPI: Joni Roland is a 33 y.o. Donneta Canela at 39w0d who presents for a risk reducing induction of labor. The patient reports fetal movement is present, denies contractions, denies loss of fluid, denies vaginal bleeding. Patient denies headache, vision changes, nausea, vomiting, fever, chills, shortness of breath, chest pain, RUQ pain, abdominal pain, diarrhea, change in color/amount/odor of vaginal discharge, dysuria or, hematuria. DATING:  LMP: Patient's last menstrual period was 10/09/2021.   Estimated Date of Delivery: 22   Based on: LMP c/w early ultrasound, at 7 6/7 weeks GA    PREGNANCY RISK FACTORS:  Patient Active Problem List   Diagnosis    Transverse fetal lie (RSLVD)    Encounter for supervision of normal first pregnancy in third trimester    High-risk pregnancy, unspecified trimester        Steroids Given In This Pregnancy:  no     REVIEW OF SYSTEMS:   Constitutional: negative fever, negative chills, negative weight changes   HEENT: negative visual disturbances, negative headaches, negative dizziness, negative hearing loss  Breast: Negative breast abnormalities, negative breast lumps, negative nipple discharge  Respiratory: negative dyspnea, negative cough, negative SOB  Cardiovascular: negative chest pain,  negative palpitations, negative arrhythmia, negative syncope   Gastrointestinal: negative abdominal pain, negative RUQ pain, negative N/V, negative diarrhea, negative constipation, negative bowel changes, negative heartburn   Genitourinary: negative dysuria, negative hematuria, negative urinary incontinence, negative vaginal discharge, negative vaginal bleeding or spotting  Dermatological: negative rash, negative pruritis, negative mole or other skin changes  Hematologic: negative bruising  Immunologic/Lymphatic: negative recent illness, negative recent sick contact  Musculoskeletal: negative back pain, negative myalgias, negative arthralgias  Neurological:  negative dizziness, negative migraines, negative seizures, negative weakness  Behavior/Psych: negative depression, negative anxiety, negative SI, negative HI    OBSTETRIC HISTORY:   OB History    Para Term  AB Living   1 0 0 0 0 0   SAB IAB Ectopic Molar Multiple Live Births   0 0 0 0 0 0      # Outcome Date GA Lbr José Miguel/2nd Weight Sex Delivery Anes PTL Lv   1 Current                PAST MEDICAL HISTORY:   has a past medical history of Asthma, Seasonal allergies, and Stress incontinence. PAST SURGICAL HISTORY:   has a past surgical history that includes [place] tooth extraction. ALLERGIES:  has No Known Allergies. MEDICATIONS:  Prior to Admission medications    Medication Sig Start Date End Date Taking? Authorizing Provider   omeprazole (PRILOSEC) 20 MG delayed release capsule Take 1 capsule by mouth every morning (before breakfast) 6/15/22   LENNY Woo - CNP   Prenatal Vit-Fe Fumarate-FA (PRENATAL PO) Take by mouth    Historical Provider, MD       FAMILY HISTORY:  family history includes Alzheimer's Disease in her maternal grandmother; Heart Attack (age of onset: 43) in her maternal grandfather; High Cholesterol in her father and mother; Hypertension (age of onset: 39) in her father; Hypotension in her paternal grandfather; No Known Problems in her brother and paternal grandmother. SOCIAL HISTORY:   reports that she has never smoked. She has never used smokeless tobacco. She reports that she does not currently use alcohol. She reports that she does not use drugs.     VITALS:  Vitals:    22 1604 22 1607 22 1812 22 2032   BP: 133/81 133/89 126/88 (!) 143/89   Pulse:   (!) 101 (!) 107   Resp:   18 18   Temp: 98.7 °F (37.1 °C) 98 °F (36.7 °C)   TempSrc:    Oral   Weight: 215 lb (97.5 kg)      Height: 5' 4\" (1.626 m)            PHYSICAL EXAM:  Fetal Heart Monitor:  Baseline Heart Rate 145, moderate variability, absent accelerations, absent decelerations  [place]: contractions, none    General appearance:  no apparent distress, alert and cooperative  HEENT: head atraumatic, normocephalic, moist mucous membranes, trachea midline  Neurologic:  alert, oriented, normal speech, no focal findings or movement disorder noted  Lungs:  No increased work of breathing, good air exchange, clear to auscultation bilaterally, no crackles or wheezing  Heart:  regular rate and rhythm and no murmur, rubs, gallops  Abdomen:  soft, gravid, non-tender, no rebound, guarding, or rigidity, no RUQ or epigastric tenderness, no signs or symptoms of abruption, no signs or symptoms of chorioamnionitis  Extremities:  no calf tenderness, non edematous, no varicosities, full range of motion in all four extremities   Musculoskeletal: Gross strength equal and intact throughout, no gross abnormalities, range of motion normal in hips, knees, shoulders and spine, CVA tenderness: none  Psychiatric: Mood appropriate, normal affect   Rectal Exam: not indicated  Pelvic Exam:   Chaperone for Intimate Exam: Chaperone was present for entire exam, Chaperone Name: Clary Law RN    Sterile Vaginal Exam:  Cervix: No cervical motion tenderness   Uterus: Is gravid, Normal size, shape, consistency and non-tender   Adnexa: Non-tender, no palpable masses  Cervix: fingertip dilated, 0 % effaced, -3 station, posterior position (out of 3 station), medium consistency, FETAL POSITION: Cephalic (confirmed by ultrasound), Membranes intact,    Bishops Score: 3     0 1 2 3   Position Posterior Intermediate Anterior -   Consistency Firm Intermediate Soft -   Effacement 0-30% 31-50% 51-80% >80%   Dilation 0cm 1-2cm 3-4cm >5cm   Fetal Station -3 -2 -1, 0 +1, +2       LIMITED BEDSIDE US:  Position: Cephalic  Placental Location: anterior  Fetal Heart Tones: Present  Fetal Movement: Present   Amniotic Fluid Index/Volume:  adequate 2x2 cm fluid pocket  Estimated Fetal Weight:  9 lbs 2oz    PRENATAL LAB RESULTS:   Blood Type/Rh: O pos  Antibody Screen: negative  Hemoglobin, Hematocrit, Platelets: 32.2/18.2/536  Rubella: immune  T. Pallidum, IgG: non-reactive  Hepatitis B Surface Antigen: non-reactive   Hepatitis C Antibody: non-reactive   HIV: non-reactive   Gonorrhea: negative  Chlamydia: negative  Urine culture: negative, date: 21    Early 1 hour Glucose Tolerance Test:  120    1 hour Glucose Tolerance Test:  111    Group B Strep: negative on 22  Cystic Fibrosis Screen: negative  Sickle Cell Screen: not done  First Trimester Screen: not done  QUAD: not done  MSAFP: na  Non-Invasive Prenatal Testing: not done  Anatomy US: Anterior placenta, 3VC, male gender, normal anatomy    ASSESSMENT & PLAN:  Deariris Schreiber is a 33 y.o. female  at 39w0d    - GBS negative / Rh positive / R immune   - No indication for GBS prophylaxis   - VSS, afebrile     Bobbi Snipe Dr. Pasquale Underwood   - CBC, TPall, T&S   - UA w/ micro, Urine cx   - UDS R/B/A discussed, consent obtained and in chart   - Cat 1 FHT, TOCO q none   - SVE: fingertip/thick/high (out of 3 station)   - BSUS: cephalic, anterior placenta, EFW 9#2   - Mode of induction: cytotec 25 mcg PO     Transverse Fetal Lie (RSLVED)   - seen on MFM scan in March   - Cephalic on BSUS today     Childhood Asthma    - Denies any current s/s     BMI 36    Patient Active Problem List    Diagnosis Date Noted    High-risk pregnancy, unspecified trimester 2022     Priority: Medium    Encounter for supervision of normal first pregnancy in third trimester 2022     Priority: Medium    Transverse fetal lie (RSLVD) 2022       Plan discussed with Dr. Pasquale Underwood, who is agreeable.      Steroids given this admission: No    Risks, benefits, alternatives and possible complications have been discussed in detail with the patient. Admission, and post admission procedures and expectations were discussed in detail. All questions were answered.     Attending's Name: Dr. Teena Ventura DO  Ob/Gyn Resident  7/22/2022, 4:34 PM

## 2022-07-22 NOTE — DISCHARGE SUMMARY
Obstetric Discharge Summary  [facility]    Patient Name: Jenni Ron  Patient : 1990  Primary Care Physician: Abby Nguyen MD  Admit Date: 2022    Principal Diagnosis: IUP at 39w0d, admitted for RR IOL     Her pregnancy has been complicated by:   Patient Active Problem List   Diagnosis    Encounter for supervision of normal first pregnancy in third trimester    High-risk pregnancy, unspecified trimester    Arrest of dilation, delivered, current hospitalization    PLTCS 22 M Apg 8/9 Wt 9#0    Postoperative state    BMI 36.0-36.9,adult    Intraoperative hemorrhage    Childhood Asthma       Infection Present?: No  Hospital Acquired: N/A    Surgical Operations & Procedures:  Analgesia: epidural  Delivery Type:  Delivery: : Low Cervical, Transverse   Laceration(s): Absent    Consultations: NICU and Anesthesia    Pertinent Findings & Procedures:   Jenni Ron is a 33 y.o. female  at 39w0d admitted for a risk reducing induction of labor; received Cytotec 25 mcg PO x 5, Stephens transcervical balloon, morphine/compazine x1, pitocin, AROM (clr), epidural. Discussion was had with patient and decision was made to proceed with  section due to failed induction. Patient received TXA x1, Ancef. And Azithromycin preoperatively. She delivered by primary low transverse  a Live Born infant on 22. Information for the patient's :  Susannah Longoria [[id number]]   male   Birth Weight: 9 lb 0.6 oz (4.1 kg)     Apgars: 8 at 1 minute and 9 at 5 minutes. Postpartum course: normal.  Patient was noted to have a postpartum hemorrhage wtih QBL of 1092. Denies s/s of anemia. POD #1: Hgb 7.7, iron supplementation sent on discharge. Patient asymptomatic and declines blood transfusion.      Course of patient: uncomplicated    Discharge to: Home    Readmission planned: no     Recommendations on Discharge:     Medications:      Medication List        START taking these medications      acetaminophen 500 MG tablet  Commonly known as: TYLENOL  Take 2 tablets by mouth in the morning and 2 tablets at noon and 2 tablets before bedtime. docusate sodium 100 MG capsule  Commonly known as: COLACE  Take 1 capsule by mouth in the morning and 1 capsule before bedtime. ferrous sulfate 325 (65 Fe) MG EC tablet  Commonly known as: Fe Tabs  Take 1 tablet by mouth in the morning and 1 tablet before bedtime. ibuprofen 600 MG tablet  Commonly known as: ADVIL;MOTRIN  Take 1 tablet by mouth every 6 hours as needed for Pain     oxyCODONE 5 MG immediate release tablet  Commonly known as: Roxicodone  Take 1 tablet by mouth every 6 hours as needed for Pain for up to 5 days. Intended supply: 3 days. Take lowest dose possible to manage pain            CONTINUE taking these medications      omeprazole 20 MG delayed release capsule  Commonly known as: PRILOSEC  Take 1 capsule by mouth every morning (before breakfast)     PRENATAL PO               Where to Get Your Medications        These medications were sent to [address]  [place]  [id number]      Phone: [phone]   acetaminophen 500 MG tablet  docusate sodium 100 MG capsule  ferrous sulfate 325 (65 Fe) MG EC tablet  ibuprofen 600 MG tablet  oxyCODONE 5 MG immediate release tablet         Activity: pelvic rest x 6 weeks, no driving on narcotics, no lifting greater than 15 lbs  Diet: regular diet  Follow up: 1 week for Silver dressing removal and post op appt    Condition on discharge: stable    Discharge date: 07/26/22    Sravanthi Nagy DO  Ob/Gyn Resident    Comments:  Home care and follow-up care were reviewed. Pelvic rest, and birth control were reviewed. Signs and symptoms of mastitis and post partum depression were reviewed. The patient is to notify her physician if any of these occur.  The patient was counseled on secondary smoke risks and the increased risk of sudden infant death syndrome and respiratory problems to her baby with exposure. She was counseled on various alternate recommendations to decrease the exposure to secondary smoke to her children.

## 2022-07-23 ENCOUNTER — ANESTHESIA EVENT (OUTPATIENT)
Dept: LABOR AND DELIVERY | Age: 32
End: 2022-07-23
Payer: COMMERCIAL

## 2022-07-23 ENCOUNTER — ANESTHESIA (OUTPATIENT)
Dept: LABOR AND DELIVERY | Age: 32
End: 2022-07-23
Payer: COMMERCIAL

## 2022-07-23 PROCEDURE — 6370000000 HC RX 637 (ALT 250 FOR IP): Performed by: STUDENT IN AN ORGANIZED HEALTH CARE EDUCATION/TRAINING PROGRAM

## 2022-07-23 PROCEDURE — 6360000002 HC RX W HCPCS: Performed by: SPECIALIST

## 2022-07-23 PROCEDURE — 1220000000 HC SEMI PRIVATE OB R&B

## 2022-07-23 PROCEDURE — 6360000002 HC RX W HCPCS: Performed by: STUDENT IN AN ORGANIZED HEALTH CARE EDUCATION/TRAINING PROGRAM

## 2022-07-23 PROCEDURE — 6360000002 HC RX W HCPCS: Performed by: ANESTHESIOLOGY

## 2022-07-23 PROCEDURE — 62325 NJX INTERLAMINAR CRV/THRC: CPT | Performed by: ANESTHESIOLOGY

## 2022-07-23 PROCEDURE — 96372 THER/PROPH/DIAG INJ SC/IM: CPT

## 2022-07-23 PROCEDURE — 2500000003 HC RX 250 WO HCPCS: Performed by: SPECIALIST

## 2022-07-23 PROCEDURE — 2580000003 HC RX 258: Performed by: STUDENT IN AN ORGANIZED HEALTH CARE EDUCATION/TRAINING PROGRAM

## 2022-07-23 PROCEDURE — 6360000002 HC RX W HCPCS

## 2022-07-23 PROCEDURE — 96374 THER/PROPH/DIAG INJ IV PUSH: CPT

## 2022-07-23 PROCEDURE — 6370000000 HC RX 637 (ALT 250 FOR IP)

## 2022-07-23 RX ORDER — NALBUPHINE HCL 10 MG/ML
5 AMPUL (ML) INJECTION EVERY 4 HOURS PRN
Status: DISCONTINUED | OUTPATIENT
Start: 2022-07-23 | End: 2022-07-24

## 2022-07-23 RX ORDER — PROCHLORPERAZINE EDISYLATE 5 MG/ML
10 INJECTION INTRAMUSCULAR; INTRAVENOUS ONCE
Status: COMPLETED | OUTPATIENT
Start: 2022-07-23 | End: 2022-07-23

## 2022-07-23 RX ORDER — LIDOCAINE HYDROCHLORIDE AND EPINEPHRINE 15; 5 MG/ML; UG/ML
INJECTION, SOLUTION EPIDURAL PRN
Status: DISCONTINUED | OUTPATIENT
Start: 2022-07-23 | End: 2022-07-24 | Stop reason: SDUPTHER

## 2022-07-23 RX ORDER — ROPIVACAINE HYDROCHLORIDE 2 MG/ML
INJECTION, SOLUTION EPIDURAL; INFILTRATION; PERINEURAL PRN
Status: DISCONTINUED | OUTPATIENT
Start: 2022-07-23 | End: 2022-07-24 | Stop reason: SDUPTHER

## 2022-07-23 RX ORDER — EPHEDRINE SULFATE 50 MG/ML
10 INJECTION INTRAVENOUS
Status: DISCONTINUED | OUTPATIENT
Start: 2022-07-23 | End: 2022-07-24

## 2022-07-23 RX ORDER — NALOXONE HYDROCHLORIDE 0.4 MG/ML
INJECTION, SOLUTION INTRAMUSCULAR; INTRAVENOUS; SUBCUTANEOUS PRN
Status: DISCONTINUED | OUTPATIENT
Start: 2022-07-23 | End: 2022-07-24

## 2022-07-23 RX ORDER — MORPHINE SULFATE 10 MG/ML
10 INJECTION, SOLUTION INTRAMUSCULAR; INTRAVENOUS ONCE
Status: COMPLETED | OUTPATIENT
Start: 2022-07-23 | End: 2022-07-23

## 2022-07-23 RX ORDER — ONDANSETRON 2 MG/ML
4 INJECTION INTRAMUSCULAR; INTRAVENOUS EVERY 6 HOURS PRN
Status: DISCONTINUED | OUTPATIENT
Start: 2022-07-23 | End: 2022-07-24

## 2022-07-23 RX ORDER — ROPIVACAINE HYDROCHLORIDE 2 MG/ML
INJECTION, SOLUTION EPIDURAL; INFILTRATION; PERINEURAL
Status: COMPLETED
Start: 2022-07-23 | End: 2022-07-23

## 2022-07-23 RX ADMIN — Medication 25 MCG: at 02:34

## 2022-07-23 RX ADMIN — Medication 25 MCG: at 11:42

## 2022-07-23 RX ADMIN — Medication 25 MCG: at 06:42

## 2022-07-23 RX ADMIN — MORPHINE SULFATE 10 MG: 10 INJECTION INTRAVENOUS at 12:20

## 2022-07-23 RX ADMIN — ROPIVACAINE HYDROCHLORIDE 10 ML/HR: 2 INJECTION, SOLUTION EPIDURAL; INFILTRATION at 22:14

## 2022-07-23 RX ADMIN — Medication 1 MILLI-UNITS/MIN: at 17:30

## 2022-07-23 RX ADMIN — SODIUM CHLORIDE, POTASSIUM CHLORIDE, SODIUM LACTATE AND CALCIUM CHLORIDE: 600; 310; 30; 20 INJECTION, SOLUTION INTRAVENOUS at 15:49

## 2022-07-23 RX ADMIN — SODIUM CHLORIDE, POTASSIUM CHLORIDE, SODIUM LACTATE AND CALCIUM CHLORIDE: 600; 310; 30; 20 INJECTION, SOLUTION INTRAVENOUS at 01:50

## 2022-07-23 RX ADMIN — SODIUM CHLORIDE, POTASSIUM CHLORIDE, SODIUM LACTATE AND CALCIUM CHLORIDE: 600; 310; 30; 20 INJECTION, SOLUTION INTRAVENOUS at 22:43

## 2022-07-23 RX ADMIN — PROCHLORPERAZINE EDISYLATE 10 MG: 5 INJECTION INTRAMUSCULAR; INTRAVENOUS at 12:20

## 2022-07-23 RX ADMIN — ROPIVACAINE HYDROCHLORIDE 10 ML: 2 INJECTION, SOLUTION EPIDURAL; INFILTRATION at 22:09

## 2022-07-23 RX ADMIN — ONDANSETRON 4 MG: 2 INJECTION INTRAMUSCULAR; INTRAVENOUS at 23:28

## 2022-07-23 RX ADMIN — LIDOCAINE HYDROCHLORIDE,EPINEPHRINE BITARTRATE 3 ML: 15; .005 INJECTION, SOLUTION EPIDURAL; INFILTRATION; INTRACAUDAL; PERINEURAL at 22:04

## 2022-07-23 ASSESSMENT — PAIN SCALES - GENERAL: PAINLEVEL_OUTOF10: 5

## 2022-07-23 NOTE — PROGRESS NOTES
Labor Progress Note    Ángela Hadley is a 33 y.o. female  at 36w3d  The patient was seen and examined. Her pain is well controlled without medication. She reports fetal movement is present, complains of contractions, denies loss of fluid, denies vaginal bleeding.        Vital Signs:  Vitals:    22 0655 22 0741 22 1226 22 1550   BP:  124/86 116/68 115/72   Pulse:  87 75 84   Resp:  18 16 16   Temp:  97.9 °F (36.6 °C)  98.2 °F (36.8 °C)   TempSrc: Oral Oral  Oral   Weight:       Height:             FHT: Baseline 140, moderate variability, accelerations present, decelerations absent, Category 1 tracing  Contractions: irregular, every 2-6 minutes  Cervical Exam: 4 cm dilated, 50 effaced, -2 station  Chaperone for Intimate Exam: Chaperone was present for entire exam, Chaperone Name: Rigo Girard RN  Pitocin: @ 0 mu/min    Membranes: Intact  Scalp Electrode in place: absent  Intrauterine Pressure Catheter in Place: absent          Assessment/Plan:  Ángela Hadley is a 33 y.o. female  at 39w1d admitted for IUP   - GBS negative, No indication for GBS prophylaxis   - Rh pos, Workup for Rhogam not indicated PP   - Anterior placenta, 3 vc, normal anatomy    Risk Reducing IOL    - VSS, afebrile   - Cat 1 FHT and TOCO showing irregular contractions    - Membranes intact   - SVE 4/50%/-2   - cEFM and TOCO   - LR IVF @ 125 mL/hr   - S/p Cytotec 25 mcg PO x5   - S/p morphine/compazine x1   - S/p Stephens transcervical balloon    - Pitocin ordered per protocol      Attending updated and in agreement with plan    Reji Yee DO  Ob/Gyn Resident  Pager: [phone] [facility]   [id number]:[id number] PM

## 2022-07-23 NOTE — CARE COORDINATION
ANTEPARTUM NOTE    High-risk pregnancy, unspecified trimester [O09.90]    Reinaanuradha Omalley was admitted to L&D on 7/22/2022 for RR IOL @ 44 0/7    OB GYN Provider: Torres Funez    Will meet with patient after delivery to verify name/address/phone/insurance and discuss discharge planning. Anticipate DC home 2 nights after vaginal delivery or 4 nights after C/S delivery as long as hemodynamically stable.

## 2022-07-23 NOTE — PROGRESS NOTES
Labor Progress Note    Sosa Moreira is a 33 y.o. female  at 36w3d  The patient was seen and examined. Her pain is well controlled without medications. She reports fetal movement is present, complains of contractions, denies loss of fluid, denies vaginal bleeding. Stephens balloon placement discussed with patient, risks and benefits discussed and patient was agreeable. Stephens balloon placed without stylette and inflated. Gentle traction on the balloon confirmed intrauterine placement. Mother and baby tolerated well. Vital Signs:  Vitals:    22 0236 22 0644 22 0655 22 0741   BP: 112/66 122/78  124/86   Pulse: 88 85  87   Resp:    18   Temp: 98.2 °F (36.8 °C) 98.3 °F (36.8 °C)  97.9 °F (36.6 °C)   TempSrc: Oral Oral Oral Oral   Weight:       Height:             FHT: Baseline 150, moderate variability, accelerations present, decelerations absent, Category 1 tracing  Contractions: irregular q2-8min  Cervical Exam: 1 cm dilated, 50% effaced, -1 station  Chaperone for Intimate Exam: Chaperone was present for entire exam, Chaperone Name: Todd Harrison RN  Pitocin: @ 0 mu/min    Membranes: Intact  Scalp Electrode in place: absent  Intrauterine Pressure Catheter in Place: absent        Assessment/Plan:  Sosa Moreira is a 33 y.o. female  at 39w1d admitted for IUP   - GBS negative, No indication for GBS prophylaxis   - Rh pos, Workup for Rhogam not indicated PP   - Anterior placenta, 3 vc, normal anatomy    Risk Reducing IOL    - VSS, afebrile    - Cat 1 FHT and TOCO showing irregular contractions   - Membranes intact   - SVE 1/50%/-1   - cEFM and TOCO   - LR IVF @ 125 mL/hr   - S/p Cytotec 25 mcg PO x4    - Cytotec 25 mcg PO ordered    - Stephens balloon placed    - Morphine/compazine x1 ordered     Discussed with Senior Resident.     Attending updated and in agreement with plan    Franck Waddell DO  Ob/Gyn Resident  Pager: [phone] [facility] 7/23/202212:13 PM

## 2022-07-23 NOTE — PROGRESS NOTES
Labor Progress Note    Xiomara Alvarez is a 33 y.o. female  at 36w3d  The patient was seen and examined. Her pain is well controlled. She reports fetal movement is present, complains of contractions, denies loss of fluid, denies vaginal bleeding. Vital Signs:  Vitals:    22 1607 22 1812 22 2032 22 0236   BP: 133/89 126/88 (!) 143/89 112/66   Pulse:  (!) 101 (!) 107 88   Resp:  18 18    Temp:   98 °F (36.7 °C) 98.2 °F (36.8 °C)   TempSrc:   Oral Oral   Weight:       Height:             FHT:  135 , moderate variability, accelerations present, decelerations absent  Contractions: irregular contractions    Chaperone for Intimate Exam: Chaperone was present for entire exam, Chaperone Name: Jimbo Moody RN  Cervical Exam: Fingertip dilated, thick, -3 station  Pitocin: @ 0 mu/min    Membranes: Intact  Scalp Electrode in place: absent  Intrauterine Pressure Catheter in Place: absent    Interventions: none    Assessment/Plan:  Xiomara Alvarez is a 33 y.o. female  at 39w1d admitted for RR IOL   - GBS negative, No indication for GBS prophylaxis   - VSS, Afebrile   - cEFM/TOCO cat 1 tracing - irregular contractions   - S/p Cytotec 25 PO x3, next dose ordered   - Pain well controlled at this time   - Will continue to monitor   - Due to EFW of 9#2, patient counseled on risk of shoulder dystocia. Advised of risk of fetal bruising and fracture, maternal vaginal laceration, potential for permanent brachial plexus injury, and fetal hypoxia that, in rare cases, can lead to permanent neurologic injury or death. Patient verbalizes understanding and wishes to proceed with vaginal delivery. Attending in agreement with plan Dr. Lexii Loja MD  Ob/Gyn Resident  2022, 6:22 AM    Resident Physician Statement  I have personally seen the patient. I agree with the assessment, plan and orders as documented by erendira resident. I have made changes to the above note as needed.  I have discussed the case with above named attending.      Hira Hall, DO  OB/GYN PGY-3  7/23/2022  6:30 AM

## 2022-07-23 NOTE — PROGRESS NOTES
OB/GYN Resident Interval Note    FHT reviewed. Cat 1 tracing, uterine irritability. VSS. Continue to monitor closely.     Sara Gilbert, DO  OB/GYN Resident

## 2022-07-23 NOTE — PROGRESS NOTES
Labor Progress Note    Hiawatha Galeazzi is a 33 y.o. female  at 36w3d  The patient was seen and examined. Her pain is well controlled. She reports fetal movement is present, complains of contractions, denies loss of fluid, denies vaginal bleeding.        Vital Signs:  Vitals:    22 1550 22 1700 22 1800 22 1913   BP: 115/72 129/74 127/77 114/68   Pulse: 84 95 98    Resp: 16 16 16 16   Temp: 98.2 °F (36.8 °C)      TempSrc: Oral      Weight:       Height:         FHT:  135 , moderate variability, accelerations present, decelerations absent   Contractions: irregular, every 2-6 minutes    Chaperone for Intimate Exam: NA  Cervical Exam: deferred  Pitocin: @ 6 mu/min    Membranes: Intact  Scalp Electrode in place: absent  Intrauterine Pressure Catheter in Place: absent    Interventions: none    Assessment/Plan:  Hiawatha Galeazzi is a 33 y.o. female  at 39w1d admitted for risk reducing induction of labor   - GBS negative, No indication for GBS prophylaxis   - Afebrile  - S/p Stephens   - S/p Cytotec 25 mcg PO x5   - Continue pitocin per protocol  - S/p Morphine/Compazine x1   - Plan for AROM when able     Elv BP x1   - Denies s/s PreE   - Continue to monitor closely     Attending updated and in agreement with plan    Maris Tavarez MD  Ob/Gyn Resident  2022, 7:57 PM

## 2022-07-24 PROCEDURE — 6360000002 HC RX W HCPCS

## 2022-07-24 PROCEDURE — 1220000000 HC SEMI PRIVATE OB R&B

## 2022-07-24 PROCEDURE — 2580000003 HC RX 258: Performed by: STUDENT IN AN ORGANIZED HEALTH CARE EDUCATION/TRAINING PROGRAM

## 2022-07-24 PROCEDURE — 2580000003 HC RX 258

## 2022-07-24 PROCEDURE — 7100000000 HC PACU RECOVERY - FIRST 15 MIN: Performed by: OBSTETRICS & GYNECOLOGY

## 2022-07-24 PROCEDURE — 6360000002 HC RX W HCPCS: Performed by: NURSE ANESTHETIST, CERTIFIED REGISTERED

## 2022-07-24 PROCEDURE — 6360000002 HC RX W HCPCS: Performed by: STUDENT IN AN ORGANIZED HEALTH CARE EDUCATION/TRAINING PROGRAM

## 2022-07-24 PROCEDURE — 6370000000 HC RX 637 (ALT 250 FOR IP)

## 2022-07-24 PROCEDURE — 59510 CESAREAN DELIVERY: CPT | Performed by: OBSTETRICS & GYNECOLOGY

## 2022-07-24 PROCEDURE — 96374 THER/PROPH/DIAG INJ IV PUSH: CPT

## 2022-07-24 PROCEDURE — 10907ZC DRAINAGE OF AMNIOTIC FLUID, THERAPEUTIC FROM PRODUCTS OF CONCEPTION, VIA NATURAL OR ARTIFICIAL OPENING: ICD-10-PCS | Performed by: OBSTETRICS & GYNECOLOGY

## 2022-07-24 PROCEDURE — 59050 FETAL MONITOR W/REPORT: CPT

## 2022-07-24 PROCEDURE — 2709999900 HC NON-CHARGEABLE SUPPLY: Performed by: OBSTETRICS & GYNECOLOGY

## 2022-07-24 PROCEDURE — 6370000000 HC RX 637 (ALT 250 FOR IP): Performed by: STUDENT IN AN ORGANIZED HEALTH CARE EDUCATION/TRAINING PROGRAM

## 2022-07-24 PROCEDURE — 51701 INSERT BLADDER CATHETER: CPT

## 2022-07-24 PROCEDURE — 3700000001 HC ADD 15 MINUTES (ANESTHESIA): Performed by: OBSTETRICS & GYNECOLOGY

## 2022-07-24 PROCEDURE — 2500000003 HC RX 250 WO HCPCS: Performed by: STUDENT IN AN ORGANIZED HEALTH CARE EDUCATION/TRAINING PROGRAM

## 2022-07-24 PROCEDURE — 2580000003 HC RX 258: Performed by: NURSE ANESTHETIST, CERTIFIED REGISTERED

## 2022-07-24 PROCEDURE — 3E0P7VZ INTRODUCTION OF HORMONE INTO FEMALE REPRODUCTIVE, VIA NATURAL OR ARTIFICIAL OPENING: ICD-10-PCS | Performed by: OBSTETRICS & GYNECOLOGY

## 2022-07-24 PROCEDURE — 6360000002 HC RX W HCPCS: Performed by: ANESTHESIOLOGY

## 2022-07-24 PROCEDURE — 7100000001 HC PACU RECOVERY - ADDTL 15 MIN: Performed by: OBSTETRICS & GYNECOLOGY

## 2022-07-24 PROCEDURE — 2500000003 HC RX 250 WO HCPCS: Performed by: NURSE ANESTHETIST, CERTIFIED REGISTERED

## 2022-07-24 PROCEDURE — 3609079900 HC CESAREAN SECTION: Performed by: OBSTETRICS & GYNECOLOGY

## 2022-07-24 PROCEDURE — 3700000000 HC ANESTHESIA ATTENDED CARE: Performed by: OBSTETRICS & GYNECOLOGY

## 2022-07-24 RX ORDER — ACETAMINOPHEN 500 MG
1000 TABLET ORAL 3 TIMES DAILY
Qty: 30 TABLET | Refills: 1 | Status: SHIPPED | OUTPATIENT
Start: 2022-07-24 | End: 2022-08-31

## 2022-07-24 RX ORDER — VITAMIN A, ASCORBIC ACID, CHOLECALCIFEROL, .ALPHA.-TOCOPHEROL ACETATE, DL-, THIAMINE MONONITRATE, RIBOFLAVIN, NIACINAMIDE, PYRIDOXINE HYDROCHLORIDE, FOLIC ACID, CYANOCOBALAMIN, CALCIUM CARBONATE, IRON, ZINC OXIDE, AND CUPRIC OXIDE 4000; 120; 400; 22; 1.84; 3; 20; 10; 1; 12; 200; 29; 25; 2 [IU]/1; MG/1; [IU]/1; [IU]/1; MG/1; MG/1; MG/1; MG/1; MG/1; UG/1; MG/1; MG/1; MG/1; MG/1
1 TABLET ORAL DAILY
Status: DISCONTINUED | OUTPATIENT
Start: 2022-07-24 | End: 2022-07-26 | Stop reason: HOSPADM

## 2022-07-24 RX ORDER — OXYCODONE HYDROCHLORIDE 5 MG/1
5 TABLET ORAL EVERY 6 HOURS PRN
Qty: 18 TABLET | Refills: 0 | Status: SHIPPED | OUTPATIENT
Start: 2022-07-24 | End: 2022-07-29

## 2022-07-24 RX ORDER — ACETAMINOPHEN 500 MG
1000 TABLET ORAL ONCE
Status: COMPLETED | OUTPATIENT
Start: 2022-07-24 | End: 2022-07-24

## 2022-07-24 RX ORDER — KETOROLAC TROMETHAMINE 30 MG/ML
30 INJECTION, SOLUTION INTRAMUSCULAR; INTRAVENOUS EVERY 6 HOURS
Status: COMPLETED | OUTPATIENT
Start: 2022-07-24 | End: 2022-07-25

## 2022-07-24 RX ORDER — SODIUM CHLORIDE 0.9 % (FLUSH) 0.9 %
5-40 SYRINGE (ML) INJECTION EVERY 12 HOURS SCHEDULED
Status: DISCONTINUED | OUTPATIENT
Start: 2022-07-24 | End: 2022-07-26 | Stop reason: HOSPADM

## 2022-07-24 RX ORDER — FAMOTIDINE 10 MG/ML
INJECTION, SOLUTION INTRAVENOUS PRN
Status: DISCONTINUED | OUTPATIENT
Start: 2022-07-24 | End: 2022-07-24 | Stop reason: SDUPTHER

## 2022-07-24 RX ORDER — OXYCODONE HYDROCHLORIDE 5 MG/1
10 TABLET ORAL EVERY 4 HOURS PRN
Status: DISCONTINUED | OUTPATIENT
Start: 2022-07-24 | End: 2022-07-26 | Stop reason: HOSPADM

## 2022-07-24 RX ORDER — SODIUM CHLORIDE, SODIUM LACTATE, POTASSIUM CHLORIDE, CALCIUM CHLORIDE 600; 310; 30; 20 MG/100ML; MG/100ML; MG/100ML; MG/100ML
INJECTION, SOLUTION INTRAVENOUS CONTINUOUS
Status: DISCONTINUED | OUTPATIENT
Start: 2022-07-24 | End: 2022-07-24

## 2022-07-24 RX ORDER — IBUPROFEN 600 MG/1
600 TABLET ORAL EVERY 6 HOURS
Status: DISCONTINUED | OUTPATIENT
Start: 2022-07-25 | End: 2022-07-26 | Stop reason: HOSPADM

## 2022-07-24 RX ORDER — NALOXONE HYDROCHLORIDE 0.4 MG/ML
0.4 INJECTION, SOLUTION INTRAMUSCULAR; INTRAVENOUS; SUBCUTANEOUS PRN
Status: DISCONTINUED | OUTPATIENT
Start: 2022-07-24 | End: 2022-07-26 | Stop reason: HOSPADM

## 2022-07-24 RX ORDER — CHLOROPROCAINE HYDROCHLORIDE 30 MG/ML
INJECTION, SOLUTION EPIDURAL; INFILTRATION; INTRACAUDAL; PERINEURAL PRN
Status: DISCONTINUED | OUTPATIENT
Start: 2022-07-24 | End: 2022-07-24 | Stop reason: SDUPTHER

## 2022-07-24 RX ORDER — DOCUSATE SODIUM 100 MG/1
100 CAPSULE, LIQUID FILLED ORAL 2 TIMES DAILY
Status: DISCONTINUED | OUTPATIENT
Start: 2022-07-24 | End: 2022-07-26 | Stop reason: HOSPADM

## 2022-07-24 RX ORDER — ACETAMINOPHEN 500 MG
1000 TABLET ORAL EVERY 6 HOURS
Status: DISCONTINUED | OUTPATIENT
Start: 2022-07-24 | End: 2022-07-26 | Stop reason: HOSPADM

## 2022-07-24 RX ORDER — METRONIDAZOLE 500 MG/1
500 TABLET ORAL EVERY 8 HOURS SCHEDULED
Status: COMPLETED | OUTPATIENT
Start: 2022-07-24 | End: 2022-07-26

## 2022-07-24 RX ORDER — TRANEXAMIC ACID 10 MG/ML
1000 INJECTION, SOLUTION INTRAVENOUS ONCE
Status: COMPLETED | OUTPATIENT
Start: 2022-07-24 | End: 2022-07-24

## 2022-07-24 RX ORDER — IBUPROFEN 600 MG/1
600 TABLET ORAL EVERY 6 HOURS PRN
Qty: 30 TABLET | Refills: 1 | Status: SHIPPED | OUTPATIENT
Start: 2022-07-24 | End: 2022-08-31

## 2022-07-24 RX ORDER — SODIUM CHLORIDE 9 MG/ML
INJECTION, SOLUTION INTRAVENOUS PRN
Status: DISCONTINUED | OUTPATIENT
Start: 2022-07-24 | End: 2022-07-24

## 2022-07-24 RX ORDER — MORPHINE SULFATE 1 MG/ML
INJECTION, SOLUTION EPIDURAL; INTRATHECAL; INTRAVENOUS PRN
Status: DISCONTINUED | OUTPATIENT
Start: 2022-07-24 | End: 2022-07-24 | Stop reason: SDUPTHER

## 2022-07-24 RX ORDER — ONDANSETRON 2 MG/ML
4 INJECTION INTRAMUSCULAR; INTRAVENOUS EVERY 6 HOURS PRN
Status: DISCONTINUED | OUTPATIENT
Start: 2022-07-24 | End: 2022-07-24

## 2022-07-24 RX ORDER — TRISODIUM CITRATE DIHYDRATE AND CITRIC ACID MONOHYDRATE 500; 334 MG/5ML; MG/5ML
30 SOLUTION ORAL ONCE
Status: COMPLETED | OUTPATIENT
Start: 2022-07-24 | End: 2022-07-24

## 2022-07-24 RX ORDER — OXYCODONE HYDROCHLORIDE 5 MG/1
5 TABLET ORAL EVERY 4 HOURS PRN
Status: DISCONTINUED | OUTPATIENT
Start: 2022-07-24 | End: 2022-07-26 | Stop reason: HOSPADM

## 2022-07-24 RX ORDER — SODIUM CHLORIDE, SODIUM LACTATE, POTASSIUM CHLORIDE, CALCIUM CHLORIDE 600; 310; 30; 20 MG/100ML; MG/100ML; MG/100ML; MG/100ML
INJECTION, SOLUTION INTRAVENOUS CONTINUOUS PRN
Status: DISCONTINUED | OUTPATIENT
Start: 2022-07-24 | End: 2022-07-24 | Stop reason: SDUPTHER

## 2022-07-24 RX ORDER — SODIUM CHLORIDE 0.9 % (FLUSH) 0.9 %
5-40 SYRINGE (ML) INJECTION PRN
Status: DISCONTINUED | OUTPATIENT
Start: 2022-07-24 | End: 2022-07-26 | Stop reason: HOSPADM

## 2022-07-24 RX ORDER — SODIUM CHLORIDE, SODIUM LACTATE, POTASSIUM CHLORIDE, CALCIUM CHLORIDE 600; 310; 30; 20 MG/100ML; MG/100ML; MG/100ML; MG/100ML
INJECTION, SOLUTION INTRAVENOUS CONTINUOUS
Status: DISCONTINUED | OUTPATIENT
Start: 2022-07-24 | End: 2022-07-26 | Stop reason: HOSPADM

## 2022-07-24 RX ORDER — CEPHALEXIN 500 MG/1
500 CAPSULE ORAL EVERY 8 HOURS SCHEDULED
Status: COMPLETED | OUTPATIENT
Start: 2022-07-24 | End: 2022-07-26

## 2022-07-24 RX ORDER — LANOLIN 72 %
OINTMENT (GRAM) TOPICAL
Status: DISCONTINUED | OUTPATIENT
Start: 2022-07-24 | End: 2022-07-26 | Stop reason: HOSPADM

## 2022-07-24 RX ORDER — LIDOCAINE HYDROCHLORIDE 20 MG/ML
INJECTION, SOLUTION EPIDURAL; INFILTRATION; INTRACAUDAL; PERINEURAL PRN
Status: DISCONTINUED | OUTPATIENT
Start: 2022-07-24 | End: 2022-07-24 | Stop reason: SDUPTHER

## 2022-07-24 RX ORDER — NALOXONE HYDROCHLORIDE 0.4 MG/ML
INJECTION, SOLUTION INTRAMUSCULAR; INTRAVENOUS; SUBCUTANEOUS PRN
Status: DISCONTINUED | OUTPATIENT
Start: 2022-07-24 | End: 2022-07-24

## 2022-07-24 RX ORDER — ENOXAPARIN SODIUM 100 MG/ML
60 INJECTION SUBCUTANEOUS DAILY
Status: DISCONTINUED | OUTPATIENT
Start: 2022-07-25 | End: 2022-07-26 | Stop reason: HOSPADM

## 2022-07-24 RX ORDER — ONDANSETRON 2 MG/ML
4 INJECTION INTRAMUSCULAR; INTRAVENOUS EVERY 6 HOURS PRN
Status: DISCONTINUED | OUTPATIENT
Start: 2022-07-24 | End: 2022-07-26 | Stop reason: HOSPADM

## 2022-07-24 RX ORDER — DOCUSATE SODIUM 100 MG/1
100 CAPSULE, LIQUID FILLED ORAL 2 TIMES DAILY
Qty: 30 CAPSULE | Refills: 1 | Status: SHIPPED | OUTPATIENT
Start: 2022-07-24 | End: 2022-08-23

## 2022-07-24 RX ADMIN — CEPHALEXIN 500 MG: 500 CAPSULE ORAL at 21:10

## 2022-07-24 RX ADMIN — SODIUM CHLORIDE, POTASSIUM CHLORIDE, SODIUM LACTATE AND CALCIUM CHLORIDE: 600; 310; 30; 20 INJECTION, SOLUTION INTRAVENOUS at 06:36

## 2022-07-24 RX ADMIN — METRONIDAZOLE 500 MG: 500 TABLET, FILM COATED ORAL at 13:37

## 2022-07-24 RX ADMIN — CHLOROPROCAINE HYDROCHLORIDE 2 ML: 30 INJECTION, SOLUTION EPIDURAL; INFILTRATION; INTRACAUDAL; PERINEURAL at 08:31

## 2022-07-24 RX ADMIN — Medication 87.3 MILLI-UNITS/MIN: at 08:53

## 2022-07-24 RX ADMIN — Medication 1 TABLET: at 13:37

## 2022-07-24 RX ADMIN — KETOROLAC TROMETHAMINE 30 MG: 30 INJECTION, SOLUTION INTRAMUSCULAR; INTRAVENOUS at 17:38

## 2022-07-24 RX ADMIN — Medication 909 ML: at 08:41

## 2022-07-24 RX ADMIN — KETOROLAC TROMETHAMINE 30 MG: 30 INJECTION, SOLUTION INTRAMUSCULAR; INTRAVENOUS at 11:36

## 2022-07-24 RX ADMIN — METRONIDAZOLE 500 MG: 500 TABLET, FILM COATED ORAL at 21:10

## 2022-07-24 RX ADMIN — SODIUM CHLORIDE, POTASSIUM CHLORIDE, SODIUM LACTATE AND CALCIUM CHLORIDE: 600; 310; 30; 20 INJECTION, SOLUTION INTRAVENOUS at 13:00

## 2022-07-24 RX ADMIN — LIDOCAINE HYDROCHLORIDE 5 ML: 20 INJECTION, SOLUTION EPIDURAL; INFILTRATION; INTRACAUDAL; PERINEURAL at 09:24

## 2022-07-24 RX ADMIN — ROPIVACAINE HYDROCHLORIDE 10 ML/HR: 2 INJECTION, SOLUTION EPIDURAL; INFILTRATION at 06:35

## 2022-07-24 RX ADMIN — SODIUM CITRATE AND CITRIC ACID MONOHYDRATE 30 ML: 500; 334 SOLUTION ORAL at 07:51

## 2022-07-24 RX ADMIN — ACETAMINOPHEN 1000 MG: 500 TABLET ORAL at 07:46

## 2022-07-24 RX ADMIN — DOCUSATE SODIUM 100 MG: 100 CAPSULE ORAL at 13:37

## 2022-07-24 RX ADMIN — CHLOROPROCAINE HYDROCHLORIDE 10 ML: 30 INJECTION, SOLUTION EPIDURAL; INFILTRATION; INTRACAUDAL; PERINEURAL at 08:22

## 2022-07-24 RX ADMIN — CEPHALEXIN 500 MG: 500 CAPSULE ORAL at 13:37

## 2022-07-24 RX ADMIN — ACETAMINOPHEN 1000 MG: 500 TABLET ORAL at 13:36

## 2022-07-24 RX ADMIN — SODIUM CHLORIDE, POTASSIUM CHLORIDE, SODIUM LACTATE AND CALCIUM CHLORIDE: 600; 310; 30; 20 INJECTION, SOLUTION INTRAVENOUS at 09:40

## 2022-07-24 RX ADMIN — TRANEXAMIC ACID 1000 MG: 10 INJECTION, SOLUTION INTRAVENOUS at 08:37

## 2022-07-24 RX ADMIN — MORPHINE SULFATE 3 MG: 1 INJECTION, SOLUTION EPIDURAL; INTRATHECAL; INTRAVENOUS at 08:54

## 2022-07-24 RX ADMIN — Medication 2000 MG: at 07:52

## 2022-07-24 RX ADMIN — Medication 166.7 ML: at 08:41

## 2022-07-24 RX ADMIN — FAMOTIDINE 20 MG: 10 INJECTION, SOLUTION INTRAVENOUS at 08:18

## 2022-07-24 RX ADMIN — SODIUM CHLORIDE, POTASSIUM CHLORIDE, SODIUM LACTATE AND CALCIUM CHLORIDE: 600; 310; 30; 20 INJECTION, SOLUTION INTRAVENOUS at 08:16

## 2022-07-24 RX ADMIN — Medication 500 MG: at 08:26

## 2022-07-24 RX ADMIN — ACETAMINOPHEN 1000 MG: 500 TABLET ORAL at 21:10

## 2022-07-24 RX ADMIN — DOCUSATE SODIUM 100 MG: 100 CAPSULE ORAL at 21:10

## 2022-07-24 RX ADMIN — Medication 87.3 ML: at 08:53

## 2022-07-24 RX ADMIN — PHENYLEPHRINE HYDROCHLORIDE 100 MCG: 10 INJECTION INTRAVENOUS at 08:29

## 2022-07-24 ASSESSMENT — PAIN SCALES - GENERAL
PAINLEVEL_OUTOF10: 4
PAINLEVEL_OUTOF10: 1
PAINLEVEL_OUTOF10: 3
PAINLEVEL_OUTOF10: 4

## 2022-07-24 ASSESSMENT — PAIN DESCRIPTION - ORIENTATION
ORIENTATION: LOWER

## 2022-07-24 ASSESSMENT — PAIN DESCRIPTION - LOCATION
LOCATION: ABDOMEN;INCISION
LOCATION: ABDOMEN;BACK
LOCATION: ABDOMEN

## 2022-07-24 ASSESSMENT — PAIN - FUNCTIONAL ASSESSMENT: PAIN_FUNCTIONAL_ASSESSMENT: ACTIVITIES ARE NOT PREVENTED

## 2022-07-24 ASSESSMENT — PAIN DESCRIPTION - DESCRIPTORS
DESCRIPTORS: SORE
DESCRIPTORS: DISCOMFORT;SORE
DESCRIPTORS: ACHING;CRAMPING;SORE

## 2022-07-24 NOTE — OP NOTE
Operative Note  Department of Obstetrics and Gynecology  [address]      Patient: Edita Martin   : 1990  MRN: [id number]       Acct: [de-identified]   PCP: Mary Anne Shannon MD  Date of Procedure: 22    Pre-operative Diagnosis: 33 y.o. female  at 39w2d   Failed Induction  Risk Reducing Induction of Labor   BMI 36     Post-operative Diagnosis: same as above and  living infant Male    Procedure: Primary low transverse  section    Indications: Patient is a 29YO  at 39w0d who presented for a risk reducing induction of labor. Patient received Cytotec 25 mcg PO x5, rachel balloon, pitocin, artifical rupture of membranes (clear fluid), and an epidural. The patient remained at 4cm and was offered a  section secondary to failed induction as patient had not progressed to the active stage of labor for over 24 hours. The patient was counseled on all options, as well as risks and benefits of the operation, and consent was obtained. Patient received Ancef, Azithromycin, TXA preoperatively. Surgeon: Brayan Stanley MD  1 Brad Martinez DO, PGY3; Pankaj Dockery DO, PGY2; Uli Arevalo MD, PGY1     Anesthesia: epidural     Procedure Details   The patient was seen pre-operatively. The risks, benefits, complications, treatment options, and expected outcomes were discussed with the patient. The patient concurred with the proposed plan, giving informed consent. The patient was taken to the Operating Room, identified as Edita Martin and the procedure verified as  Delivery. A Time Out was held and the above information confirmed. After epidural anesthesia, the patient was draped and prepped in the usual sterile manner. A Pfannenstiel incision was made and carried down through the subcutaneous tissue to the fascia using scalpel. Fascial incision was made and extended transversely using aburto scissors for sharp dissection.  The fascia was  from the underlying rectus tissue superiorly and inferiorly using blunt dissection. The peritoneum was identified and entered bluntly. Blunt dissection was used to take the peritoneum down. Peritoneal incision was extended longitudinally with blunt stretch, bladder retractor was placed. A low transverse uterine incision was made using a new scalpel blade. Blunt stretch on the hysterotomy incision was made revealing clear fluid. Delivered from cephalic presentation was a Live Born male infant. The infant was dried and the umbilical cord was clamped and cut after one minute delayed cord clamping. The infant was taken to the warmer and attended by NICU for evaluation. A second section of cord was clamped and cut and sent for gases. Cord blood was obtained for evaluation. The placenta was removed spontaneously with gentle traction and appeared intact, whole, and that the umbilical cord had three vessels noted. Pitocin was started. The uterine outline appeared normal. The uterus was exteriorized and cleaned of all clots and debris. The uterine incision was closed with running sutures of 0 Monocryl. Hemostasis was observed. An imbricating layer was placed with 0 Monocryl in running fashion. The uterus was reintroduced into the abdominal cavity. Bilateral abdominal gutters were cleared of all clots and debris. Bilateral tubes and ovaries were visualized and appeared normal. The hysterotomy was again inspected and found to be hemostatic. Rectus muscles were inspected and found to be hemostatic. Rectus muscles were reapproximated with a figure of eight using a 3-0 Monocryl suture. The fascia was then reapproximated with running sutures of 0 Vicryl. The subcuticular space was irrigated copiously. The subcuticular space was closed using a 2-0 Vicryl suture in a running fashion. The skin was reapproximated with a 4-0 Monocryl. The skin was then cleansed and dressed with a ABD with tape in sterile fashion.      Instrument, sponge, and needle counts were correct prior the abdominal closure and at the conclusion of the case. The urine remained clear throughout the case. Ancef and azithromycin was given for antibiotic prophylaxis. SCDs for DVT prophylaxis remain in place for the post operative period. Dr. Jennifer Mas was present for the entire operation. Findings:  Viable 9 lb 0 oz male infant in cephalic presentation with Apgars of 8 at 1 minute and 9 at five minutes, normal appearing uterus tubes and ovaries   Quantitative Blood Loss: 666mL  Total IV Fluids: 1000mL  Urine output: 800mL yellow urine   Drains:  rachel catheter  Specimens:  cord blood and cord gases  Instrument and Sponge Count: Correct  Complications: none  Condition: Infant stable, transfer to [facility], Mother stable, transfer to post anesthesia recovery      Idania Hawkins MD  OB/GYN Resident  7/24/2022, 9:56 AM    This dictation was performed by a resident physician and then was thoroughly reviewed by the attending prior to being signed. Resident Physician Statement  I have personally seen the patient. I agree with the assessment, plan and orders as documented by erendira resident. I have made changes to the above note as needed. I have discussed the case with above named attending.      Dennis Delacruz,   OB/GYN PGY-3  7/24/2022  2:33 PM

## 2022-07-24 NOTE — BRIEF OP NOTE
Department of Obstetrics and Gynecology  Obstetrical Brief Operative Report  [address]    Patient: Mara Rojas   : 1990  MRN: [id number]       Acct: [de-identified]   Date of Procedure: 22    Pre-operative Diagnosis: 33 y.o. female  at 44w2d   Failed induction  Risk reducing induction of labor   BMI 36    Post-operative Diagnosis: Same as above and  living infant     Procedure: primary low transverse  section    Surgeon: Nikita Mercado MD  Assistant(s): Chon Bustos DO, PGY3; Yelitza Murray MD, PGY1    Anesthesia: epidural     Information for the patient's :  Therisa Brala nena [[id number]]   male   Birth Weight: 9 lb 0.6 oz (4.1 kg)   Information for the patient's :  Balwinder Logan [[id number]]        Findings:  Live Born 9 lb 0 oz male infant in cephalic presentation with Apgars of 8 at 1 minute and 9 at five minutes, normal appearing uterus tubes and ovaries   Estimated Blood Loss: 530 cc immediately post-operatively  Total IV Fluids: 1000ml  Urine output: 800ml clear urine   Drains:  urinary rachel catheter  Specimens:  cord blood, and cord gases  Instrument and Sponge Count: Correct  Complications: none  Condition: Infant stable, transfer to [facility], Mother stable, transfer to post anesthesia recovery    See dictated operative report for full details. Yelitza Murray MD  Ob/Gyn Resident  2022, 9:48 AM    Resident Physician Statement  I have personally seen the patient. I agree with the assessment, plan and orders as documented by erendira resident. I have made changes to the above note as needed. I have discussed the case with above named attending.      Domo Ballesteros DO  OB/GYN PGY-3  2022  9:54 AM

## 2022-07-24 NOTE — ANESTHESIA POSTPROCEDURE EVALUATION
Department of Anesthesiology  Postprocedure Note    Patient: Jules Sahni  MRN: [id number]  YOB: 1990  Date of evaluation: 2022      Procedure Summary     Date: 22 Room / Location: [facility]    Anesthesia Start:  Anesthesia Stop: 22 1006    Procedures:        SECTION (Abdomen)      Labor Analgesia Diagnosis:       Failure to progress in labor      (Arrest of Descent)    Surgeons: Radha Farnsworth MD Responsible Provider: Moreno Padilla MD    Anesthesia Type: epidural ASA Status: 2          Anesthesia Type: No value filed.     Emma Phase I: Emma Score: 10    Emma Phase II:    POST-OP ANESTHESIA NOTE       /73   Pulse 92   Temp 98 °F (36.7 °C) (Oral)   Resp 18   Ht 5' 4\" (1.626 m)   Wt 215 lb (97.5 kg)   LMP 10/09/2021   SpO2 96%   Breastfeeding Unknown   BMI 36.90 kg/m²    Pain Assessment: 0-10  Pain Level: 4      Anesthesia Post Evaluation    Patient location during evaluation: PACU  Patient participation: complete - patient participated  Level of consciousness: awake  Pain score: 4  Airway patency: patent  Nausea & Vomiting: no vomiting and no nausea  Complications: no  Cardiovascular status: hemodynamically stable  Respiratory status: acceptable  Hydration status: stable

## 2022-07-24 NOTE — ANESTHESIA PROCEDURE NOTES
Epidural Block    Patient location during procedure: pre-op  Start time: 7/23/2022 9:56 PM  End time: 7/23/2022 10:02 PM  Reason for block: procedure for pain, labor epidural and at surgeon's request  Staffing  Performed: resident/CRNA   Resident/CRNA: LENNY Hung - CRNA  Epidural  Patient position: sitting  Prep: Betadine  Patient monitoring: cardiac monitor, continuous pulse ox and frequent blood pressure checks  Approach: midline  Location: L4-5  Injection technique: VALARIE air  Provider prep: mask and sterile gloves  Needle  Needle type: Tuohy   Needle gauge: 17 G  Needle length: 3.5 in  Needle insertion depth: 5.5 cm  Catheter type: end hole  Catheter size: 19 G  Catheter at skin depth: 13 cm  Test dose: negativeCatheter Secured: tegaderm and tape  Assessment  Sensory level: T6  Hemodynamics: stable  Attempts: 1  Preanesthetic Checklist  Completed: patient identified, IV checked, risks and benefits discussed, equipment checked, pre-op evaluation, timeout performed, anesthesia consent given, oxygen available and monitors applied/VS acknowledged

## 2022-07-24 NOTE — FLOWSHEET NOTE
Gautam Cedeño CRNA at bedside. 2152 positioned for epidural  2202 catheter placed. 2204 test dose given. 2209 loading dose given. 2209  positioned to low fowlers with left uterine displacement. 2210 2212   pump initiated.      Time out at 2153

## 2022-07-24 NOTE — FLOWSHEET NOTE
Patient states she feels like she had a gush of fluid, large amount bleeding noted on lindsey pad (amount weighed and in QBL) and few clots fundus firm and at umbilicus. Patient states she feels like she could sit on side of bed to clean up and change pad, patient became lightheaded and laid back down for clean up. Patient felt better after laying down.

## 2022-07-24 NOTE — PROGRESS NOTES
Labor Progress Note    Randall Delgado is a 33 y.o. female  at 44w2d  The patient was seen and examined. Her pain is well controlled with epidural. She reports fetal movement is present, complains of contractions, complains of loss of fluid, denies vaginal bleeding. Patient has failed to make change since rachel balloon was spontaneously removed at 1641 on . She has been ruptured on pitocin and has failed to achieve adequate contractions despite maximum dose of pitocin. There is now molding and caput of the fetal head. Discussed findings with patient, potential CPD due to no cervical change and no descent of the fetal head. Patient desired to proceed with  section. Discussed R/B/A including pain, bleeding, infection, damage to surrounding structures (bladder, ureters, bowel, nerves, arteries, and veins), scarring of the skin and within the abdomen, need for further surgeries, anesthesia complications, post- op risks of blood clots or pneumonia. Patient reported understanding of risks and requested to proceed with  section.        Vital Signs:  Vitals:    22 0401 22 0500 22 0501 22 0601   BP: 106/74  111/69 (!) 105/58   Pulse: 90  (!) 102 95   Resp:       Temp:  98.8 °F (37.1 °C)     TempSrc:  Oral     SpO2:       Weight:       Height:             FHT: Baseline 150, moderate variability, accelerations present, decelerations absent, Category 1 tracing  Contractions: regular, every 5 minutes  Cervical Exam: 4 cm dilated, 50 effaced, -2 station  Chaperone for Intimate Exam: Chaperone was present for entire exam, Chaperone Name: Manuela Ireland RN  Pitocin: @ 6 mu/min    Membranes: Ruptured clear fluid  Scalp Electrode in place: absent  Intrauterine Pressure Catheter in Place: present        Assessment/Plan:  Randall Delgado is a 33 y.o. female  at 44w2d admitted for IUP   - GBS negative, No indication for GBS prophylaxis   - Rh pos, Workup for Rhogam not indicated PP    Risk reducing IOL    - VSS, afebrile   - Cat 1 FHT and TOCO showing irregular contractions   - Membranes AROM (clr)   - SVE 4/50%/-2   - cEFM and TOCO   - LR IVF @ 125 mL/hr   - Epidural in place   - IUPC in place   - Pitocin stopped   - CS orders placed: NPO, Ancef 2g, azithromycin, Pepcid, Bicitra, and Tylenol   - Anesthesia notified   - CS consent signed     Discussed with Senior Resident.     Attending updated and in agreement with plan    Jose Simms DO  Ob/Gyn Resident  Pager: [phone] [facility]   7/64/85065:12 AM

## 2022-07-24 NOTE — FLOWSHEET NOTE
Patient assessment completed and charted, fundus firm and at umbilicus, large amount blood noted on large white lindsey pad, no clots noted and no active bleeding noted. Pericare completed and new pads changed. Writer spoke with Dr Douglas Hitchcock for update, no new orders.

## 2022-07-24 NOTE — FLOWSHEET NOTE
Patient to room 750 per bed baby in moms arms and  at side and supportive. Plan of care discussed with patient and , questions and concerns addressed.

## 2022-07-24 NOTE — PROGRESS NOTES
Resident Interval Note    Called to patient's room by RN due to episode of vaginal bleeding. On writer presentation to room, patient resting comfortably in bed. Patient denies any passage of large blood clots or continued heavy vaginal bleeding. She denies lightheadedness or dizziness. Fundus firm. Vital signs stable. Will continue to monitor closely. Vitals:    07/24/22 1220   BP: 112/73   Pulse: 92   Resp: 18   Temp: 98 °F (36.7 °C)   SpO2: 96%     Duane Harris MD  Obstetrics & Gynecology Resident, PGY-1  [address], [place]  7/24/2022 3:07 PM    Resident Physician Statement  I have personally seen the patient. I agree with the assessment, plan and orders as documented by erendira resident. I have made changes to the above note as needed. I have discussed the case with above named attending.      Mark Saavedra DO  OB/GYN PGY-3  7/24/2022  3:08 PM

## 2022-07-24 NOTE — ANESTHESIA PRE PROCEDURE
Department of Anesthesiology  Preprocedure Note       Name:  Desire Laureano   Age:  33 y.o.  :  1990                                          MRN:  [id number]         Date:  2022      Surgeon: * No surgeons listed *    Procedure: * No procedures listed *    Medications prior to admission:   Prior to Admission medications    Medication Sig Start Date End Date Taking?  Authorizing Provider   omeprazole (PRILOSEC) 20 MG delayed release capsule Take 1 capsule by mouth every morning (before breakfast) 6/15/22   LENNY Headley - CNP   Prenatal Vit-Fe Fumarate-FA (PRENATAL PO) Take by mouth    Historical Provider, MD       Current medications:    Current Facility-Administered Medications   Medication Dose Route Frequency Provider Last Rate Last Admin    oxytocin (PITOCIN) 30 units in 500 mL infusion  1 nyasia-units/min IntraVENous Continuous Jaswinder Arreaga DO 10 mL/hr at 22 10 nyasia-units/min at 22    ropivacaine (NAROPIN) 0.2% injection 0.2%             naloxone 0.4 mg in 10 mL sodium chloride syringe   IntraVENous PRN Mayo Nathan MD        nalbuphine (NUBAIN) injection 5 mg  5 mg IntraVENous Q4H PRN Mayo Nathan MD        [facility] 4 mg  4 mg IntraVENous Q6H PRN Mayo Nathan MD        ropivacaine 0.2% in sodium chloride 0.9% (OB) epidural 100 mL  10 mL/hr Epidural Continuous Mayo Nathan MD        ePHEDrine injection 10 mg  10 mg IntraVENous As Directed RT PRN Mayo Nathan MD        lactated ringers infusion   IntraVENous Continuous Esme Bhardwaj,  mL/hr at 22 1549 New Bag at 22 1549    lactated ringers bolus  500 mL IntraVENous PRN Esme Credit, DO        Or    lactated ringers bolus  1,000 mL IntraVENous PRN [place] Credit, DO        sodium chloride flush 0.9 % injection 5-40 mL  5-40 mL IntraVENous 2 times per day [place] Credit, DO        sodium chloride flush 0.9 % injection 5-40 mL  5-40 mL IntraVENous PRN Theola Bucker, DO        0.9 % sodium chloride infusion  25 mL IntraVENous PRN Theola Bucker, DO        lidocaine PF 1 % injection 30 mL  30 mL Other PRN Theola Bucker, DO        diphenhydrAMINE (BENADRYL) tablet 25 mg  25 mg Oral Q4H PRN Theola Bucker, DO        methylergonovine (METHERGINE) injection 200 mcg  200 mcg IntraMUSCular PRN Theola Bucker, DO        carboprost (HEMABATE) injection 250 mcg  250 mcg IntraMUSCular PRN Theola Bucker, DO        miSOPROStol (CYTOTEC) tablet 800 mcg  800 mcg Rectal PRN Theola Bucker, DO        acetaminophen (TYLENOL) tablet 1,000 mg  1,000 mg Oral Q6H PRN Theola Bucker, DO        melatonin tablet 5 mg  5 mg Oral Nightly PRN Alvina Lambert MD   5 mg at 07/22/22 2226       Allergies:  No Known Allergies    Problem List:    Patient Active Problem List   Diagnosis Code    Transverse fetal lie (RSLVD) O32. 3XX0    Encounter for supervision of normal first pregnancy in third trimester Z34.03    High-risk pregnancy, unspecified trimester O09.90       Past Medical History:        Diagnosis Date    Asthma     as child    Seasonal allergies     Stress incontinence 2021    pregnancy       Past Surgical History:        Procedure Laterality Date    WISDOM TOOTH EXTRACTION         Social History:    Social History     Tobacco Use    Smoking status: Never    Smokeless tobacco: Never   Substance Use Topics    Alcohol use: Not Currently                                Counseling given: Not Answered      Vital Signs (Current):   Vitals:    07/23/22 1700 07/23/22 1800 07/23/22 1913 07/23/22 2005   BP: 129/74 127/77 114/68 126/89   Pulse: 95 98  94   Resp: 16 16 16    Temp:       TempSrc:       Weight:       Height:                                                  BP Readings from Last 3 Encounters:   07/23/22 126/89   07/20/22 121/86   07/13/22 120/79       NPO Status: BMI:   Wt Readings from Last 3 Encounters:   07/22/22 215 lb (97.5 kg)   07/20/22 216 lb (98 kg)   07/13/22 217 lb (98.4 kg)     Body mass index is 36.9 kg/m². CBC:   Lab Results   Component Value Date/Time    WBC 12.1 07/22/2022 05:52 PM    RBC 4.36 07/22/2022 05:52 PM    HGB 12.4 07/22/2022 05:52 PM    HCT 37.6 07/22/2022 05:52 PM    MCV 86.2 07/22/2022 05:52 PM    RDW 13.2 07/22/2022 05:52 PM     07/22/2022 05:52 PM       CMP:   Lab Results   Component Value Date/Time    GLUCOSE 111 05/11/2022 09:30 AM    GLUCOSE 89 07/27/2021 07:35 AM       POC Tests: No results for input(s): POCGLU, POCNA, POCK, POCCL, POCBUN, POCHEMO, POCHCT in the last 72 hours. Coags: No results found for: PROTIME, INR, APTT    HCG (If Applicable):   Lab Results   Component Value Date    PREGTESTUR POSITIVE 12/09/2021        ABGs: No results found for: PHART, PO2ART, IEP6GSK, BVQ4TLA, BEART, F0UVCZDY     Type & Screen (If Applicable):  No results found for: LABABO, LABRH    Drug/Infectious Status (If Applicable):  Lab Results   Component Value Date/Time    HEPCAB NONREACTIVE 12/11/2021 09:13 AM       COVID-19 Screening (If Applicable): No results found for: COVID19        Anesthesia Evaluation    Airway: Mallampati: II  TM distance: >3 FB   Neck ROM: full     Dental: normal exam         Pulmonary:normal exam    (+) asthma:                            Cardiovascular:Negative CV ROS            Rhythm: regular  Rate: normal                    Neuro/Psych:   Negative Neuro/Psych ROS              GI/Hepatic/Renal:   (+) GERD:,           Endo/Other: Negative Endo/Other ROS                    Abdominal:             Vascular: negative vascular ROS. Other Findings:           Anesthesia Plan      epidural     ASA 2             Anesthetic plan and risks discussed with patient. Use of blood products discussed with patient whom.                      LENNY Billy CRNA   7/23/2022

## 2022-07-24 NOTE — PROGRESS NOTES
Labor Progress Note    Ángela Hadley is a 33 y.o. female  at 44w2d  The patient was seen and examined. Her pain is well controlled with epidural. She reports fetal movement is present, complains of contractions, complains of loss of fluid, denies vaginal bleeding. Risks/benefits of IUPC discussed with patient and consent was obtained. SVE was performed and IUPC was placed with confirmation of contractions. Patient tolerated the procedure well. Vital Signs:  Vitals:    22 2301 22 2331 22 0031 22 0131   BP: 99/62 111/74 116/76 (!) 93/52   Pulse: 82 98 97 96   Resp:       Temp:   98.3 °F (36.8 °C)    TempSrc:       SpO2:       Weight:       Height:             FHT:  135 , moderate variability, accelerations present, decelerations absent  Contractions: regular, every 2-4 minutes    Chaperone for Intimate Exam: Chaperone was present for entire exam, Chaperone Name: Manuel Boothe RN  Cervical Exam: 4 cm dilated, 50 effaced, -1 station  Pitocin: @ 20 mu/min    Membranes: Ruptured clear fluid  Scalp Electrode in place: absent  Intrauterine Pressure Catheter in Place: present    Interventions: IUPC @ 0200    Assessment/Plan:  Ángela Hadley is a 33 y.o. female  at 44w2d admitted for IUP   - GBS negative, No indication for GBS prophylaxis   - Rh+/RI    Risk Reducing Induction of Labor   - VSS, afebrile   - S/p Morphine/Compazine    - S/p Stephens balloon   - S/p Cytotec 25 PO x5   - AROM (clr) @   - IUPC @ 0200  - Epidural for pain control    - Continue pitocin per protocol    - Continue to monitor closely      Elevated BP x1     - Denies s/sx PreE   - BP normotensive     BMI 36    Attending updated and in agreement with plan    Susana Chapman DO  Ob/Gyn Resident  2022, 2:14 AM    Resident Physician Statement  I have personally seen the patient. I agree with the assessment, plan and orders as documented by erendira resident. I have made changes to the above note as needed.  I have discussed the case with above named attending.      Stacy Bhatt DO  OB/GYN PGY-3  7/24/2022  8:12 AM

## 2022-07-24 NOTE — FLOWSHEET NOTE
Writer perfect served resident Dr Eron Schrader to make him aware of vaginal bleeding at 1350, Dr states he will be over to evaluate.

## 2022-07-24 NOTE — ANESTHESIA PRE PROCEDURE
Department of Anesthesiology  Preprocedure Note       Name:  Gisselle Baker   Age:  33 y.o.  :  1990                                          MRN:  [id number]         Date:  2022      Surgeon: Teressa Madrid):  Merlinda Signs, MD    Procedure: Procedure(s):   SECTION    Medications prior to admission:   Prior to Admission medications    Medication Sig Start Date End Date Taking? Authorizing Provider   omeprazole (PRILOSEC) 20 MG delayed release capsule Take 1 capsule by mouth every morning (before breakfast) 6/15/22   LENNY Feldman - CNP   Prenatal Vit-Fe Fumarate-FA (PRENATAL PO) Take by mouth    Historical Provider, MD       Current medications:    No current facility-administered medications for this visit. No current outpatient medications on file.      Facility-Administered Medications Ordered in Other Visits   Medication Dose Route Frequency Provider Last Rate Last Admin    azithromycin (ZITHROMAX) 500 mg in dextrose 5% 250 mL IVPB  500 mg IntraVENous Once Simi Mejia DO        famotidine (PEPCID) 20 mg in sodium chloride (PF) 10 mL injection  20 mg IntraVENous Once Simi Mejia DO        tranexamic acid-NaCl IVPB premix 1,000 mg  1,000 mg IntraVENous Once Junior Reeves DO        oxytocin (PITOCIN) 30 units in 500 mL infusion  1 nyasia-units/min IntraVENous Continuous Westwood Caulk, DO   Stopped at 22 9611    naloxone 0.4 mg in 10 mL sodium chloride syringe   IntraVENous PRN Judyth Romberg, MD        nalbuphine (NUBAIN) injection 5 mg  5 mg IntraVENous Q4H PRN Judyth Romberg, MD        ondansetron [facility]) injection 4 mg  4 mg IntraVENous Q6H PRN Judyth Romberg, MD   4 mg at 22 2328    ropivacaine 0.2% in sodium chloride 0.9% (OB) epidural 100 mL  10 mL/hr Epidural Continuous Judyth Romberg, MD 10 mL/hr at 22 0635 10 mL/hr at 22 0635    ePHEDrine injection 10 mg  10 mg IntraVENous As Directed RT PRN Judyth Romberg, MD       [employer] Procedure Laterality Date    WISDOM TOOTH EXTRACTION         Social History:    Social History     Tobacco Use    Smoking status: Never    Smokeless tobacco: Never   Substance Use Topics    Alcohol use: Not Currently                                Counseling given: Not Answered      Vital Signs (Current): There were no vitals filed for this visit. BP Readings from Last 3 Encounters:   07/24/22 (!) 105/58   07/20/22 121/86   07/13/22 120/79       NPO Status:                                                                                 BMI:   Wt Readings from Last 3 Encounters:   07/22/22 215 lb (97.5 kg)   07/20/22 216 lb (98 kg)   07/13/22 217 lb (98.4 kg)     There is no height or weight on file to calculate BMI.    CBC:   Lab Results   Component Value Date/Time    WBC 12.1 07/22/2022 05:52 PM    RBC 4.36 07/22/2022 05:52 PM    HGB 12.4 07/22/2022 05:52 PM    HCT 37.6 07/22/2022 05:52 PM    MCV 86.2 07/22/2022 05:52 PM    RDW 13.2 07/22/2022 05:52 PM     07/22/2022 05:52 PM       CMP:   Lab Results   Component Value Date/Time    GLUCOSE 111 05/11/2022 09:30 AM    GLUCOSE 89 07/27/2021 07:35 AM       POC Tests: No results for input(s): POCGLU, POCNA, POCK, POCCL, POCBUN, POCHEMO, POCHCT in the last 72 hours.     Coags: No results found for: PROTIME, INR, APTT    HCG (If Applicable):   Lab Results   Component Value Date    PREGTESTUR POSITIVE 12/09/2021        ABGs: No results found for: PHART, PO2ART, GGR2XDJ, XJR6JQG, BEART, H7VMGGNB     Type & Screen (If Applicable):  No results found for: LABABO, LABRH    Drug/Infectious Status (If Applicable):  Lab Results   Component Value Date/Time    HEPCAB NONREACTIVE 12/11/2021 09:13 AM       COVID-19 Screening (If Applicable): No results found for: COVID19        Anesthesia Evaluation    Airway: Mallampati: II  TM distance: >3 FB   Neck ROM: full     Dental: normal exam         Pulmonary:normal exam    (+) asthma: Cardiovascular:Negative CV ROS            Rhythm: regular  Rate: normal                    Neuro/Psych:   Negative Neuro/Psych ROS              GI/Hepatic/Renal:   (+) GERD:,           Endo/Other: Negative Endo/Other ROS                    Abdominal:   (+) obese,           Vascular: negative vascular ROS. Other Findings:             Anesthesia Plan      epidural     ASA 2             Anesthetic plan and risks discussed with patient. Use of blood products discussed with patient whom. Plan discussed with CRNA.                     Wlalace Jasso MD   7/24/2022

## 2022-07-24 NOTE — CONSULTS
Assisted with positioning and latch, baby fed well during recovery period in football hold. Reviewed  feeding expectations, blood sugar protocol, early cues, and benefits of STS. Education booklet placed at bedside.

## 2022-07-25 ENCOUNTER — CARE COORDINATION (OUTPATIENT)
Dept: OTHER | Facility: CLINIC | Age: 32
End: 2022-07-25

## 2022-07-25 PROBLEM — Z98.890 POSTOPERATIVE STATE: Status: ACTIVE | Noted: 2022-07-25

## 2022-07-25 LAB
ABSOLUTE EOS #: 0.21 K/UL (ref 0–0.44)
ABSOLUTE IMMATURE GRANULOCYTE: 0.33 K/UL (ref 0–0.3)
ABSOLUTE LYMPH #: 1.84 K/UL (ref 1.1–3.7)
ABSOLUTE MONO #: 1.06 K/UL (ref 0.1–1.2)
BASOPHILS # BLD: 1 % (ref 0–2)
BASOPHILS ABSOLUTE: 0.08 K/UL (ref 0–0.2)
EOSINOPHILS RELATIVE PERCENT: 1 % (ref 1–4)
HCT VFR BLD CALC: 22.2 % (ref 36.3–47.1)
HEMOGLOBIN: 7.7 G/DL (ref 11.9–15.1)
IMMATURE GRANULOCYTES: 2 %
LYMPHOCYTES # BLD: 12 % (ref 24–43)
MCH RBC QN AUTO: 29.2 PG (ref 25.2–33.5)
MCHC RBC AUTO-ENTMCNC: 34.7 G/DL (ref 28.4–34.8)
MCV RBC AUTO: 84.1 FL (ref 82.6–102.9)
MONOCYTES # BLD: 7 % (ref 3–12)
NRBC AUTOMATED: 0 PER 100 WBC
PDW BLD-RTO: 13.6 % (ref 11.8–14.4)
PLATELET # BLD: ABNORMAL K/UL (ref 138–453)
PLATELET, FLUORESCENCE: NORMAL K/UL (ref 138–453)
RBC # BLD: 2.64 M/UL (ref 3.95–5.11)
SEG NEUTROPHILS: 77 % (ref 36–65)
SEGMENTED NEUTROPHILS ABSOLUTE COUNT: 11.42 K/UL (ref 1.5–8.1)
WBC # BLD: 14.9 K/UL (ref 3.5–11.3)

## 2022-07-25 PROCEDURE — 36415 COLL VENOUS BLD VENIPUNCTURE: CPT

## 2022-07-25 PROCEDURE — 2580000003 HC RX 258

## 2022-07-25 PROCEDURE — 1220000000 HC SEMI PRIVATE OB R&B

## 2022-07-25 PROCEDURE — 6360000002 HC RX W HCPCS

## 2022-07-25 PROCEDURE — 85025 COMPLETE CBC W/AUTO DIFF WBC: CPT

## 2022-07-25 PROCEDURE — 6370000000 HC RX 637 (ALT 250 FOR IP)

## 2022-07-25 PROCEDURE — 99024 POSTOP FOLLOW-UP VISIT: CPT | Performed by: OBSTETRICS & GYNECOLOGY

## 2022-07-25 PROCEDURE — 85055 RETICULATED PLATELET ASSAY: CPT

## 2022-07-25 RX ORDER — LANOLIN ALCOHOL/MO/W.PET/CERES
325 CREAM (GRAM) TOPICAL 2 TIMES DAILY
Qty: 90 TABLET | Refills: 3 | Status: SHIPPED | OUTPATIENT
Start: 2022-07-25

## 2022-07-25 RX ADMIN — DOCUSATE SODIUM 100 MG: 100 CAPSULE ORAL at 08:25

## 2022-07-25 RX ADMIN — ACETAMINOPHEN 1000 MG: 500 TABLET ORAL at 11:37

## 2022-07-25 RX ADMIN — METRONIDAZOLE 500 MG: 500 TABLET, FILM COATED ORAL at 06:58

## 2022-07-25 RX ADMIN — KETOROLAC TROMETHAMINE 30 MG: 30 INJECTION, SOLUTION INTRAMUSCULAR; INTRAVENOUS at 00:58

## 2022-07-25 RX ADMIN — IBUPROFEN 600 MG: 600 TABLET, FILM COATED ORAL at 06:58

## 2022-07-25 RX ADMIN — CEPHALEXIN 500 MG: 500 CAPSULE ORAL at 13:54

## 2022-07-25 RX ADMIN — ACETAMINOPHEN 1000 MG: 500 TABLET ORAL at 18:27

## 2022-07-25 RX ADMIN — METRONIDAZOLE 500 MG: 500 TABLET, FILM COATED ORAL at 21:38

## 2022-07-25 RX ADMIN — IBUPROFEN 600 MG: 600 TABLET, FILM COATED ORAL at 13:54

## 2022-07-25 RX ADMIN — DOCUSATE SODIUM 100 MG: 100 CAPSULE ORAL at 21:38

## 2022-07-25 RX ADMIN — SODIUM CHLORIDE, PRESERVATIVE FREE 10 ML: 5 INJECTION INTRAVENOUS at 21:39

## 2022-07-25 RX ADMIN — CEPHALEXIN 500 MG: 500 CAPSULE ORAL at 06:58

## 2022-07-25 RX ADMIN — ENOXAPARIN SODIUM 60 MG: 100 INJECTION SUBCUTANEOUS at 08:28

## 2022-07-25 RX ADMIN — METRONIDAZOLE 500 MG: 500 TABLET, FILM COATED ORAL at 13:54

## 2022-07-25 RX ADMIN — CEPHALEXIN 500 MG: 500 CAPSULE ORAL at 21:38

## 2022-07-25 RX ADMIN — IBUPROFEN 600 MG: 600 TABLET, FILM COATED ORAL at 21:38

## 2022-07-25 RX ADMIN — Medication 1 TABLET: at 08:26

## 2022-07-25 RX ADMIN — ACETAMINOPHEN 1000 MG: 500 TABLET ORAL at 03:56

## 2022-07-25 RX ADMIN — SODIUM CHLORIDE, PRESERVATIVE FREE 10 ML: 5 INJECTION INTRAVENOUS at 08:26

## 2022-07-25 ASSESSMENT — PAIN SCALES - GENERAL
PAINLEVEL_OUTOF10: 1
PAINLEVEL_OUTOF10: 2
PAINLEVEL_OUTOF10: 3
PAINLEVEL_OUTOF10: 2
PAINLEVEL_OUTOF10: 2
PAINLEVEL_OUTOF10: 3
PAINLEVEL_OUTOF10: 2
PAINLEVEL_OUTOF10: 2

## 2022-07-25 ASSESSMENT — PAIN DESCRIPTION - DESCRIPTORS
DESCRIPTORS: SORE;CRAMPING
DESCRIPTORS: SORE
DESCRIPTORS: SORE;DISCOMFORT
DESCRIPTORS: DISCOMFORT;SORE
DESCRIPTORS: CRAMPING

## 2022-07-25 ASSESSMENT — PAIN - FUNCTIONAL ASSESSMENT
PAIN_FUNCTIONAL_ASSESSMENT: ACTIVITIES ARE NOT PREVENTED

## 2022-07-25 ASSESSMENT — PAIN DESCRIPTION - ORIENTATION
ORIENTATION: LOWER

## 2022-07-25 ASSESSMENT — PAIN DESCRIPTION - LOCATION
LOCATION: ABDOMEN;INCISION
LOCATION: ABDOMEN;INCISION
LOCATION: ABDOMEN
LOCATION: ABDOMEN;INCISION
LOCATION: ABDOMEN

## 2022-07-25 NOTE — CARE COORDINATION
ACM reviewed patient's records. Patient is currently IP and planning for a c/section delivery today r/t failure to progress while in active labor. ACM will review patient's chart again within 2-3 business days for delivery and discharge planning. MIMI Sun, RN  Associate Care Manager   Cell: [phone]  [email or web address]

## 2022-07-25 NOTE — FLOWSHEET NOTE
Pt up to bathroom and called out with a fist size clot. Clot weighed and added to QBL. Pt asymptomatic. Vitals stable. Pt fundus firm and at midline U/U with no active bleeding. Writer will continue to reach out to residents.

## 2022-07-25 NOTE — CARE COORDINATION
CASE MANAGEMENT POST-PARTUM TRANSITIONAL CARE PLAN    High-risk pregnancy, unspecified trimester [O09.90]    OB Provider: Addie Sims met srini/ Sonia at bedside to discuss DCP. She is S/P CS on 2022    Writer verified name/address/phone number correct on facesheet. She states she lives with her  Peggye Cabot and their dog. [employer] correct. Writer notified she has 30 days from date of birth to add  to insurance policy. She verbalized understanding. Sonia confirmed a safe place for infant to sleep at home. Infant name on BC: Checo Beard.    Infant PCP [facility]    DME: no  HOME CARE: no    Anticipate DC of couplet 2022    Readmission Risk              Risk of Unplanned Readmission:  [id number]

## 2022-07-25 NOTE — LACTATION NOTE
Attempted to wake the baby for a feeding. Baby very sleepy. Took 13 mls of formula. Mom to call out when baby alerts for a feeding.

## 2022-07-25 NOTE — PROGRESS NOTES
umbilicus  Incision: ABD/Pressure dressing in place and is dry and intact with minimal serosanguinous discharge present. Pressure dressing removed and replaced with a Silver Dressing. Extremities:  no calf tenderness, non edematous    Labs:  Lab Results   Component Value Date    WBC 12.1 (H) 2022    HGB 12.4 2022    HCT 37.6 2022    MCV 86.2 2022     2022       Assessment/Plan:  Zahida Campuzano is a  POD # 1 s/p PLTCS   - Doing well, VSS    - male infant in [address], circumcision desired   - Motrin/Tylenol/Jasmin for pain control    - Keflex/Flagyl for wound prophylaxis    - Lovenox for DVT prophylaxis    - Encourage ambulation and use of incentive spirometer   - S/p TXA preoperatively    - CBC awaiting   - Pressure dressing replaced w/ silver dressing this morning   Rh positive/Rubella immune  Breast feeding    - Denies s/s of mastitis   Elevated BP x1    - Normotensive overnight   - Does not meet criteria for hypertensive disorder of pregnancy    - Denies s/s of PreE    - Continue to monitor closely   Intraoperative hemorrhage    - VSS, patient is hemodynamically stable   - Postoperative Hgb pending    - QBL 1092mL    - Patient denies s/s of anemia at this time    - S/p TXA preoperatively   BMI 36  Continue post-op care. Counseling Completed:  Secondary Smoke risks and Sudden Infant Death Syndrome were reviewed with recommendations. Infant sleeping, \"back to sleep\" and avoidance of co-sleeping recommendations were reviewed. Signs and Symptoms of Post Partum Depression were reviewed. The patient is to call if any occur. Signs and symptoms of Mastitis were reviewed. The patient is to call if any occur for follow up.   Discharge instructions including pelvic rest, incision care, 15 lb weight restriction, no driving with pain medicine and office follow-up were reviewed with patient     Attending Physician: Dr. Eric Gauthier MD  Ob/Gyn Resident  2022, 5:40 AM     Senior Residents ASUNCION [address] 106  I was present with the resident physician during the history and exam. I discussed the findings and plans with the resident physician and agree as documented in their note     Deedee Carrasco DO   OB/GYN Resident, [address] [place] [address] [place]  2022, 6:49 AM       Date: 2022  Time: 10:59 AM      Patient Name: Tiff Robert  Patient : 1990  Room/Bed: 0594/8890-17  Admission Date/Time: 2022  3:43 PM        Attending Physician Statement  I have personally seen, evaluated and discussed the care of Tiff Robert, including pertinent history and exam findings with the resident. I have reviewed and edited their note in the electronic medical record. The key elements of all parts of the encounter have been performed/reviewed by me. I agree with the assessment, plan and orders as documented by the resident. The level of care submitted represents to the best of my ability the care documented in the medical record today. GC Modifier. This service has been performed in part by a resident under the direction of a teaching physician. Attending's Name:  Niko Durham MD      Patient doing well. She has no concerns or complaints. Continue routine post-partum care.

## 2022-07-26 VITALS
HEIGHT: 64 IN | BODY MASS INDEX: 36.7 KG/M2 | OXYGEN SATURATION: 97 % | SYSTOLIC BLOOD PRESSURE: 110 MMHG | TEMPERATURE: 98 F | DIASTOLIC BLOOD PRESSURE: 72 MMHG | RESPIRATION RATE: 16 BRPM | HEART RATE: 98 BPM | WEIGHT: 215 LBS

## 2022-07-26 PROBLEM — T81.89XA INTRAOPERATIVE HEMORRHAGE: Status: ACTIVE | Noted: 2022-07-26

## 2022-07-26 PROBLEM — J45.909 ASTHMA: Status: ACTIVE | Noted: 2022-07-26

## 2022-07-26 PROBLEM — O32.2XX0 TRANSVERSE FETAL LIE: Status: RESOLVED | Noted: 2022-03-14 | Resolved: 2022-07-26

## 2022-07-26 PROCEDURE — 6370000000 HC RX 637 (ALT 250 FOR IP)

## 2022-07-26 PROCEDURE — 6360000002 HC RX W HCPCS

## 2022-07-26 RX ADMIN — METRONIDAZOLE 500 MG: 500 TABLET, FILM COATED ORAL at 05:45

## 2022-07-26 RX ADMIN — ENOXAPARIN SODIUM 60 MG: 100 INJECTION SUBCUTANEOUS at 08:50

## 2022-07-26 RX ADMIN — Medication 1 TABLET: at 08:49

## 2022-07-26 RX ADMIN — IBUPROFEN 600 MG: 600 TABLET, FILM COATED ORAL at 05:44

## 2022-07-26 RX ADMIN — DOCUSATE SODIUM 100 MG: 100 CAPSULE ORAL at 08:50

## 2022-07-26 RX ADMIN — CEPHALEXIN 500 MG: 500 CAPSULE ORAL at 05:45

## 2022-07-26 RX ADMIN — ACETAMINOPHEN 1000 MG: 500 TABLET ORAL at 05:44

## 2022-07-26 RX ADMIN — ACETAMINOPHEN 1000 MG: 500 TABLET ORAL at 00:22

## 2022-07-26 ASSESSMENT — PAIN SCALES - GENERAL
PAINLEVEL_OUTOF10: 4
PAINLEVEL_OUTOF10: 2

## 2022-07-26 ASSESSMENT — PAIN DESCRIPTION - LOCATION
LOCATION: INCISION
LOCATION: INCISION

## 2022-07-26 ASSESSMENT — PAIN DESCRIPTION - DESCRIPTORS
DESCRIPTORS: SORE;TENDER
DESCRIPTORS: SORE;TENDER

## 2022-07-26 NOTE — FLOWSHEET NOTE
Discharge instructions given to patient. No concerns or questions at this time. Meds to Beds delivered.

## 2022-07-26 NOTE — LACTATION NOTE
Mom reports she is exclusively pumping d/t nipple pain and damage. Soshauna and thu given. Has list of specialists to have baby's mouth evaluated for restrictions. Reviewed discharge instructions and LC follow up.

## 2022-07-26 NOTE — DISCHARGE INSTRUCTIONS
Learning About Preeclampsia After Childbirth  What is preeclampsia? Preeclampsia means that your blood pressure during pregnancy is higher thanusual. You may also have other serious symptoms. Preeclampsia can be dangerous. When it is severe, it can cause seizures (eclampsia) or liver or kidney damage. When it affects the liver, it can cause HELLP syndrome, a blood-clotting and bleeding problem. HELLP can come on quickly and can be deadly. This is why your doctor checks you and your babyoften. Preeclampsia usually occurs after 20 weeks of pregnancy. Most often, it starts near the end of pregnancy and goes away after childbirth. But symptoms may last a few weeks or more and can get worse after delivery. Rarely, symptoms ofpreeclampsia don't show up until days or even weeks after childbirth. What are the symptoms? Mild preeclampsia usually doesn't cause symptoms. But preeclampsia can causerapid weight gain and sudden swelling of the hands and face. Severe preeclampsia does cause symptoms. It can cause a very bad headache and trouble seeing and breathing. It also can cause belly pain. You may alsourinate less than usual.  What can you expect after you have had preeclampsia? In the hospital  After the baby and the placenta are delivered, preeclampsia usually starts toimprove. Most women get better in the first few days after childbirth. After having preeclampsia, you still have a risk of seizures for a day or more after childbirth. (Very rarely, seizures happen later on.) So your doctor may have you take magnesium sulfate for a day or more to prevent seizures. You mayalso take medicine to lower your blood pressure. When you go home  Your blood pressure will most likely return to normal a few days after delivery. Your doctor will want to check your blood pressure sometime in thefirst week after you leave the hospital.  Some women still have high blood pressure 6 weeks after childbirth.  But mostreturn to normal levels over the long term. Take and record your blood pressure at home if your doctor tells you to. Ask your doctor to check your blood pressure monitor to be sure that it is accurate and that the cuff fits you. Also ask your doctor to watch you use it, to make sure that you are using it right. You should not eat, use tobacco products, or use medicine known to raise blood pressure (such as some nasal decongestant sprays) before you take your blood pressure. Avoid taking your blood pressure if you have just exercised. Also avoid taking it if you are nervous or upset. Rest at least 15 minutes before you take your blood pressure. Be safe with medicines. If you take medicine, take it exactly as prescribed. Call your doctor if you think you are having a problem with your medicine. Do not smoke. Quitting smoking will help improve your baby's growth and health. If you need help quitting, talk to your doctor about stop-smoking programs and medicines. These can increase your chances of quitting for good. Eat a balanced and healthy diet that has lots of fruits and vegetables. Long-term health  After you have had preeclampsia, you have a higher-than-average risk of heart disease, stroke, and kidney disease. This may be because the same things thatcause preeclampsia also cause heart and kidney disease. To protect your health, work with your doctor on living a heart-healthy lifestyle and getting the checkups you need. Your doctor may also want you tocheck your blood pressure at home. Follow-up care is a key part of your treatment and safety. Be sure to make and go to all appointments, and call your doctor if you are having problems. It's also a good idea to know your test results and keep alist of the medicines you take. When should you call for help? Share this information with your partner or a friend. They can help you watch for warning signs. Call 911 anytime you think you may need emergency care.  For example, call if:    You passed out (lost consciousness). You have a seizure. Call your doctor now or seek immediate medical care if:    You have symptoms of preeclampsia, such as:  Sudden swelling of your face, hands, or feet. New vision problems (such as dimness, blurring, or seeing spots). A severe headache. Your blood pressure is very high, such as 160/110 or higher. Your blood pressure is higher than your doctor told you it should be, or it rises quickly. You have new nausea or vomiting. You have pain in your belly or pelvis. Watch closely for changes in your health, and be sure to contact your doctor if:    You gain weight rapidly. Where can you learn more? Go to https://[employer] Rocketeweb.G[employer]CON. org and sign in to your [employer] account. Enter M190 in the Markado box to learn more about \"Learning About Preeclampsia After Childbirth. \"     If you do not have an account, please click on the \"Sign Up Now\" link. Current as of: February 23, 2022               Content Version: 13.3  © 2006-2022 [employer]wise, Incorporated. Care instructions adapted under license by [facility]. If you have questions about a medical condition or this instruction, always ask your healthcare professional. [place] [address] any warranty or liability for your use of this information.

## 2022-07-26 NOTE — PLAN OF CARE
Problem: Pain  Goal: Verbalizes/displays adequate comfort level or baseline comfort level  Outcome: Progressing Towards Goal     Problem: Pain  Goal: Verbalizes/displays adequate comfort level or baseline comfort level  Outcome: Progressing Towards Goal

## 2022-07-26 NOTE — PROGRESS NOTES
CLINICAL PHARMACY NOTE: MEDS TO BEDS    Total # of Prescriptions Filled: 5   The following medications were delivered to the patient:  Oxycodone 5mg  Ibuprofen 600mg  Colace 100mg  Iron 325mg  Acetaminophen 500mg    Additional Documentation: delivered to patient in room 750 7/26 at 10:42am. Co-pay $26.00 clover. ! Visitor at time of delivery.

## 2022-07-26 NOTE — PROGRESS NOTES
POST OPERATIVE DAY # 2     Gokul Phelps is a 33 y.o. female   This patient was seen and examined today. Her pregnancy was complicated by:   Patient Active Problem List   Diagnosis    Transverse fetal lie (RSLVD)    Encounter for supervision of normal first pregnancy in third trimester    High-risk pregnancy, unspecified trimester    Arrest of dilation, delivered, current hospitalization    PLTCS 7/24/22 M Apg 8/9 Wt 9#0    Postoperative state    BMI 36.0-36.9,adult       Today she is doing well without any chief complaint. Her lochia is light. She denies chest pain, shortness of breath, headache, blurred vision, and peripheral edema. She is  breast feeding and she denies any signs or symptoms of mastitis. She is ambulating well. She is voiding without difficulty. She currently denies S/S of postpartum depression. Flatus present. Bowel movement absent. She is tolerating solids.     Vital Signs:  Vitals:    07/25/22 0356 07/25/22 0800 07/25/22 1600 07/25/22 2222   BP: 110/70 100/70 126/68 111/71   Pulse: (!) 104 97 (!) 103 (!) 109   Resp: 19 16 16 16   Temp: 98 °F (36.7 °C) 98.1 °F (36.7 °C) 97.6 °F (36.4 °C) 98.8 °F (37.1 °C)   TempSrc: Oral Oral Oral Oral   SpO2: 98% 100%  98%   Weight:       Height:             Urine Input & Output last 24hrs:     Intake/Output Summary (Last 24 hours) at 7/26/2022 0427  Last data filed at 7/25/2022 1755  Gross per 24 hour   Intake --   Output 800 ml   Net -800 ml       Physical Exam:  General:  no apparent distress, alert, and cooperative  Neurologic:  alert, oriented, normal speech, no focal findings or movement disorder noted  Lungs:  No increased work of breathing, good air exchange, clear to auscultation bilaterally, no crackles or wheezing  Heart:  regular rate and rhythm    Abdomen: abdomen soft, non-distended, non-tender  Fundus: non-tender, normal size, firm, below umbilicus  Incision: Silver dressing is clean, dry, and intact  Extremities:  no calf tenderness, non edematous    Labs:  Lab Results   Component Value Date    WBC 14.9 (H) 2022    HGB 7.7 (L) 2022    HCT 22.2 (L) 2022    MCV 84.1 2022    PLT See Reflexed IPF Result 2022       Assessment/Plan:  Severa Agent is a  POD # 2 s/p PLTCS   - Doing well,  VSS    - male infant in [address], circumcision completed    - Motrin/tylenol/juancarlos for pain control   - S/p keflex/flagyl   - Lovenox for DVT prophylaxis     - Encourage ambulation and use of incentive spirometer   - CBC postoperatively: 7.7, patient is hemodynamically stable and denies s/s of anemai   Rh positive/Rubella immune  Breast feeding    - Denies s/s of mastitis   Elevated BP x1    - Normotensive overnight    - Denies s/s of preE     - Continue to monitor   Intraoperative hemorrhage    - VSS, patient is hemodynamically stable    - Postoperative Hgb of 7.1, however, patient's vitals remained stable and has been clinically asymptomatic throughout postoperative perioed    - QBL 1092ml    - S/p TXA preoperatively   Continue post-op care. - Patient desires d/c today     Counseling Completed:  Secondary Smoke risks and Sudden Infant Death Syndrome were reviewed with recommendations. Infant sleeping, \"back to sleep\" and avoidance of co-sleeping recommendations were reviewed. Signs and Symptoms of Post Partum Depression were reviewed. The patient is to call if any occur. Signs and symptoms of Mastitis were reviewed. The patient is to call if any occur for follow up.   Discharge instructions including pelvic rest, incision care, 15 lb weight restriction, no driving with pain medicine and office follow-up were reviewed with patient     Attending Physician: Dr. Avila Christine MD  Ob/Gyn Resident  2022, 4:27 AM  Senior Residents Attestation Statement  I was present with the resident physician during the history and exam. I discussed the findings and plans with the resident physician and agree as documented in their note     Mike Huang DO   OB/GYN Resident, [address] [place] [address], [address]  7/26/2022, 7:16 AM

## 2022-07-27 ENCOUNTER — NURSE TRIAGE (OUTPATIENT)
Dept: OTHER | Age: 32
End: 2022-07-27

## 2022-07-27 NOTE — TELEPHONE ENCOUNTER
Informed patient that a message will be sent to the on call physician and will be calling her back. Patient verbalized understanding. Sent message to González Lawrence MD via PS; Read @ 5:51pm. Waiting for call back. At 6:20pm, sent a follow up message to González Lawrence MD via PS; Read @ 6:20pm.  At 6:28pm, patient called back and informed the service that her temperature is going up. At 6:50pm called the in-house OB and spoke to Dr. Sugey Morales, informed of patient condition and concerns. Dr. Supa Thompson states to send another message to the on call resident. At 6:56pm Sent message to González Lawrence MD via PS; At 7:00pm, called patient, patient states her temperature is 101.4F and has chills. Patient states she already spoke with the resident and was given medical advice. Patient does not have any additional questions at this time.  LYDIA, RN

## 2022-07-27 NOTE — TELEPHONE ENCOUNTER
Answer Assessment - Initial Assessment Questions  1. REASON FOR CALL or QUESTION: \"What is your reason for calling today? \" or \"How can I best help you? \" or \"What question do you have that I can help answer? \"      Patient had her  on . Calling today because of fever of 100.1F. Patient also states her hands and feet are cold. Patient states her feet are swollen but states it has been swollen since pregnancy; states feet are bigger that they have been. Patient also added her incision still has a pressure bandage that she will need to leave it on until Monday; states bandage staying the same. Asking for care advice. Protocols used:  Information Only Call - No Triage-ADULT-

## 2022-07-28 ENCOUNTER — CARE COORDINATION (OUTPATIENT)
Dept: OTHER | Facility: CLINIC | Age: 32
End: 2022-07-28

## 2022-07-28 NOTE — CARE COORDINATION
overwhelm-None  Breast Feeding Yes  Patient stated delivery experience: patient reports that delivery was a bit rough but she is happy with her care  Risk factors for ED visit or readmission: None      Patient given an opportunity to ask questions and does not have any further questions or concerns at this time. Were discharge instructions available to patient? Yes. Reviewed appropriate site of care based on symptoms and resources available to patient including: PCP  Specialist  Benefits related nurse triage line  Urgent care clinics  [facility] 24/7  When to call [address] The patient agrees to contact the OB/Gyn office for questions related to their healthcare. Plan for follow-up call in 10-14 days based on severity of symptoms and risk factors. Plan for next call: symptom management-any new s/s?  self management-any new concerns?  follow up appointment-incision check next week         Goals        Attends follow-up appointments as directed      Educate and encourage importance of FU for prevention of complications or disease;  Assess the patient's relationship with a PCP and next FU visit scheduled; Discuss importance of adherence to treatment plan and follow up visits; Identify any barriers in transportation or access to FU appointments. Assist patient with making FU appointments as needed;   It's also a good idea to know your test results. Keep a list of the medicines you take. Patient will identify signs and symptoms requiring evaluation and intervention      Demonstrate how client is to evaluate contraction activity after discharge (e.g., lying down, tilted to the side with a pillow to the back, placing fingertips on the fundus for approximately 1 hour to note hardening or tightening of the uterus). Identify signs and/or symptoms that should be reported immediately to the healthcare provider (e.g, sustained uterine contractions, clear drainage from vagina, bleeding).   Provide information about follow-up care when client is discharged. Advise client to empty bladder every two (2) hours while awake  Review daily fluid need; avoid coffee. Encourage regular rest periods 2-3 times a day in side-lying position. If bedrest is to be continued after discharge, suggest client spend part of day on couch or recliner. Assist patient to identify any risky behaviors and support change;     Red flags maternity  Call 911 anytime you think you may need emergency care. For example, call if:  Call your doctor now or seek immediate medical care if:     You passed out (lost consciousness). You have a seizure. You have severe vaginal bleeding. You have severe pain in your belly or pelvis. You have had fluid gushing or leaking from your vagina and you know or think the  umbilical cord is bulging into your vagina. If this happens, immediately get down on  your knees so your rear end (buttocks) is higher than your head. This will decrease  the pressure on the cord until help arrives. You have signs of preeclampsia, such as:  Sudden swelling of your face, hands, or feet. New vision problems (such as dimness, blurring, or seeing spots). A severe headache. You have any vaginal bleeding. You have belly pain or cramping. You have a fever. You have had regular contractions (with or without pain) for an hour. This means that  you have 8 or more within 1 hour or 4 or more in 20 minutes after you change your  position and drink fluids. You have a sudden release of fluid from your vagina. You have low back pain or pelvic pressure that does not go away. You notice that your baby has stopped moving or is moving much less than normal.            Summary Note: Today patient states that she is doing well without any chief complaint. Patient is now 4 days PP following a c/section delivery. Her lochia is minimal to moderate.  She denies chest pain, shortness of breath, headache, lightheadedness, and blurred

## 2022-08-03 ENCOUNTER — POSTPARTUM VISIT (OUTPATIENT)
Dept: OBGYN CLINIC | Age: 32
End: 2022-08-03

## 2022-08-03 VITALS
SYSTOLIC BLOOD PRESSURE: 123 MMHG | DIASTOLIC BLOOD PRESSURE: 88 MMHG | WEIGHT: 196.2 LBS | BODY MASS INDEX: 33.68 KG/M2 | HEART RATE: 105 BPM

## 2022-08-03 PROBLEM — Z34.03 ENCOUNTER FOR SUPERVISION OF NORMAL FIRST PREGNANCY IN THIRD TRIMESTER: Status: RESOLVED | Noted: 2022-07-19 | Resolved: 2022-08-03

## 2022-08-03 PROBLEM — O09.90 HIGH-RISK PREGNANCY, UNSPECIFIED TRIMESTER: Status: RESOLVED | Noted: 2022-07-22 | Resolved: 2022-08-03

## 2022-08-03 PROBLEM — Z98.890 POSTOPERATIVE STATE: Status: RESOLVED | Noted: 2022-07-25 | Resolved: 2022-08-03

## 2022-08-03 PROCEDURE — 99024 POSTOP FOLLOW-UP VISIT: CPT | Performed by: STUDENT IN AN ORGANIZED HEALTH CARE EDUCATION/TRAINING PROGRAM

## 2022-08-03 NOTE — PROGRESS NOTES
Postpartum Visit    Cassandra White is a 33 y.o. female , 1 week Post Partum s/p primary  section, low transverse incision on 22    The patient was seen. She has no chief complaint today. Her pregnancy was complicated by Arrest of Dilation and intra-op hemorrhage. She is  breast feeding and denies signs or symptoms of mastitis. The patient completed the E.P.D.S. Post Partum Depression Evaluation form and scored 3. She does not have signs or symptoms of post partum depression. She denies any suicidal thoughts with a plan, intent to harm others, and delusional ideas. She does admit to having good home support. Today her lochia is light she denies any dizziness or shortness of breath. Her bowels are regular and she denies any urinary tract symptomology. She is using abstinence for family planning and for STD prevention. OB History    Para Term  AB Living   1 1 1 0 0 1   SAB IAB Ectopic Molar Multiple Live Births   0 0 0 0 0 1     Patient Active Problem List   Diagnosis    PLTCS 22 M Apg 8/9 Wt 9#0    BMI 36.0-36.9,adult    Intraoperative hemorrhage    Childhood Asthma       Vitals:   Blood pressure 123/88, pulse (!) 105, weight 196 lb 3.2 oz (89 kg), last menstrual period 10/09/2021, currently breastfeeding. Physical Exam:  General:  no apparent distress, alert and cooperative  Lungs:  No increased work of breathing, good air exchange, clear to auscultation bilaterally, no crackles or wheezing  Heart:  regular rate and rhythm and no murmur    Abdomen: Abdomen soft, non-tender.  BS normal. No masses,  No organomegaly  Fundus: non-tender, normal size, firm, below umbilicus  Perineum: not inspected  Incision: clean, dry, and intact, silver dressing removed  Extremities:  no calf tenderness, non edematous    Assessment:  Cassandra White is a 33 y.o. female , 1 week Post Partum s/p primary  section, low transverse incision on 22   - Stable, continue routine post partum care    Patient Active Problem List    Diagnosis Date Noted    Childhood Asthma 07/26/2022     Priority: Medium    BMI 36.0-36.9,adult 07/25/2022     Priority: Medium    PLTCS 7/24/22 M Apg 8/9 Wt 9#0 07/24/2022     Priority: Medium    Intraoperative hemorrhage 07/26/2022     QBL 1092 mL  Hgb 12.4>7.7       Return in about 4 weeks (around 8/31/2022) for PP Visit. Counseling Completed:  Signs & Symptoms of mastitis and when to notify office discussed.   Secondary smoke risks including increased risks of respiratory problems, Sudden infant death syndrome, and potential malignancies discussed  Abstinence, family planning counseling and STD counseling discussed  Continue with post operative restrictions until 6 weeks post partum  No heavy lifting or [place] until 6 weeks post partum    Noel Fortune, [address] [facility]   8/3/2022, 9:49 AM

## 2022-08-11 ENCOUNTER — CARE COORDINATION (OUTPATIENT)
Dept: OTHER | Facility: CLINIC | Age: 32
End: 2022-08-11

## 2022-08-11 NOTE — CARE COORDINATION
Last Discharge [facility]       Date Complaint Diagnosis Description Type Department Provider    22 Scheduled Induction PLTCS 22 M Apg 8/9 Wt 9#0 Admission (Discharged) Roylene Goodell, [facility] [place] by telephone to discuss the maternity management program.  Patient agrees to care management services at this time. Verified name and  with patient as identifiers. Risk Factors Identified:  None      Needs to be reviewed by the provider   none         Method of communication with provider : none    Advance Care Planning:   Does patient have an Advance Directive:  reviewed and current. Does patient have OB/Gyn Selected? Yes    Discussed follow up appointments. If no appointment was previously scheduled, appointment scheduling offered: Yes  [facility] follow up appointment(s):   Future Appointments   Date Time Provider Manfred Baez   2022  9:30 AM DO Sanjay Viveros [id number]     Non-[facility] follow up appointment(s): n/a    OB History:   OB History    Para Term  AB Living   1 1 1 0 0 1   SAB IAB Ectopic Molar Multiple Live Births   0 0 0 0 0 1      # Outcome Date GA Lbr José Miguel/2nd Weight Sex Delivery Anes PTL Lv   1 Term 22 39w2d  9 lb 0.6 oz (4.1 kg) M CS-LTranv EPI N NENA       Unknown    Medication reconciliation was performed with patient, who verbalizes understanding of administration of home medications. Advised obtaining a 90-day supply of all daily and as-needed medications. Barriers/Support system:  patient and spouse  Return to work planning? Yes, RTW at 16 weeks PP      Postpartum Assessment:    Lochia-resolved  Incision-healing well  Fever No  BM?  Yes   Decreased urinary output No  Perineal care-none at this time  Visual changes No  Calf pain No  Headache No  Swelling No  Breast Pain No  Depression or feelings of sadness or overwhelm-None  Breast Feeding weaning off BF    Patient given an opportunity to ask questions and does not have any further questions or concerns at this time. Were discharge instructions available to patient? Yes. Reviewed appropriate site of care based on symptoms and resources available to patient including: PCP  Specialist  Benefits related nurse triage line  Urgent care clinics  [facility] 24/7  When to call [address] The patient agrees to contact the OB/Gyn office for questions related to their healthcare. Plan for a follow-up call in 4 weeks  based on severity of symptoms and risk factors. Plan for next call: symptom management-any new s/s?  self management-any new concerns?  follow up appointment-how did PPV go? Goals        Attends follow-up appointments as directed      Educate and encourage importance of FU for prevention of complications or disease;  Assess the patient's relationship with a PCP and next FU visit scheduled; Discuss importance of adherence to treatment plan and follow up visits; Identify any barriers in transportation or access to FU appointments. Assist patient with making FU appointments as needed;   It's also a good idea to know your test results. Keep a list of the medicines you take. Patient will identify signs and symptoms requiring evaluation and intervention      Demonstrate how client is to evaluate contraction activity after discharge (e.g., lying down, tilted to the side with a pillow to the back, placing fingertips on the fundus for approximately 1 hour to note hardening or tightening of the uterus). Identify signs and/or symptoms that should be reported immediately to the healthcare provider (e.g, sustained uterine contractions, clear drainage from vagina, bleeding). Provide information about follow-up care when client is discharged. Advise client to empty bladder every two (2) hours while awake  Review daily fluid need; avoid coffee.   Encourage regular rest periods 2-3 times a day in side-lying position. If bedrest is to be continued after discharge, suggest client spend part of day on couch or recliner. Assist patient to identify any risky behaviors and support change;     Red flags maternity  Call 911 anytime you think you may need emergency care. For example, call if:  Call your doctor now or seek immediate medical care if:     You passed out (lost consciousness). You have a seizure. You have severe vaginal bleeding. You have severe pain in your belly or pelvis. You have had fluid gushing or leaking from your vagina and you know or think the  umbilical cord is bulging into your vagina. If this happens, immediately get down on  your knees so your rear end (buttocks) is higher than your head. This will decrease  the pressure on the cord until help arrives. You have signs of preeclampsia, such as:  Sudden swelling of your face, hands, or feet. New vision problems (such as dimness, blurring, or seeing spots). A severe headache. You have any vaginal bleeding. You have belly pain or cramping. You have a fever. You have had regular contractions (with or without pain) for an hour. This means that  you have 8 or more within 1 hour or 4 or more in 20 minutes after you change your  position and drink fluids. You have a sudden release of fluid from your vagina. You have low back pain or pelvic pressure that does not go away. You notice that your baby has stopped moving or is moving much less than normal.            Summary Note: Today patient states that she is doing well without any chief complaint. Patient is now 2 weeks PP following a c/section delivery. Her lochia is minimal to none. She denies chest pain, shortness of breath, headache, lightheadedness, and blurred vision. She is breast feeding and she denies any signs or symptoms of mastitis. She is currently weaning from BF and is almost done weaning. She is ambulating well with no concerns regarding ADL's.  She is voiding without difficulty. She currently denies s/s of postpartum depression. Bowel movement and flatus are present and normal. She is tolerating solids with no concerns related to nutrition noted. Counseling related to secondary smoke risks and Sudden Infant Death Syndrome (SIDS) were reviewed. Infant sleeping, \"back to sleep\" and avoidance of co-sleeping recommendations were reviewed. Signs and symptoms of Post-Partum Depression were reviewed. The patient will call OBGYN provider or ACM if any occur. Signs and symptoms of Mastitis were reviewed. The patient is to call OBGYN provider or ACM if any occur. Discharge instructions reviewed, instructions include pelvic rest, incision care, weight restrictions and driving restrictions (no driving with pain medicine). Patient's PPV is scheduled at 6 weeks PP. She denies any red-flag s/s or needs r/t DME, medications or appointments. She is agreeable to a follow-up call with ACM in 4 weeks and will call with any needs in the meantime. MIMI Fajardo, RN  Associate Care Manager   Cell: [phone]  [email or web address]

## 2022-08-31 ENCOUNTER — POSTPARTUM VISIT (OUTPATIENT)
Dept: OBGYN CLINIC | Age: 32
End: 2022-08-31

## 2022-08-31 VITALS — SYSTOLIC BLOOD PRESSURE: 118 MMHG | WEIGHT: 196 LBS | DIASTOLIC BLOOD PRESSURE: 80 MMHG | BODY MASS INDEX: 33.64 KG/M2

## 2022-08-31 DIAGNOSIS — Z30.41 ORAL CONTRACEPTIVE PILL SURVEILLANCE: ICD-10-CM

## 2022-08-31 PROCEDURE — 0503F POSTPARTUM CARE VISIT: CPT | Performed by: STUDENT IN AN ORGANIZED HEALTH CARE EDUCATION/TRAINING PROGRAM

## 2022-08-31 RX ORDER — NORETHINDRONE ACETATE AND ETHINYL ESTRADIOL 1MG-20(21)
KIT ORAL
Qty: 3 PACKET | Refills: 3 | Status: SHIPPED | OUTPATIENT
Start: 2022-08-31

## 2022-08-31 NOTE — PROGRESS NOTES
Priority: Medium    Intraoperative hemorrhage 07/26/2022     QBL 1092 mL  Hgb 12.4>7.7       Return in about 6 months (around 2/28/2023) for Annual.    Counseling Completed:  Family planning counseling and STD counseling discussed  Discontinue with postpartum restrictions  Can resume heavy lifting and [address]leen [address], DO [facility]   8/31/2022, 9:50 AM

## 2022-09-08 ENCOUNTER — CARE COORDINATION (OUTPATIENT)
Dept: OTHER | Facility: CLINIC | Age: 32
End: 2022-09-08

## 2022-09-08 NOTE — CARE COORDINATION
ACM attempted to reach patient for PP follow-up call. HIPAA compliant message left requesting a return phone call. Will attempt to outreach patient again. MIMI Steve, RN  Associate Care Manager   Cell: [phone]  [email or web address]

## 2022-09-16 ENCOUNTER — CARE COORDINATION (OUTPATIENT)
Dept: OTHER | Facility: CLINIC | Age: 32
End: 2022-09-16

## 2022-09-16 NOTE — CARE COORDINATION
ACM attempted 2nd outreach to reach patient for PP follow-up call. HIPAA compliant message left requesting a return phone call at patients convenience. Unable to Reach Letter sent to patient via [employer]. Will continue to outreach patient. MIMI Dawson, RN  Associate Care Manager   Cell: [phone]  [email or web address]

## 2022-09-30 ENCOUNTER — CARE COORDINATION (OUTPATIENT)
Dept: OTHER | Facility: CLINIC | Age: 32
End: 2022-09-30

## 2022-09-30 NOTE — CARE COORDINATION
ACM attempted third and final call to patient for introduction to Associate Care Management r/t PP follow-up call. HIPAA compliant message left requesting a return phone call at patients convenience. No further outreach scheduled with this ACM, ACM will sign off care team at this time. Patient has been provided with this ACM's contact information. MIMI Luke, RN  Associate Care Manager   Cell: [phone]  [email or web address]

## 2023-02-28 ENCOUNTER — OFFICE VISIT (OUTPATIENT)
Dept: OBGYN CLINIC | Age: 33
End: 2023-02-28
Payer: COMMERCIAL

## 2023-02-28 VITALS
DIASTOLIC BLOOD PRESSURE: 80 MMHG | HEIGHT: 64 IN | BODY MASS INDEX: 35.17 KG/M2 | SYSTOLIC BLOOD PRESSURE: 126 MMHG | WEIGHT: 206 LBS

## 2023-02-28 DIAGNOSIS — Z01.419 ENCOUNTER FOR WELL WOMAN EXAM WITH ROUTINE GYNECOLOGICAL EXAM: Primary | ICD-10-CM

## 2023-02-28 PROCEDURE — 99395 PREV VISIT EST AGE 18-39: CPT | Performed by: OBSTETRICS & GYNECOLOGY

## 2023-02-28 RX ORDER — NORETHINDRONE ACETATE AND ETHINYL ESTRADIOL 1.5-30(21)
1 KIT ORAL DAILY
Qty: 1 PACKET | Refills: 12 | Status: SHIPPED | OUTPATIENT
Start: 2023-02-28

## 2023-02-28 ASSESSMENT — ENCOUNTER SYMPTOMS
COUGH: 0
BACK PAIN: 0
SHORTNESS OF BREATH: 0
ABDOMINAL PAIN: 0

## 2023-02-28 NOTE — PROGRESS NOTES
[facility]  [address]  [place] [address]  Dept: [phone]    Chief complaint:   Chief Complaint   Patient presents with    Annual Exam     Here for annual last pap 21neg by Elizabeth        History Present Illness: Adrienne is a 33 yo female who presents for her annual exam.  She is having periods every 15 days.  She is on OCPs and wants to stay on OCPs, but would like to have normal length cycles.  She says her periods are very light.  She is sexually active with her  and denies any dyspareunia.  She denies any vaginal discharge or irritation.  She denies any pelvic pain.  She denies any bowel or bladder issues.     Current Medications (OTC/Herbal):   Current Outpatient Medications   Medication Sig Dispense Refill    norethindrone-ethinyl estradiol-iron (LOESTRIN FE 1.5/30) 1.5-30 MG-MCG tablet Take 1 tablet by mouth daily 1 packet 12    norethindrone-ethinyl estradiol (TARINA FE 1/20 EQ) 1-20 MG-MCG per tablet TAKE 1 TABLET BY MOUTH ONE TIME A DAY 3 packet 3    ferrous sulfate (FE TABS) 325 (65 Fe) MG EC tablet Take 1 tablet by mouth in the morning and 1 tablet before bedtime. (Patient not taking: Reported on 2023) 90 tablet 3    Prenatal Vit-Fe Fumarate-FA (PRENATAL PO) Take by mouth (Patient not taking: Reported on 2023)       No current facility-administered medications for this visit.     Allergies: No Known Allergies  Past Medical History:   Past Medical History:   Diagnosis Date    Asthma     as child    Seasonal allergies     Stress incontinence     pregnancy     Past Surgical History:   Past Surgical History:   Procedure Laterality Date     SECTION N/A 2022     SECTION performed by Kayla Alexander MD at [facility] L[facility]D OR    WISDOM TOOTH EXTRACTION       Obstetric History:   1  Para 1  Gynecologic History: LMP  21   Menarche 10  Duration 2-3 d    Interval q   15 d  Tampons/Pads in a day: 2-3  Last Pap: 3/4/21       Any history of abnormal paps no    PriorColpo/Biopsy n/a     Last Mammogram n/a  Contraception: OCPs  Complications: none  STDs: none  Psychosocial History: Occupation:   [employer], works from home   Caffeine Yes, soda    At risk for depression No    Abuse:   No  Seatbelt:   Yes  Exercise:  Yes    Social History     Socioeconomic History    Marital status: Single     Spouse name: Not on file    Number of children: Not on file    Years of education: Not on file    Highest education level: Not on file   Occupational History    Not on file   Tobacco Use    Smoking status: Never    Smokeless tobacco: Never   Vaping Use    Vaping Use: Never used   Substance and Sexual Activity    Alcohol use: Not Currently    Drug use: No    Sexual activity: Yes     Partners: Male   Other Topics Concern    Not on file   Social History Narrative    Not on file     Social Determinants of Health     Financial Resource Strain: Not on file   Food Insecurity: Not on file   Transportation Needs: Not on file   Physical Activity: Not on file   Stress: Not on file   Social Connections: Not on file   Intimate Partner Violence: Not on file   Housing Stability: Not on file       Family History   Problem Relation Age of Onset    High Cholesterol Mother     High Cholesterol Father     Hypertension Father 39    No Known Problems Brother     Alzheimer's Disease Maternal Grandmother     Heart Attack Maternal Grandfather 43    No Known Problems Paternal Grandmother     Hypotension Paternal Grandfather        Review of Systems:   Review of Systems   Constitutional:  Negative for chills and fever. HENT:  Negative for congestion. Respiratory:  Negative for cough and shortness of breath. Cardiovascular:  Negative for chest pain and palpitations. Gastrointestinal:  Negative for abdominal pain. Genitourinary:  Negative for dyspareunia, pelvic pain and vaginal discharge.    Musculoskeletal: Negative for back pain. Neurological:  Negative for dizziness and light-headedness. Psychiatric/Behavioral:  The patient is not nervous/anxious. Physical exam:  vitals:  Height   5  ft    4 in,  Weight    206 lbs,   126/80 BP  Gen: alert, no apparent distress  HEENT:No pathologic skin lesions noted,NC/AT,PERRL, normal midline nontender thyroid   Lung Exam: Clear to auscultation in all fields bilaterally, without wheezes,rales or rhonchi. Cardiac Exam: Normal sinus rhythm andrate, without murmurs, rubs or gallops appreciated. Breast Exam: Symmetric without pathological skin changes, nontender without discrete suspicious masses palpated, supraclavicular or axillary adenopathy or nipple discharge noted. Abdominal Exam: Nontender to deep palpation without organomegaly, masses or CVAT appreciated, BS positive. No spinal deformation or tenderness. External Genitalia: Normal development without vulvar,vaginal or cervical lesions noted. Normal vaginal discharge, uterus anterior, 6 weeks without CMT. Adnexa nontender without abnormal masses bilaterally. Rectal Exam: Omitted. Extremities: Nontender without clubbing, cyanosis or edema. F.R.O.M. Neurologic Exam: Grossly intact without noted sensorimotor deficits and oriented x 3. Assessment/Plan:   Unremarkable annual Gyn exam.    Cervical Cytology Evaluation begins at 24years old. If Negative Cytology, Follow-up screening per current guidelines. Mammograms every 1year. If 37 yo and last mammogram was negative. Hereditary Breast, Ovarian, Colon and Uterine Cancer screening done. Calcium and Vitamin D dosing reviewed. Breakthrough bleeding - Rx increased from 1/20 to 1.5/30  Colonoscopy screening reviewed as well as onset for bone density testing. Birth control and barrier recommendations discussed. STD counseling and prevention reviewed. Gardisil counseling completed for all patients 10-37 yo. Routine health maintenance per patients PCP.   Pt to follow up for annual exam in 1 year    Kofi Osman MD  [address]

## 2023-11-27 ENCOUNTER — HOSPITAL ENCOUNTER (EMERGENCY)
Facility: CLINIC | Age: 33
Discharge: HOME | End: 2023-11-27
Attending: EMERGENCY MEDICINE
Payer: COMMERCIAL

## 2023-11-27 ENCOUNTER — NURSE TRIAGE (OUTPATIENT)
Dept: OTHER | Facility: CLINIC | Age: 33
End: 2023-11-27

## 2023-11-27 VITALS
HEIGHT: 64 IN | TEMPERATURE: 98.7 F | HEART RATE: 76 BPM | BODY MASS INDEX: 34.15 KG/M2 | DIASTOLIC BLOOD PRESSURE: 90 MMHG | RESPIRATION RATE: 16 BRPM | WEIGHT: 200 LBS | OXYGEN SATURATION: 100 % | SYSTOLIC BLOOD PRESSURE: 129 MMHG

## 2023-11-27 DIAGNOSIS — S61.011A LACERATION OF RIGHT THUMB WITHOUT FOREIGN BODY WITHOUT DAMAGE TO NAIL, INITIAL ENCOUNTER: Primary | ICD-10-CM

## 2023-11-27 PROCEDURE — 99282 EMERGENCY DEPT VISIT SF MDM: CPT

## 2023-11-27 PROCEDURE — 12001 RPR S/N/AX/GEN/TRNK 2.5CM/<: CPT

## 2023-11-27 ASSESSMENT — PAIN DESCRIPTION - LOCATION: LOCATION: FINGER (COMMENT WHICH ONE)

## 2023-11-27 ASSESSMENT — PAIN DESCRIPTION - DESCRIPTORS: DESCRIPTORS: OTHER (COMMENT)

## 2023-11-27 ASSESSMENT — LIFESTYLE VARIABLES
HOW OFTEN DO YOU HAVE A DRINK CONTAINING ALCOHOL: NEVER
HOW MANY STANDARD DRINKS CONTAINING ALCOHOL DO YOU HAVE ON A TYPICAL DAY: PATIENT DOES NOT DRINK

## 2023-11-27 ASSESSMENT — PAIN DESCRIPTION - ORIENTATION: ORIENTATION: RIGHT

## 2023-11-27 ASSESSMENT — PAIN SCALES - GENERAL: PAINLEVEL_OUTOF10: 2

## 2023-11-27 NOTE — TELEPHONE ENCOUNTER
Location of patient: [place]    Subjective: Caller states \"I cut my right thumb on a can and it won't stop bleeding\"     Current Symptoms: Cut on right thumb - cut in on the pad - deep cut - cut about 3/4\", Won't stop bleeding has been for about 20 minutes, mild pain    Onset: 20 minutes ago     Associated Symptoms: NA    Temperature: un-assessed       What has been tried: pressure for 20 minutes    LMP: NA Pregnant: Unknown    Recommended disposition: Go to ED Now    Care advice provided, patient verbalizes understanding; denies any other questions or concerns; instructed to call back for any new or worsening symptoms. Patient/caller agrees to proceed to nearest Emergency Department    Attention Provider: Thank you for allowing me to participate in the care of your patient. The patient was connected to triage in response to symptoms provided. Please do not respond through this encounter as the response is not directed to a shared pool.       Reason for Disposition   [1] Bleeding AND [2] won't stop after 10 minutes of direct pressure (using correct technique)    Protocols used: Finger Injury-ADULT-

## 2023-11-28 NOTE — ED PROVIDER NOTES
[address] [facility] ED  eMERGENCY dEPARTMENT eNCOUnter      Pt Name: José Miguel Estrella  MRN: [id number]  [address] [facility] 1990  Date of evaluation: 2023  Provider: Chano Avendaño PA-C    CHIEF COMPLAINT       Chief Complaint   Patient presents with    Laceration     Onset 441 0134 at home opening a can           HISTORY OF PRESENT ILLNESS  (Location/Symptom, Timing/Onset, Context/Setting, Quality, Duration, Modifying Factors, Severity.)   José Miguel Estrella is a 26 y.o. female who presents to the emergency department c/o laceration to right thumb. States they were at home opening can of corn and caught edge. Denies any numbness or tingling. Denies any other complaints. Up to date on tetanus: yes       Nursing Notes were reviewed. REVIEW OF SYSTEMS    (2-9 systems for level 4, 10 or more for level 5)     Review of Systems   Laceration to right thumb    Except as noted above the remainder of the review of systems was reviewed and negative. PAST MEDICAL HISTORY     Past Medical History:   Diagnosis Date    Asthma     as child    Seasonal allergies     Stress incontinence     pregnancy     None otherwise stated in nurses notes    SURGICAL HISTORY       Past Surgical History:   Procedure Laterality Date     SECTION N/A 2022     SECTION performed by Nolan Sweeney MD at [facility] L&D OR    WISDOM TOOTH EXTRACTION       None otherwise stated in nurses notes    CURRENT MEDICATIONS       Previous Medications    FERROUS SULFATE (FE TABS) 325 (65 FE) MG EC TABLET    Take 1 tablet by mouth in the morning and 1 tablet before bedtime. NORETHINDRONE-ETHINYL ESTRADIOL (TARINA FE 1/20 EQ) 1-20 MG-MCG PER TABLET    TAKE 1 TABLET BY MOUTH ONE TIME A DAY    NORETHINDRONE-ETHINYL ESTRADIOL-IRON (LOESTRIN FE 1.5/30) 1.5-30 MG-MCG TABLET    Take 1 tablet by mouth daily    PRENATAL VIT-FE FUMARATE-FA (PRENATAL PO)    Take by mouth       ALLERGIES     Patient has no known allergies.     FAMILY HISTORY Doxycycline Counseling:  Patient counseled regarding possible photosensitivity and increased risk for sunburn.  Patient instructed to avoid sunlight, if possible.  When exposed to sunlight, patients should wear protective clothing, sunglasses, and sunscreen.  The patient was instructed to call the office immediately if the following severe adverse effects occur:  hearing changes, easy bruising/bleeding, severe headache, or vision changes.  The patient verbalized understanding of the proper use and possible adverse effects of doxycycline.  All of the patient's questions and concerns were addressed.

## 2023-11-28 NOTE — ED TRIAGE NOTES
Laceration to right thumb onset about 1730 today at home. Cut thumb while opening a can. Laceration is just over 1 cm. Bleeding is controlled. Good PMS noted.

## 2024-01-26 ENCOUNTER — OFFICE VISIT (OUTPATIENT)
Dept: OBGYN CLINIC | Age: 34
End: 2024-01-26

## 2024-01-26 ENCOUNTER — HOSPITAL ENCOUNTER (OUTPATIENT)
Age: 34
Setting detail: SPECIMEN
Discharge: HOME | End: 2024-01-26

## 2024-01-26 VITALS
SYSTOLIC BLOOD PRESSURE: 110 MMHG | WEIGHT: 197 LBS | HEIGHT: 64 IN | BODY MASS INDEX: 33.63 KG/M2 | DIASTOLIC BLOOD PRESSURE: 78 MMHG

## 2024-01-26 DIAGNOSIS — Z32.01 POSITIVE PREGNANCY TEST: Primary | ICD-10-CM

## 2024-01-26 DIAGNOSIS — Z32.01 POSITIVE PREGNANCY TEST: ICD-10-CM

## 2024-01-26 DIAGNOSIS — Z3A.08 8 WEEKS GESTATION OF PREGNANCY: ICD-10-CM

## 2024-01-26 LAB
AMPHET UR QL SCN: NEGATIVE
BARBITURATES UR QL SCN: NEGATIVE
BENZODIAZ UR QL: NEGATIVE
C TRACH DNA SPEC QL PROBE+SIG AMP: NORMAL
CANNABINOIDS UR QL SCN: NEGATIVE
COCAINE UR QL SCN: NEGATIVE
FENTANYL UR QL: NEGATIVE
METHADONE UR QL: NEGATIVE
N GONORRHOEA DNA SPEC QL PROBE+SIG AMP: NORMAL
OPIATES UR QL SCN: NEGATIVE
OXYCODONE UR QL SCN: NEGATIVE
PCP UR QL SCN: NEGATIVE
SPECIMEN DESCRIPTION: NORMAL
TEST INFORMATION: NORMAL

## 2024-01-26 NOTE — PROGRESS NOTES
Chaperone for Intimate Exam  Chaperone was offered as part of the rooming process. Patient declined and agrees to continue with exam without a chaperone.  Chaperone: NONE       
VISIT EVALUATION:  The patient was seen full history and physical was completed/reviewed. Cytology was collected for patients over 21 years of age.Cultures were collected.    The patient was counseled on the risks of tobacco abuse. Both maternal and fetal. She was instructed to stop smoking if currently using tobacco. Morbidity, mortality, and cessation programs were reviewed. The risks include but are not limited to increased risks of  labor,  delivery, premature rupture of membranes, intrauterine growth restriction, intrauterine fetal demise and abruptio placenta. Secondary smoke risks were also reviewed. Increases in cancer, respiratory problems, and sudden infant death syndrome were reviewed as well.    Prenatal screening was discussed, including cell free DNA tests. Benefits of the above testing was reviewed. Risks, Benefits and non-invasive alternative testing was reviewed.       Upon completion of the visit all questions were answered and the patients follow-up and testing schedule were reviewed.      LENNY Moreno - CNP  [facility]   2024, 1:50 PM

## 2024-01-27 LAB
AMORPH SED URNS QL MICRO: ABNORMAL
BACTERIA URNS QL MICRO: ABNORMAL
BILIRUB UR QL STRIP: NEGATIVE
CANDIDA SPECIES: NEGATIVE
CASTS #/AREA URNS LPF: ABNORMAL /LPF (ref 0–8)
CLARITY UR: CLEAR
COLOR UR: YELLOW
EPI CELLS #/AREA URNS HPF: ABNORMAL /HPF (ref 0–5)
GARDNERELLA VAGINALIS: NEGATIVE
GLUCOSE UR STRIP-MCNC: NEGATIVE MG/DL
HGB UR QL STRIP.AUTO: NEGATIVE
KETONES UR STRIP-MCNC: NEGATIVE MG/DL
LEUKOCYTE ESTERASE UR QL STRIP: NEGATIVE
MICROORGANISM SPEC CULT: NORMAL
NITRITE UR QL STRIP: NEGATIVE
PH UR STRIP: 8 [PH] (ref 5–8)
PROT UR STRIP-MCNC: NEGATIVE MG/DL
RBC #/AREA URNS HPF: ABNORMAL /HPF (ref 0–4)
SOURCE: NORMAL
SP GR UR STRIP: 1.01 (ref 1–1.03)
SPECIMEN DESCRIPTION: NORMAL
TRICHOMONAS: NEGATIVE
UROBILINOGEN UR STRIP-ACNC: NORMAL EU/DL (ref 0–1)
WBC #/AREA URNS HPF: ABNORMAL /HPF (ref 0–5)

## 2024-01-29 LAB
C TRACH DNA SPEC QL PROBE+SIG AMP: NEGATIVE
N GONORRHOEA DNA SPEC QL PROBE+SIG AMP: NEGATIVE
SPECIMEN DESCRIPTION: NORMAL

## 2024-01-30 LAB
HPV I/H RISK 4 DNA CVX QL NAA+PROBE: NOT DETECTED
HPV SAMPLE: NORMAL
HPV, INTERPRETATION: NORMAL
HPV16 DNA CVX QL NAA+PROBE: NOT DETECTED
HPV18 DNA CVX QL NAA+PROBE: NOT DETECTED
SPECIMEN DESCRIPTION: NORMAL

## 2024-02-09 ENCOUNTER — HOSPITAL ENCOUNTER (OUTPATIENT)
Age: 34
Setting detail: SPECIMEN
Discharge: HOME | End: 2024-02-09

## 2024-02-09 ENCOUNTER — INITIAL PRENATAL (OUTPATIENT)
Dept: OBGYN CLINIC | Age: 34
End: 2024-02-09

## 2024-02-09 VITALS
SYSTOLIC BLOOD PRESSURE: 102 MMHG | DIASTOLIC BLOOD PRESSURE: 68 MMHG | HEIGHT: 64 IN | WEIGHT: 197 LBS | BODY MASS INDEX: 33.63 KG/M2

## 2024-02-09 DIAGNOSIS — O09.521 ADVANCED MATERNAL AGE IN MULTIGRAVIDA, FIRST TRIMESTER: ICD-10-CM

## 2024-02-09 DIAGNOSIS — Z3A.08 8 WEEKS GESTATION OF PREGNANCY: ICD-10-CM

## 2024-02-09 DIAGNOSIS — O34.219 PREGNANCY WITH HISTORY OF CESAREAN SECTION, ANTEPARTUM: ICD-10-CM

## 2024-02-09 DIAGNOSIS — Z87.09 HISTORY OF ASTHMA: ICD-10-CM

## 2024-02-09 DIAGNOSIS — Z32.01 POSITIVE PREGNANCY TEST: ICD-10-CM

## 2024-02-09 LAB
ABO + RH BLD: NORMAL
BASOPHILS # BLD: 0.03 K/UL (ref 0–0.2)
BASOPHILS NFR BLD: 0 % (ref 0–2)
BLOOD GROUP ANTIBODIES SERPL: NEGATIVE
EOSINOPHIL # BLD: 0.19 K/UL (ref 0–0.44)
EOSINOPHILS RELATIVE PERCENT: 2 % (ref 1–4)
ERYTHROCYTE [DISTWIDTH] IN BLOOD BY AUTOMATED COUNT: 12.7 % (ref 11.8–14.4)
HBV SURFACE AG SERPL QL IA: NONREACTIVE
HCT VFR BLD AUTO: 40.1 % (ref 36.3–47.1)
HCV AB SERPL QL IA: NONREACTIVE
HGB BLD-MCNC: 13.3 G/DL (ref 11.9–15.1)
HIV 1+2 AB+HIV1 P24 AG SERPL QL IA: NONREACTIVE
IMM GRANULOCYTES # BLD AUTO: 0.03 K/UL (ref 0–0.3)
IMM GRANULOCYTES NFR BLD: 0 %
LYMPHOCYTES NFR BLD: 2.04 K/UL (ref 1.1–3.7)
LYMPHOCYTES RELATIVE PERCENT: 25 % (ref 24–43)
MCH RBC QN AUTO: 28.2 PG (ref 25.2–33.5)
MCHC RBC AUTO-ENTMCNC: 33.2 G/DL (ref 28.4–34.8)
MCV RBC AUTO: 85 FL (ref 82.6–102.9)
MONOCYTES NFR BLD: 0.53 K/UL (ref 0.1–1.2)
MONOCYTES NFR BLD: 6 % (ref 3–12)
NEUTROPHILS NFR BLD: 67 % (ref 36–65)
NEUTS SEG NFR BLD: 5.51 K/UL (ref 1.5–8.1)
NRBC BLD-RTO: 0 PER 100 WBC
PLATELET # BLD AUTO: 271 K/UL (ref 138–453)
PMV BLD AUTO: 9.5 FL (ref 8.1–13.5)
RBC # BLD AUTO: 4.72 M/UL (ref 3.95–5.11)
RUBV IGG SERPL QL IA: 130.8 IU/ML
T PALLIDUM AB SER QL IA: NONREACTIVE
WBC OTHER # BLD: 8.3 K/UL (ref 3.5–11.3)

## 2024-02-09 NOTE — PROGRESS NOTES
Past Surgical History:   Procedure Laterality Date     SECTION N/A 2022     SECTION performed by Kayla Alexander MD at [facility] L&D OR    WISDOM TOOTH EXTRACTION       Family History   Problem Relation Age of Onset    High Cholesterol Mother     High Cholesterol Father     Hypertension Father 45    No Known Problems Brother     Alzheimer's Disease Maternal Grandmother     Heart Attack Maternal Grandfather 42    No Known Problems Paternal Grandmother     Hypotension Paternal Grandfather      Social History     Tobacco Use   Smoking Status Never   Smokeless Tobacco Never     Social History     Substance and Sexual Activity   Alcohol Use Not Currently       MEDICATIONS:  Current Outpatient Medications   Medication Sig Dispense Refill    Prenatal Vit-Fe Fumarate-FA (PRENATAL PO) Take by mouth       No current facility-administered medications for this visit.       ALLERGIES:  Patient has no known allergies.    Reviewed global and practice OB care including nausea measures, nutrition, activities, warning signs, and contact information. Offered cell free DNA screen,NT echo and WIC .    `--------------------------------------------------------------------------  Genetic Screening/Teratology Counseling  (Include patient, FOB or anyone in either family)    1) Patient's age 35 years or > at [place]: Yes  2) Thalassemia (Mediterranean, ): No  3) Neural Tube Defect:   No  4) Congenital heart defect:   No  5) Trisomy (e.g. Down Syndrome):  No  6) El-sachs (Yaz[place]ism, [place] citizen, Cajun): No  7) Multiple Births:    No  8) Sickle cell (disease or trait):  No  9) Hemophilia or blood disorders:  No  10) Muscular Dystrophy:   No  11) Cystic Fibrosis:    No  12) Dalton's chorea:   No  13) Mental retardation/Autism :  No   If yes, was person tested for fragile X: No  14) Other inherited genetic/chromosomal disorder: No  15) Maternal metabolic disorder (DM,

## 2024-02-27 LAB — CYTOLOGY REPORT: NORMAL

## 2024-03-02 NOTE — PROGRESS NOTES
Adrienne is a  @ 14w2d who presents for GOOD visit.  She denies LOF, VB or Ctxs.  + FM.  She says her nausea is much better than it was.  She denies any fevers/chills, SOB, cough, sore throat, loss of taste/smell or sick contacts.  Pt denies any HA, vision changes or RUQ pain.     O:  Vitals:    24 0943   BP: 115/72   Pulse: 68     Gen: NAD  Abd: soft, nontender, gravid  Ext:  no edema      BP: 115/72  Weight - Scale: 89.4 kg (197 lb)  Pulse: 68  Patient Position: Sitting  Fetal HR: 155  Movement: Present    A/P:  Patient Active Problem List    Diagnosis Date Noted    Childhood Asthma 2022     Priority: Medium    BMI 36.0-36.9,adult 2022     Priority: Medium    PLTCS 22 M Apg 8/9 Wt 9#0 2022     Priority: Medium    Intraoperative hemorrhage 2022     QBL 1092 mL  Hgb 12.4>7.7       - Discussed updated COVID precautions and policies. Reviewed updated visitor policy. Encouraged social distancing and appropriate hand washing/hygiene practices. Reviewed symptoms suspicious for COVID infection. Discussed that ACOG, SMFM, and the CDC recommend to not withold immunization in pregnant and breastfeeding women who meet criteria for receipt of the vaccine based on the ACIP recommended priority groups. All questions answered. Patient vocalized understanding.    - RSV vaccination (32-36 weeks): The patient was counseled on benefits to her baby if she receives the Pfizer RSV vaccine (Abrysvo) during pregnancy. She was informed that this vaccination is FDA approved for use during pregnancy. She will think about it and call local pharmacies       Discussed s/sx that should prompt call to the office  Discussed kick counts  Pt counseled to continue PNVs  RTC in 4 wks    Kayla Alexander MD

## 2024-03-05 ENCOUNTER — ROUTINE PRENATAL (OUTPATIENT)
Dept: OBGYN CLINIC | Age: 34
End: 2024-03-05

## 2024-03-05 VITALS
BODY MASS INDEX: 33.81 KG/M2 | WEIGHT: 197 LBS | HEART RATE: 68 BPM | DIASTOLIC BLOOD PRESSURE: 72 MMHG | SYSTOLIC BLOOD PRESSURE: 115 MMHG

## 2024-03-05 DIAGNOSIS — Z3A.14 14 WEEKS GESTATION OF PREGNANCY: Primary | ICD-10-CM

## 2024-03-05 DIAGNOSIS — Z34.02 SUPERVISION OF NORMAL FIRST PREGNANCY IN SECOND TRIMESTER: ICD-10-CM

## 2024-03-05 PROCEDURE — 0502F SUBSEQUENT PRENATAL CARE: CPT | Performed by: OBSTETRICS & GYNECOLOGY

## 2024-04-01 NOTE — PROGRESS NOTES
Adrienne is a  @ 18w3d who presents for GOOD visit.  She denies LOF, VB or Ctxs.  - FM.  She denies any complaints.  She denies any fevers/chills, SOB, cough, sore throat, loss of taste/smell or sick contacts.  Pt denies any HA, vision changes or RUQ pain.     O:  Vitals:    24 0850   BP: 121/75   Pulse: 78     Gen: NAD  Abd: soft, nontender, gravid  Ext:  no edema      BP: 121/75  Weight - Scale: 91.6 kg (202 lb)  Pulse: 78  Patient Position: Sitting  Fetal HR: 150  Movement: Absent    A/P:  Patient Active Problem List    Diagnosis Date Noted    Childhood Asthma 2022     Priority: Medium    BMI 36.0-36.9,adult 2022     Priority: Medium    PLTCS 22 M Apg 8/9 Wt 9#0 2022     Priority: Medium    Intraoperative hemorrhage 2022     QBL 1092 mL  Hgb 12.4>7.7       - Discussed updated COVID precautions and policies. Reviewed updated visitor policy. Encouraged social distancing and appropriate hand washing/hygiene practices. Reviewed symptoms suspicious for COVID infection. Discussed that ACOG, SMFM, and the CDC recommend to not withold immunization in pregnant and breastfeeding women who meet criteria for receipt of the vaccine based on the ACIP recommended priority groups. All questions answered. Patient vocalized understanding.    - RSV vaccination (32-36 weeks): The patient was counseled on benefits to her baby if she receives the Pfizer RSV vaccine (Abrysvo) during pregnancy. She was informed that this vaccination is FDA approved for use during pregnancy. She will think about it and call local pharmacies       Anatomy scan with M on   Discussed s/sx that should prompt call to the office  Discussed kick counts  Pt counseled to continue PNVs  RTC in 4 wks    Kayla Alexander MD

## 2024-04-02 ENCOUNTER — ROUTINE PRENATAL (OUTPATIENT)
Dept: OBGYN CLINIC | Age: 34
End: 2024-04-02

## 2024-04-02 VITALS
DIASTOLIC BLOOD PRESSURE: 75 MMHG | SYSTOLIC BLOOD PRESSURE: 121 MMHG | BODY MASS INDEX: 34.67 KG/M2 | HEART RATE: 78 BPM | WEIGHT: 202 LBS

## 2024-04-02 DIAGNOSIS — Z3A.18 18 WEEKS GESTATION OF PREGNANCY: Primary | ICD-10-CM

## 2024-04-02 DIAGNOSIS — O09.92 SUPERVISION OF HIGH RISK PREGNANCY IN SECOND TRIMESTER: ICD-10-CM

## 2024-04-02 PROCEDURE — 0502F SUBSEQUENT PRENATAL CARE: CPT | Performed by: OBSTETRICS & GYNECOLOGY

## 2024-04-16 ENCOUNTER — ROUTINE PRENATAL (OUTPATIENT)
Dept: PERINATAL CARE | Age: 34
End: 2024-04-16
Payer: COMMERCIAL

## 2024-04-16 VITALS
TEMPERATURE: 97.2 F | BODY MASS INDEX: 34.66 KG/M2 | RESPIRATION RATE: 16 BRPM | HEIGHT: 64 IN | SYSTOLIC BLOOD PRESSURE: 127 MMHG | WEIGHT: 203.04 LBS | DIASTOLIC BLOOD PRESSURE: 72 MMHG | HEART RATE: 76 BPM

## 2024-04-16 DIAGNOSIS — O09.522 MULTIGRAVIDA OF ADVANCED MATERNAL AGE IN SECOND TRIMESTER: Primary | ICD-10-CM

## 2024-04-16 DIAGNOSIS — O09.899 SHORT INTERVAL BETWEEN PREGNANCIES COMPLICATING PREGNANCY, ANTEPARTUM: ICD-10-CM

## 2024-04-16 DIAGNOSIS — O35.2XX0 HEREDITARY FAMILIAL DISEASE AFFECTING MANAGEMENT OF MOTHER AND POSSIBLY AFFECTING FETUS, ANTEPARTUM, SINGLE OR UNSPECIFIED FETUS: ICD-10-CM

## 2024-04-16 DIAGNOSIS — O09.292 HISTORY OF MACROSOMIA IN INFANT IN PRIOR PREGNANCY, CURRENTLY PREGNANT IN SECOND TRIMESTER: ICD-10-CM

## 2024-04-16 DIAGNOSIS — Z3A.20 20 WEEKS GESTATION OF PREGNANCY: ICD-10-CM

## 2024-04-16 DIAGNOSIS — O99.212 OBESITY AFFECTING PREGNANCY IN SECOND TRIMESTER, UNSPECIFIED OBESITY TYPE: ICD-10-CM

## 2024-04-16 DIAGNOSIS — Z36.86 ENCOUNTER FOR SCREENING FOR RISK OF PRE-TERM LABOR: ICD-10-CM

## 2024-04-16 PROCEDURE — 99999 PR OFFICE/OUTPT VISIT,PROCEDURE ONLY: CPT | Performed by: OBSTETRICS & GYNECOLOGY

## 2024-04-16 PROCEDURE — 76811 OB US DETAILED SNGL FETUS: CPT | Performed by: OBSTETRICS & GYNECOLOGY

## 2024-04-16 PROCEDURE — 76817 TRANSVAGINAL US OBSTETRIC: CPT | Performed by: OBSTETRICS & GYNECOLOGY

## 2024-04-16 NOTE — PROGRESS NOTES
Obstetric/Gynecology Maternal Fetal Medicine Resident Note    Discussed with patient consultation with [facility] Attending physician for advanced maternal age and family history of polydactyly (FOB nephew).     Patient declines formal consult with [facility] attending physician for AMA and Family history of polydactyly.     Symone Brenner DO  OBGYSERGEI Resident, PGY1  [facility]  4/16/2024, 10:16 AM

## 2024-04-16 NOTE — PROGRESS NOTES
I would advise initiation of daily oral baby aspirin 81 mg based on guidelines by the USPSTF/ACOG (for preeclampsia prevention for pregnant women at \"high-risk\"  for preeclampsia).        Options with respect to offering the patient invasive genetic prenatal testing, non-invasive prenatal testing (NIPT/NIPS), MSAFP screen, and maternal carrier genetic screening could not be completed today, as the patient declines a Maternal-Fetal Medicine physician consultation today.     Patient declines a Maternal-Fetal Medicine complete physician consultation today regarding the fetal and/or maternal medical/obstetrical complications/co-morbidities of pregnancy.      Maternal-Fetal Medicine (MFM) attending physician will defer all management for these medical/obstetrical complications of pregnancy to the primary attending obstetrical physician/provider, as a result.  Therefore, only an ultrasound evaluation was completed today in the MFM office.      Please refer to Maternal-Fetal Medicine OBGYN resident progress note in EPIC.

## 2024-05-02 ENCOUNTER — ROUTINE PRENATAL (OUTPATIENT)
Dept: OBGYN CLINIC | Age: 34
End: 2024-05-02

## 2024-05-02 VITALS
BODY MASS INDEX: 35.02 KG/M2 | WEIGHT: 204 LBS | SYSTOLIC BLOOD PRESSURE: 106 MMHG | DIASTOLIC BLOOD PRESSURE: 75 MMHG | HEART RATE: 72 BPM

## 2024-05-02 DIAGNOSIS — O09.892 SHORT INTERVAL BETWEEN PREGNANCIES AFFECTING PREGNANCY IN SECOND TRIMESTER, ANTEPARTUM: ICD-10-CM

## 2024-05-02 DIAGNOSIS — Z3A.22 22 WEEKS GESTATION OF PREGNANCY: ICD-10-CM

## 2024-05-02 DIAGNOSIS — R87.610 ATYPICAL SQUAMOUS CELLS OF UNDETERMINED SIGNIFICANCE ON CYTOLOGIC SMEAR OF CERVIX (ASC-US): ICD-10-CM

## 2024-05-02 DIAGNOSIS — O09.92 HIGH-RISK PREGNANCY IN SECOND TRIMESTER: Primary | ICD-10-CM

## 2024-05-02 DIAGNOSIS — O09.522 MULTIGRAVIDA OF ADVANCED MATERNAL AGE IN SECOND TRIMESTER: ICD-10-CM

## 2024-05-02 PROCEDURE — 0502F SUBSEQUENT PRENATAL CARE: CPT | Performed by: STUDENT IN AN ORGANIZED HEALTH CARE EDUCATION/TRAINING PROGRAM

## 2024-05-02 NOTE — PROGRESS NOTES
Prenatal Visit    Adrienne Oliveira is a 33 y.o. female  at 22w5d IUP    The patient was seen and evaluated. She has no complaints today.  Reports positive fetal movements. She denies headache, vision changes, RUQ pain, contractions, vaginal bleeding and leakage of fluid. Things are going well so far. Having no issues with constipation or urination. She does not plan to breast feed this pregnancy. Reports nursing didn't go well last time so she is planning to formula feed.     The problem list reflects the active issues addressed during today's visit    Vitals:  BP: 106/75  Weight - Scale: 92.5 kg (204 lb)  Pulse: 72  Patient Position: Sitting  Fetal HR: 155  Movement: Present     PHYSICAL:   General appearance: no apparent distress, alert and cooperative  HEENT: head atraumatic, normocephalic, trachea midline, moist mucous membranes   Neurologic: alert, oriented, normal speech   Lungs: no increased work of breathing,   Abdomen: soft, gravid, non-tender on palpation    Musculoskeletal: no gross abnormalities, range of motion appropriate for age   Psychiatric: mood appropriate, normal affect      Assessment & Plan:  Adrienne Oliveira is a 33 y.o. female  at 22w5d IUP   - VSS     - Prenatal labs reviewed    - 28 week labs ordered   - Problem list updated    - Genetic testing not done this pregnancy    - The patients anatomy ultrasound has been completed and reviewed with patient. Baby noted to be in breech presentation, will follow fetal positioning closely    - Next [facility] appointment 24   - Continue taking prenatal vitamins QD    - Rhogam not indicated   - Influenza vaccination: done    - S/p COVID-19 vaccination x4  Return in about 4 weeks (around 2024) for GOOD.        Orders Placed This Encounter   Procedures    Culture, Urine     Standing Status:   Future     Standing Expiration Date:   2025     Order Specific Question:   Specify (ex-cath, midstream, cysto, etc)?     Answer:   midstream    CBC

## 2024-05-27 NOTE — PROGRESS NOTES
Adrienne is a  @ 26w3d who presents for GOOD visit.  She denies LOF, VB or Ctxs.  + FM.  She denies any complaints.  She denies any fevers/chills, SOB, cough, sore throat, loss of taste/smell or sick contacts.  Pt denies any HA, vision changes or RUQ pain.     O:  Vitals:    24 0818   BP: 111/75   Pulse: 94     Gen: NAD  Abd: soft, nontender, gravid  Ext:  no edema      BP: 111/75  Weight - Scale: 93.9 kg (207 lb)  Pulse: 94  Patient Position: Sitting  Fundal Height (cm): 26 cm  Fetal HR: 145  Movement: Present    A/P:  Patient Active Problem List    Diagnosis Date Noted    Childhood Asthma 2022     Priority: Medium    BMI 36.0-36.9,adult 2022     Priority: Medium    PLTCS 22 M Apg 8/9 Wt 9#0 2022     Priority: Medium    Abnormal Pap Smear 2024 ASCUS with negative HPV      Breech presentation 2024    Multigravida of advanced maternal age in second trimester 2024    G2- Short interval between pregnancies 2024     Delivered 2022      Intraoperative hemorrhage 2022     QBL 1092 mL  Hgb 12.4>7.7       - Discussed updated COVID precautions and policies. Reviewed updated visitor policy. Encouraged social distancing and appropriate hand washing/hygiene practices. Reviewed symptoms suspicious for COVID infection. Discussed that ACOG, SMFM, and the CDC recommend to not withold immunization in pregnant and breastfeeding women who meet criteria for receipt of the vaccine based on the ACIP recommended priority groups. All questions answered. Patient vocalized understanding.    - RSV vaccination (32-36 weeks): The patient was counseled on benefits to her baby if she receives the Pfizer RSV vaccine (Abrysvo) during pregnancy. She was informed that this vaccination is FDA approved for use during pregnancy. She will think about it and call local pharmacies       Encouraged pt to do labs before next visit  Discussed s/sx that should prompt call to the

## 2024-05-28 ENCOUNTER — ROUTINE PRENATAL (OUTPATIENT)
Dept: OBGYN CLINIC | Age: 34
End: 2024-05-28

## 2024-05-28 VITALS
BODY MASS INDEX: 35.53 KG/M2 | DIASTOLIC BLOOD PRESSURE: 75 MMHG | HEART RATE: 94 BPM | WEIGHT: 207 LBS | SYSTOLIC BLOOD PRESSURE: 111 MMHG

## 2024-05-28 DIAGNOSIS — O09.92 SUPERVISION OF HIGH RISK PREGNANCY IN SECOND TRIMESTER: ICD-10-CM

## 2024-05-28 DIAGNOSIS — Z3A.26 26 WEEKS GESTATION OF PREGNANCY: Primary | ICD-10-CM

## 2024-05-28 PROCEDURE — 0502F SUBSEQUENT PRENATAL CARE: CPT | Performed by: OBSTETRICS & GYNECOLOGY

## 2024-06-02 ENCOUNTER — HOSPITAL ENCOUNTER (OUTPATIENT)
Facility: CLINIC | Age: 34
Discharge: HOME | End: 2024-06-02
Payer: COMMERCIAL

## 2024-06-02 DIAGNOSIS — Z3A.22 22 WEEKS GESTATION OF PREGNANCY: ICD-10-CM

## 2024-06-02 DIAGNOSIS — O09.92 HIGH-RISK PREGNANCY IN SECOND TRIMESTER: ICD-10-CM

## 2024-06-02 LAB
BASOPHILS # BLD: 0.06 K/UL (ref 0–0.2)
BASOPHILS NFR BLD: 1 % (ref 0–2)
EOSINOPHIL # BLD: 0.27 K/UL (ref 0–0.44)
EOSINOPHILS RELATIVE PERCENT: 3 % (ref 1–4)
ERYTHROCYTE [DISTWIDTH] IN BLOOD BY AUTOMATED COUNT: 13.3 % (ref 11.8–14.4)
GLUCOSE 1H P 50 G GLC PO SERPL-MCNC: 145 MG/DL (ref 70–135)
GLUCOSE ADMINISTRATION: ABNORMAL
HCT VFR BLD AUTO: 33.6 % (ref 36.3–47.1)
HGB BLD-MCNC: 11.2 G/DL (ref 11.9–15.1)
IMM GRANULOCYTES # BLD AUTO: 0.18 K/UL (ref 0–0.3)
IMM GRANULOCYTES NFR BLD: 2 %
LYMPHOCYTES NFR BLD: 2.25 K/UL (ref 1.1–3.7)
LYMPHOCYTES RELATIVE PERCENT: 21 % (ref 24–43)
MCH RBC QN AUTO: 29 PG (ref 25.2–33.5)
MCHC RBC AUTO-ENTMCNC: 33.3 G/DL (ref 28.4–34.8)
MCV RBC AUTO: 87 FL (ref 82.6–102.9)
MONOCYTES NFR BLD: 0.55 K/UL (ref 0.1–1.2)
MONOCYTES NFR BLD: 5 % (ref 3–12)
NEUTROPHILS NFR BLD: 69 % (ref 36–65)
NEUTS SEG NFR BLD: 7.33 K/UL (ref 1.5–8.1)
NRBC BLD-RTO: 0 PER 100 WBC
PLATELET # BLD AUTO: ABNORMAL K/UL (ref 138–453)
PLATELET, FLUORESCENCE: 226 K/UL (ref 138–453)
PLATELETS.RETICULATED NFR BLD AUTO: 3 % (ref 1.1–10.3)
RBC # BLD AUTO: 3.86 M/UL (ref 3.95–5.11)
T PALLIDUM AB SER QL IA: NONREACTIVE
WBC OTHER # BLD: 10.6 K/UL (ref 3.5–11.3)

## 2024-06-02 PROCEDURE — 82950 GLUCOSE TEST: CPT

## 2024-06-02 PROCEDURE — 85055 RETICULATED PLATELET ASSAY: CPT

## 2024-06-02 PROCEDURE — 36415 COLL VENOUS BLD VENIPUNCTURE: CPT

## 2024-06-02 PROCEDURE — 86780 TREPONEMA PALLIDUM: CPT

## 2024-06-02 PROCEDURE — 87086 URINE CULTURE/COLONY COUNT: CPT

## 2024-06-02 PROCEDURE — 85025 COMPLETE CBC W/AUTO DIFF WBC: CPT

## 2024-06-03 LAB
MICROORGANISM SPEC CULT: NORMAL
SPECIMEN DESCRIPTION: NORMAL

## 2024-06-05 DIAGNOSIS — O99.810 ABNORMAL GLUCOSE TOLERANCE IN PREGNANCY: Primary | ICD-10-CM

## 2024-06-08 ENCOUNTER — HOSPITAL ENCOUNTER (OUTPATIENT)
Facility: CLINIC | Age: 34
Discharge: HOME | End: 2024-06-08
Payer: COMMERCIAL

## 2024-06-08 DIAGNOSIS — O99.810 ABNORMAL GLUCOSE TOLERANCE IN PREGNANCY: ICD-10-CM

## 2024-06-08 LAB
AMOUNT GLUCOSE GIVEN: 100 G
GLUCOSE 2H P 75 G GLC PO SERPL-MCNC: 90 MG/DL
GLUCOSE TOLERANCE TEST 1 HOUR: 126 MG/DL
GLUCOSE TOLERANCE TEST 2 HOUR: 125 MG/DL
GLUCOSE TOLERANCE TEST 3 HOUR: 87 MG/DL

## 2024-06-08 PROCEDURE — 82952 GTT-ADDED SAMPLES: CPT

## 2024-06-08 PROCEDURE — 82951 GLUCOSE TOLERANCE TEST (GTT): CPT

## 2024-06-08 PROCEDURE — 36415 COLL VENOUS BLD VENIPUNCTURE: CPT

## 2024-06-11 ENCOUNTER — ROUTINE PRENATAL (OUTPATIENT)
Dept: OBGYN CLINIC | Age: 34
End: 2024-06-11
Payer: COMMERCIAL

## 2024-06-11 VITALS
WEIGHT: 209.6 LBS | SYSTOLIC BLOOD PRESSURE: 132 MMHG | BODY MASS INDEX: 35.98 KG/M2 | DIASTOLIC BLOOD PRESSURE: 83 MMHG | HEART RATE: 100 BPM

## 2024-06-11 DIAGNOSIS — O09.93 SUPERVISION OF HIGH RISK PREGNANCY IN THIRD TRIMESTER: ICD-10-CM

## 2024-06-11 DIAGNOSIS — Z3A.28 28 WEEKS GESTATION OF PREGNANCY: Primary | ICD-10-CM

## 2024-06-11 DIAGNOSIS — Z23 NEED FOR TDAP VACCINATION: ICD-10-CM

## 2024-06-11 PROCEDURE — 0502F SUBSEQUENT PRENATAL CARE: CPT | Performed by: OBSTETRICS & GYNECOLOGY

## 2024-06-11 PROCEDURE — 90471 IMMUNIZATION ADMIN: CPT | Performed by: OBSTETRICS & GYNECOLOGY

## 2024-06-11 PROCEDURE — 90715 TDAP VACCINE 7 YRS/> IM: CPT | Performed by: OBSTETRICS & GYNECOLOGY

## 2024-06-11 RX ORDER — OMEPRAZOLE 40 MG/1
40 CAPSULE, DELAYED RELEASE ORAL
Qty: 90 CAPSULE | Refills: 1 | Status: SHIPPED | OUTPATIENT
Start: 2024-06-11

## 2024-06-11 NOTE — PROGRESS NOTES
After obtaining verbal consent, and per orders of Dr. Kayla Alexander, injection of Tdap 0.5mL given in Left deltoid IM by Sydnee Dale. Patient instructed to remain in clinic for 20 minutes afterwards, and to report any adverse reaction to me immediately.       
pregnancy. She will think about it and call local pharmacies       Tdap today  Reviewed normal 3 hr glucose  Discussed s/sx that should prompt call to the office  Discussed kick counts  Pt counseled to continue PNVs  RTC in 2 wks    Kayla Alexander MD

## 2024-06-23 NOTE — PROGRESS NOTES
Adrienne is a  @ 30w3d who presents for GOOD visit.  She denies LOF, VB or Ctxs.  + FM.  She is doing much better with her GERD with prilosec.  She denies any fevers/chills, SOB, cough, sore throat, loss of taste/smell or sick contacts.  Pt denies any HA, vision changes or RUQ pain.     O:  Vitals:    24 0758   BP: 122/77   Pulse: 94   SpO2: 92%     Gen: NAD  Abd: soft, nontender, gravid  Ext:  no edema      BP: 122/77  Weight - Scale: 95.3 kg (210 lb)  Pulse: 94  Fundal Height (cm): 30 cm  Fetal HR: 140  Movement: Present    A/P:  Patient Active Problem List    Diagnosis Date Noted    Childhood Asthma 2022     Priority: Medium    BMI 36.0-36.9,adult 2022     Priority: Medium    PLTCS 22 M Apg 8/9 Wt 9#0 2022     Priority: Medium    Need for Tdap vaccination 2024    Elevated 1 hr GTT 2024     Passed 3 hour GTT      Abnormal Pap Smear 2024 ASCUS with negative HPV      Breech presentation 2024    Multigravida of advanced maternal age in second trimester 2024    G2- Short interval between pregnancies 2024     Delivered 2022      Intraoperative hemorrhage 2022     QBL 1092 mL  Hgb 12.4>7.7       - Discussed updated COVID precautions and policies. Reviewed updated visitor policy. Encouraged social distancing and appropriate hand washing/hygiene practices. Reviewed symptoms suspicious for COVID infection. Discussed that ACOG, SMFM, and the CDC recommend to not withold immunization in pregnant and breastfeeding women who meet criteria for receipt of the vaccine based on the ACIP recommended priority groups. All questions answered. Patient vocalized understanding.    - RSV vaccination (32-36 weeks): The patient was counseled on benefits to her baby if she receives the Pfizer RSV vaccine (Abrysvo) during pregnancy. She was informed that this vaccination is FDA approved for use during pregnancy. She will think about it and call local

## 2024-06-25 ENCOUNTER — ROUTINE PRENATAL (OUTPATIENT)
Dept: OBGYN CLINIC | Age: 34
End: 2024-06-25

## 2024-06-25 ENCOUNTER — ROUTINE PRENATAL (OUTPATIENT)
Dept: PERINATAL CARE | Age: 34
End: 2024-06-25
Payer: COMMERCIAL

## 2024-06-25 VITALS
DIASTOLIC BLOOD PRESSURE: 70 MMHG | HEART RATE: 82 BPM | SYSTOLIC BLOOD PRESSURE: 120 MMHG | BODY MASS INDEX: 35.85 KG/M2 | TEMPERATURE: 98 F | HEIGHT: 64 IN | WEIGHT: 210 LBS

## 2024-06-25 VITALS
DIASTOLIC BLOOD PRESSURE: 77 MMHG | HEIGHT: 64 IN | OXYGEN SATURATION: 92 % | BODY MASS INDEX: 35.85 KG/M2 | HEART RATE: 94 BPM | WEIGHT: 210 LBS | SYSTOLIC BLOOD PRESSURE: 122 MMHG

## 2024-06-25 DIAGNOSIS — O09.899 SHORT INTERVAL BETWEEN PREGNANCIES COMPLICATING PREGNANCY, ANTEPARTUM: ICD-10-CM

## 2024-06-25 DIAGNOSIS — O09.523 MULTIGRAVIDA OF ADVANCED MATERNAL AGE IN THIRD TRIMESTER: Primary | ICD-10-CM

## 2024-06-25 DIAGNOSIS — Z36.3 ENCOUNTER FOR ROUTINE SCREENING FOR MALFORMATION USING ULTRASONICS: ICD-10-CM

## 2024-06-25 DIAGNOSIS — O09.293 HISTORY OF MACROSOMIA IN INFANT IN PRIOR PREGNANCY, CURRENTLY PREGNANT IN THIRD TRIMESTER: ICD-10-CM

## 2024-06-25 DIAGNOSIS — Z3A.30 30 WEEKS GESTATION OF PREGNANCY: Primary | ICD-10-CM

## 2024-06-25 DIAGNOSIS — O99.213 OBESITY AFFECTING PREGNANCY IN THIRD TRIMESTER, UNSPECIFIED OBESITY TYPE: ICD-10-CM

## 2024-06-25 DIAGNOSIS — O35.2XX0 HEREDITARY FAMILIAL DISEASE AFFECTING MANAGEMENT OF MOTHER AND POSSIBLY AFFECTING FETUS, ANTEPARTUM, SINGLE OR UNSPECIFIED FETUS: ICD-10-CM

## 2024-06-25 DIAGNOSIS — Z3A.30 30 WEEKS GESTATION OF PREGNANCY: ICD-10-CM

## 2024-06-25 DIAGNOSIS — O09.93 SUPERVISION OF HIGH RISK PREGNANCY IN THIRD TRIMESTER: ICD-10-CM

## 2024-06-25 PROCEDURE — 0502F SUBSEQUENT PRENATAL CARE: CPT | Performed by: OBSTETRICS & GYNECOLOGY

## 2024-06-25 PROCEDURE — 76819 FETAL BIOPHYS PROFIL W/O NST: CPT | Performed by: OBSTETRICS & GYNECOLOGY

## 2024-06-25 PROCEDURE — 99999 PR OFFICE/OUTPT VISIT,PROCEDURE ONLY: CPT | Performed by: OBSTETRICS & GYNECOLOGY

## 2024-06-25 PROCEDURE — 76805 OB US >/= 14 WKS SNGL FETUS: CPT | Performed by: OBSTETRICS & GYNECOLOGY

## 2024-07-09 NOTE — PROGRESS NOTES
Prenatal Visit    Adrienne Oliveira is a 33 y.o. female  at 32w4d IUP    The patient was seen and evaluated. She has no complaints today.  She reports positive fetal movements. She denies contractions, vaginal bleeding and leakage of fluid. Signs and symptoms of labor and pre-eclampsia were reviewed with the patient in detail. She is to report any of these if they occur. She currently denies any of these. She desires to schedule her induction today     The problem list reflects the active issues addressed during today's visit    Vitals:    BP: 115/80  Weight - Scale: 96.6 kg (213 lb)  Pulse: (!) 105  Patient Position: Sitting  Fundal Height (cm): 32 cm  Fetal HR: 150  Movement: Present     PHYSICAL:   General appearance: no apparent distress, alert and cooperative  HEENT: head atraumatic, normocephalic, trachea midline, moist mucous membranes   Neurologic: alert, oriented, normal speech   Lungs: no increased work of breathing,   Abdomen: soft, gravid, non-tender on palpation    Musculoskeletal: no gross abnormalities, range of motion appropriate for age   Psychiatric: mood appropriate, normal affect      NST:   Fetal heart rate baseline: 150, moderate variability, accelerations present, absent decelerations     The tracing has been reviewed and is considered reactive.      Assessment & Plan:  Adrienne Oliveira is a 33 y.o. female  at 32w4d   - VSS                 - 28 week labs completed and reviewed                - Baby was still breech at her last scan on 24   - Follow up as clinically indicated with MFM office                - Continue taking prenatal vitamins QD               - Rhogam not indicated   - Influenza vaccination: done               - S/p COVID-19 vaccination x4   - TDAP done    - Fetal tachycardia was initially noted with heart rate in the 180s-190s. She was placed on the monitor and tachycardia resolved and baseline returned to 150s. Patient admits she drank caffeine on an empty stomach

## 2024-07-10 ENCOUNTER — ROUTINE PRENATAL (OUTPATIENT)
Dept: OBGYN CLINIC | Age: 34
End: 2024-07-10
Payer: COMMERCIAL

## 2024-07-10 VITALS
HEART RATE: 105 BPM | BODY MASS INDEX: 36.56 KG/M2 | DIASTOLIC BLOOD PRESSURE: 80 MMHG | SYSTOLIC BLOOD PRESSURE: 115 MMHG | WEIGHT: 213 LBS

## 2024-07-10 DIAGNOSIS — O09.93 HIGH-RISK PREGNANCY IN THIRD TRIMESTER: Primary | ICD-10-CM

## 2024-07-10 DIAGNOSIS — Z3A.32 32 WEEKS GESTATION OF PREGNANCY: ICD-10-CM

## 2024-07-10 PROCEDURE — 0502F SUBSEQUENT PRENATAL CARE: CPT | Performed by: STUDENT IN AN ORGANIZED HEALTH CARE EDUCATION/TRAINING PROGRAM

## 2024-07-10 PROCEDURE — 59025 FETAL NON-STRESS TEST: CPT | Performed by: STUDENT IN AN ORGANIZED HEALTH CARE EDUCATION/TRAINING PROGRAM

## 2024-08-06 ENCOUNTER — HOSPITAL ENCOUNTER (OUTPATIENT)
Age: 34
Setting detail: SPECIMEN
Discharge: HOME | End: 2024-08-06

## 2024-08-06 ENCOUNTER — ROUTINE PRENATAL (OUTPATIENT)
Dept: OBGYN CLINIC | Age: 34
End: 2024-08-06

## 2024-08-06 VITALS
HEART RATE: 109 BPM | DIASTOLIC BLOOD PRESSURE: 78 MMHG | BODY MASS INDEX: 36.84 KG/M2 | HEIGHT: 64 IN | WEIGHT: 215.8 LBS | SYSTOLIC BLOOD PRESSURE: 109 MMHG

## 2024-08-06 DIAGNOSIS — O34.219 HISTORY OF CESAREAN SECTION COMPLICATING PREGNANCY: Primary | ICD-10-CM

## 2024-08-06 DIAGNOSIS — O09.93 HIGH-RISK PREGNANCY IN THIRD TRIMESTER: ICD-10-CM

## 2024-08-06 DIAGNOSIS — Z3A.36 36 WEEKS GESTATION OF PREGNANCY: ICD-10-CM

## 2024-08-06 DIAGNOSIS — O09.529 ANTEPARTUM MULTIGRAVIDA OF ADVANCED MATERNAL AGE: ICD-10-CM

## 2024-08-06 DIAGNOSIS — O09.892 SHORT INTERVAL BETWEEN PREGNANCIES AFFECTING PREGNANCY IN SECOND TRIMESTER, ANTEPARTUM: ICD-10-CM

## 2024-08-06 PROBLEM — O09.522 MULTIGRAVIDA OF ADVANCED MATERNAL AGE IN SECOND TRIMESTER: Status: RESOLVED | Noted: 2024-05-02 | Resolved: 2024-08-06

## 2024-08-06 PROCEDURE — 0502F SUBSEQUENT PRENATAL CARE: CPT | Performed by: STUDENT IN AN ORGANIZED HEALTH CARE EDUCATION/TRAINING PROGRAM

## 2024-08-06 NOTE — PROGRESS NOTES
Prenatal Visit    Adrienne Oliveira is a 33 y.o. female  at 36w3d    The patient was seen and evaluated. Reports positive fetal movements. She denies headache, vision changes, RUQ pain, contractions, vaginal bleeding and leakage of fluid.      The patient already received the T-Dap Vaccine (27-36 weeks) this pregnancy.       The problem list reflects the active issues addressed during today's visit.    Allergies:  No Known Allergies    Vitals:    BP: 109/78  Weight - Scale: 97.9 kg (215 lb 12.8 oz)  Pulse: (!) 109  Fundal Height (cm): 36 cm  Fetal HR: 145  Movement: Present  Presentation: Vertex     Labs:  Group Beta Strep collection was done.  Sensitivities for clindamycin and erythromycin were not ordered.    Assessment & Plan:  Adrienne Oliveira is a 33 y.o. female  at 36w3d   - GBS testing was ordered.  Patient Active Problem List    Diagnosis Date Noted    Childhood Asthma 2022     Priority: Medium    BMI 36.0-36.9,adult 2022     Priority: Medium    PLTCS 22 M Apg 8/9 Wt 9#0 2022     Priority: Medium    Hx C/S x 1 2024     Desires Repeat C/S      Antepartum multigravida of advanced maternal age 2024    Need for Tdap vaccination 2024    Elevated 1 hr GTT, normal 3 hr GTT 2024     Passed 3 hour GTT      Abnormal Pap Smear 2024 ASCUS with negative HPV      Breech presentation 2024    G2- Short interval between pregnancies 2024     Delivered 2022      Intraoperative hemorrhage 2022     QBL 1092 mL  Hgb 12.4>7.7        - Discussed signs or symptoms of when to call office   - Discussed fetal movement parameters  - Repeat C/S 24 @ 0800 with Dr. Killian  - Bedside US shows Vertex presentation today  - Continue taking Prenatal Vitamin.   - The ACIP recommended pregnant patients be included in phase 1C of vaccine distribution. This decision is supported by [facility] and ACOG. As of 2021, there have been over 30,000

## 2024-08-06 NOTE — PROGRESS NOTES
Chaperone for Intimate Exam  Chaperone was offered as part of the rooming process. Patient declined and agrees to continue with exam without a chaperone.  Chaperone: None

## 2024-08-09 LAB
MICROORGANISM SPEC CULT: NORMAL
SERVICE CMNT-IMP: NORMAL
SPECIMEN DESCRIPTION: NORMAL

## 2024-08-11 NOTE — PROGRESS NOTES
Adrienne is a  @ 37w2d who presents for GOOD visit.  She denies LOF, VB or Ctxs.  + FM.  She is feeling uncomfortable all the time and it is worse than her last since she has a toddler at home.  She denies any fevers/chills, SOB, cough, sore throat, loss of taste/smell or sick contacts.  Pt denies any HA, vision changes or RUQ pain.     O:  Vitals:    24 0748   BP: 133/89   Pulse: (!) 104     Gen: NAD  Abd: soft, nontender, gravid  Ext:  no edema      BP: 133/89  Weight - Scale: 98 kg (216 lb)  Pulse: (!) 104  Patient Position: Sitting  Fundal Height (cm): 37 cm  Fetal HR: 145  Movement: Present    A/P:  Patient Active Problem List    Diagnosis Date Noted    Childhood Asthma 2022     Priority: Medium    BMI 36.0-36.9,adult 2022     Priority: Medium    PLTCS 22 M Apg 8/9 Wt 9#0 2022     Priority: Medium    Hx C/S x 1 2024     Desires Repeat C/S      Antepartum multigravida of advanced maternal age 2024    Need for Tdap vaccination 2024    Elevated 1 hr GTT, normal 3 hr GTT 2024     Passed 3 hour GTT      Abnormal Pap Smear 2024 ASCUS with negative HPV      BREECH (RSLVD) 2024     Bedside US 24 shows Vertex presentation      G2- Short interval between pregnancies 2024     Delivered 2022      Intraoperative hemorrhage 2022     QBL 1092 mL  Hgb 12.4>7.7       - Discussed updated COVID precautions and policies. Reviewed updated visitor policy. Encouraged social distancing and appropriate hand washing/hygiene practices. Reviewed symptoms suspicious for COVID infection. Discussed that ACOG, SMFM, and the CDC recommend to not withold immunization in pregnant and breastfeeding women who meet criteria for receipt of the vaccine based on the ACIP recommended priority groups. All questions answered. Patient vocalized understanding.    - RSV vaccination (32-36 weeks): The patient was counseled on benefits to her baby if she

## 2024-08-12 ENCOUNTER — ROUTINE PRENATAL (OUTPATIENT)
Dept: OBGYN CLINIC | Age: 34
End: 2024-08-12

## 2024-08-12 VITALS
SYSTOLIC BLOOD PRESSURE: 133 MMHG | HEART RATE: 104 BPM | WEIGHT: 216 LBS | DIASTOLIC BLOOD PRESSURE: 89 MMHG | BODY MASS INDEX: 37.06 KG/M2

## 2024-08-12 DIAGNOSIS — Z3A.37 37 WEEKS GESTATION OF PREGNANCY: Primary | ICD-10-CM

## 2024-08-12 DIAGNOSIS — O09.93 SUPERVISION OF HIGH RISK PREGNANCY IN THIRD TRIMESTER: ICD-10-CM

## 2024-08-12 PROCEDURE — 0502F SUBSEQUENT PRENATAL CARE: CPT | Performed by: OBSTETRICS & GYNECOLOGY

## 2024-08-19 ENCOUNTER — ROUTINE PRENATAL (OUTPATIENT)
Dept: OBGYN CLINIC | Age: 34
End: 2024-08-19

## 2024-08-19 VITALS
SYSTOLIC BLOOD PRESSURE: 119 MMHG | DIASTOLIC BLOOD PRESSURE: 83 MMHG | HEART RATE: 118 BPM | BODY MASS INDEX: 37.4 KG/M2 | WEIGHT: 218 LBS

## 2024-08-19 DIAGNOSIS — Z3A.38 38 WEEKS GESTATION OF PREGNANCY: Primary | ICD-10-CM

## 2024-08-19 DIAGNOSIS — O09.93 SUPERVISION OF HIGH RISK PREGNANCY IN THIRD TRIMESTER: ICD-10-CM

## 2024-08-19 PROCEDURE — 0502F SUBSEQUENT PRENATAL CARE: CPT | Performed by: OBSTETRICS & GYNECOLOGY

## 2024-08-26 ENCOUNTER — ROUTINE PRENATAL (OUTPATIENT)
Dept: OBGYN CLINIC | Age: 34
End: 2024-08-26

## 2024-08-26 VITALS
BODY MASS INDEX: 37.73 KG/M2 | DIASTOLIC BLOOD PRESSURE: 86 MMHG | HEIGHT: 64 IN | HEART RATE: 101 BPM | WEIGHT: 221 LBS | SYSTOLIC BLOOD PRESSURE: 124 MMHG

## 2024-08-26 DIAGNOSIS — O09.529 ANTEPARTUM MULTIGRAVIDA OF ADVANCED MATERNAL AGE: ICD-10-CM

## 2024-08-26 DIAGNOSIS — Z3A.39 39 WEEKS GESTATION OF PREGNANCY: ICD-10-CM

## 2024-08-26 DIAGNOSIS — O99.810 ABNORMAL GLUCOSE TOLERANCE IN PREGNANCY: ICD-10-CM

## 2024-08-26 DIAGNOSIS — O09.93 SUPERVISION OF HIGH RISK PREGNANCY IN THIRD TRIMESTER: ICD-10-CM

## 2024-08-26 DIAGNOSIS — O09.892 SHORT INTERVAL BETWEEN PREGNANCIES AFFECTING PREGNANCY IN SECOND TRIMESTER, ANTEPARTUM: ICD-10-CM

## 2024-08-26 DIAGNOSIS — O34.219 HISTORY OF CESAREAN SECTION COMPLICATING PREGNANCY: Primary | ICD-10-CM

## 2024-08-26 PROCEDURE — 0502F SUBSEQUENT PRENATAL CARE: CPT | Performed by: STUDENT IN AN ORGANIZED HEALTH CARE EDUCATION/TRAINING PROGRAM

## 2024-08-26 NOTE — PROGRESS NOTES
Prenatal Visit    Adrienne Oliveira is a 33 y.o. female  at 39w2d    The patient was seen and evaluated. Reports positive fetal movements. She denies headache, vision changes, RUQ pain, contractions, vaginal bleeding and leakage of fluid.      The patient already received the T-Dap Vaccine (27-36 weeks) this pregnancy.      The problem list reflects the active issues addressed during today's visit.    Allergies:  Patient has no known allergies.    Vitals:    BP: 124/86  Weight - Scale: 100.2 kg (221 lb)  Pulse: (!) 101  Patient Position: Sitting  Fundal Height (cm): 39 cm  Fetal HR: 152  Movement: Present     Physical Exam:  SVE: N/A    Labs:  Group Beta Strep collection was done.   Sensitivities for clindamycin and erythromycin were not ordered.  The patient was found to be GBS: negative    Assessment & Plan:  Adrienne Oliveira is a 33 y.o. female  at 39w2d   - Discussed signs or symptoms of when to call office   - Discussed fetal movement parameters  - Repeat C/S on 24 @ 0800   - Continue taking Prenatal Vitamin.   - The ACIP recommended pregnant patients be included in phase 1C of vaccine distribution. This decision is supported by [facility] and ACOG. As of 2021, there have been over 30,000 pregnant patients included in the V-safe post COVID vaccination safety . Most (73%) reports to VAERS among pregnant women involved non-pregnancyspecific adverse events (e.g., local and systemic reactions). Miscarriage was the most frequently reported pregnancy-specific adverse event to VAERS; numbers are within the known background rates based on presumed COVID-19 vaccine doses administered to pregnant women. No unexpected pregnancy or infant outcomes have been observed related to COVID-19 vaccination during pregnancy. Recommended patient proceed with vaccination during appropriate season.         Patient Active Problem List    Diagnosis Date Noted    Childhood Asthma 2022     Priority: Medium

## 2024-08-28 ENCOUNTER — ANESTHESIA EVENT (OUTPATIENT)
Dept: LABOR AND DELIVERY | Age: 34
End: 2024-08-28
Payer: COMMERCIAL

## 2024-08-28 PROBLEM — Q69.9 POLYDACTYLY: Status: ACTIVE | Noted: 2024-08-28

## 2024-08-29 ENCOUNTER — ANESTHESIA (OUTPATIENT)
Dept: LABOR AND DELIVERY | Age: 34
End: 2024-08-29
Payer: COMMERCIAL

## 2024-08-29 ENCOUNTER — HOSPITAL ENCOUNTER (INPATIENT)
Age: 34
LOS: 2 days | Discharge: HOME | End: 2024-08-31
Attending: STUDENT IN AN ORGANIZED HEALTH CARE EDUCATION/TRAINING PROGRAM | Admitting: STUDENT IN AN ORGANIZED HEALTH CARE EDUCATION/TRAINING PROGRAM
Payer: COMMERCIAL

## 2024-08-29 DIAGNOSIS — Z98.890 POST-OPERATIVE STATE: Primary | ICD-10-CM

## 2024-08-29 PROBLEM — O09.93 HIGH-RISK PREGNANCY IN THIRD TRIMESTER: Status: ACTIVE | Noted: 2024-08-29

## 2024-08-29 PROBLEM — Z98.891 H/O CESAREAN SECTION: Status: ACTIVE | Noted: 2024-08-29

## 2024-08-29 PROBLEM — Z3A.39 39 WEEKS GESTATION OF PREGNANCY: Status: ACTIVE | Noted: 2024-08-29

## 2024-08-29 LAB
ABO + RH BLD: NORMAL
AMPHET UR QL SCN: NEGATIVE
ARM BAND NUMBER: NORMAL
BARBITURATES UR QL SCN: NEGATIVE
BENZODIAZ UR QL: NEGATIVE
BLOOD BANK SAMPLE EXPIRATION: NORMAL
BLOOD GROUP ANTIBODIES SERPL: NEGATIVE
CANNABINOIDS UR QL SCN: NEGATIVE
COCAINE UR QL SCN: NEGATIVE
ERYTHROCYTE [DISTWIDTH] IN BLOOD BY AUTOMATED COUNT: 13.8 % (ref 11.8–14.4)
FENTANYL UR QL: NEGATIVE
HCT VFR BLD AUTO: 35.8 % (ref 36.3–47.1)
HGB BLD-MCNC: 11.7 G/DL (ref 11.9–15.1)
MCH RBC QN AUTO: 27.5 PG (ref 25.2–33.5)
MCHC RBC AUTO-ENTMCNC: 32.7 G/DL (ref 28.4–34.8)
MCV RBC AUTO: 84 FL (ref 82.6–102.9)
METHADONE UR QL: NEGATIVE
NRBC BLD-RTO: 0 PER 100 WBC
OPIATES UR QL SCN: NEGATIVE
OXYCODONE UR QL SCN: NEGATIVE
PCP UR QL SCN: NEGATIVE
PLATELET # BLD AUTO: 213 K/UL (ref 138–453)
PMV BLD AUTO: 9.9 FL (ref 8.1–13.5)
RBC # BLD AUTO: 4.26 M/UL (ref 3.95–5.11)
T PALLIDUM AB SER QL IA: NONREACTIVE
TEST INFORMATION: NORMAL
WBC OTHER # BLD: 10.4 K/UL (ref 3.5–11.3)

## 2024-08-29 PROCEDURE — 2500000003 HC RX 250 WO HCPCS

## 2024-08-29 PROCEDURE — 2580000003 HC RX 258

## 2024-08-29 PROCEDURE — 3609079900 HC CESAREAN SECTION: Performed by: STUDENT IN AN ORGANIZED HEALTH CARE EDUCATION/TRAINING PROGRAM

## 2024-08-29 PROCEDURE — 6360000002 HC RX W HCPCS

## 2024-08-29 PROCEDURE — 1220000000 HC SEMI PRIVATE OB R&B

## 2024-08-29 PROCEDURE — 7100000000 HC PACU RECOVERY - FIRST 15 MIN: Performed by: STUDENT IN AN ORGANIZED HEALTH CARE EDUCATION/TRAINING PROGRAM

## 2024-08-29 PROCEDURE — 7100000001 HC PACU RECOVERY - ADDTL 15 MIN: Performed by: STUDENT IN AN ORGANIZED HEALTH CARE EDUCATION/TRAINING PROGRAM

## 2024-08-29 PROCEDURE — 80307 DRUG TEST PRSMV CHEM ANLYZR: CPT

## 2024-08-29 PROCEDURE — 86900 BLOOD TYPING SEROLOGIC ABO: CPT

## 2024-08-29 PROCEDURE — 6370000000 HC RX 637 (ALT 250 FOR IP)

## 2024-08-29 PROCEDURE — 59510 CESAREAN DELIVERY: CPT | Performed by: STUDENT IN AN ORGANIZED HEALTH CARE EDUCATION/TRAINING PROGRAM

## 2024-08-29 PROCEDURE — 6360000002 HC RX W HCPCS: Performed by: ANESTHESIOLOGY

## 2024-08-29 PROCEDURE — 86850 RBC ANTIBODY SCREEN: CPT

## 2024-08-29 PROCEDURE — 3700000000 HC ANESTHESIA ATTENDED CARE: Performed by: STUDENT IN AN ORGANIZED HEALTH CARE EDUCATION/TRAINING PROGRAM

## 2024-08-29 PROCEDURE — 88307 TISSUE EXAM BY PATHOLOGIST: CPT

## 2024-08-29 PROCEDURE — 86780 TREPONEMA PALLIDUM: CPT

## 2024-08-29 PROCEDURE — 3700000001 HC ADD 15 MINUTES (ANESTHESIA): Performed by: STUDENT IN AN ORGANIZED HEALTH CARE EDUCATION/TRAINING PROGRAM

## 2024-08-29 PROCEDURE — 85027 COMPLETE CBC AUTOMATED: CPT

## 2024-08-29 PROCEDURE — 86901 BLOOD TYPING SEROLOGIC RH(D): CPT

## 2024-08-29 RX ORDER — SCOLOPAMINE TRANSDERMAL SYSTEM 1 MG/1
1 PATCH, EXTENDED RELEASE TRANSDERMAL
Status: DISCONTINUED | OUTPATIENT
Start: 2024-08-29 | End: 2024-08-29

## 2024-08-29 RX ORDER — MORPHINE SULFATE 1 MG/ML
INJECTION, SOLUTION EPIDURAL; INTRATHECAL; INTRAVENOUS
Status: COMPLETED | OUTPATIENT
Start: 2024-08-29 | End: 2024-08-29

## 2024-08-29 RX ORDER — SODIUM CHLORIDE, SODIUM LACTATE, POTASSIUM CHLORIDE, CALCIUM CHLORIDE 600; 310; 30; 20 MG/100ML; MG/100ML; MG/100ML; MG/100ML
INJECTION, SOLUTION INTRAVENOUS CONTINUOUS
Status: DISCONTINUED | OUTPATIENT
Start: 2024-08-29 | End: 2024-08-29

## 2024-08-29 RX ORDER — IBUPROFEN 600 MG/1
600 TABLET, FILM COATED ORAL EVERY 6 HOURS PRN
Qty: 60 TABLET | Refills: 1 | Status: SHIPPED | OUTPATIENT
Start: 2024-08-29

## 2024-08-29 RX ORDER — ENOXAPARIN SODIUM 100 MG/ML
0.5 INJECTION SUBCUTANEOUS DAILY
Status: DISCONTINUED | OUTPATIENT
Start: 2024-08-30 | End: 2024-08-29

## 2024-08-29 RX ORDER — SODIUM CHLORIDE, SODIUM LACTATE, POTASSIUM CHLORIDE, AND CALCIUM CHLORIDE .6; .31; .03; .02 G/100ML; G/100ML; G/100ML; G/100ML
1000 INJECTION, SOLUTION INTRAVENOUS ONCE
Status: COMPLETED | OUTPATIENT
Start: 2024-08-29 | End: 2024-08-29

## 2024-08-29 RX ORDER — SODIUM CHLORIDE 0.9 % (FLUSH) 0.9 %
5-40 SYRINGE (ML) INJECTION PRN
Status: DISCONTINUED | OUTPATIENT
Start: 2024-08-29 | End: 2024-08-31 | Stop reason: HOSPADM

## 2024-08-29 RX ORDER — KETOROLAC TROMETHAMINE 30 MG/ML
INJECTION, SOLUTION INTRAMUSCULAR; INTRAVENOUS PRN
Status: DISCONTINUED | OUTPATIENT
Start: 2024-08-29 | End: 2024-08-29 | Stop reason: SDUPTHER

## 2024-08-29 RX ORDER — SODIUM CHLORIDE 9 MG/ML
INJECTION, SOLUTION INTRAVENOUS PRN
Status: DISCONTINUED | OUTPATIENT
Start: 2024-08-29 | End: 2024-08-29

## 2024-08-29 RX ORDER — ENOXAPARIN SODIUM 100 MG/ML
40 INJECTION SUBCUTANEOUS DAILY
Status: DISCONTINUED | OUTPATIENT
Start: 2024-08-30 | End: 2024-08-31 | Stop reason: HOSPADM

## 2024-08-29 RX ORDER — ACETAMINOPHEN 500 MG
1000 TABLET ORAL EVERY 6 HOURS PRN
Qty: 80 TABLET | Refills: 1 | Status: SHIPPED | OUTPATIENT
Start: 2024-08-29 | End: 2024-09-28

## 2024-08-29 RX ORDER — ONDANSETRON 2 MG/ML
4 INJECTION INTRAMUSCULAR; INTRAVENOUS EVERY 6 HOURS PRN
Status: DISCONTINUED | OUTPATIENT
Start: 2024-08-29 | End: 2024-08-31 | Stop reason: HOSPADM

## 2024-08-29 RX ORDER — ACETAMINOPHEN 500 MG
1000 TABLET ORAL ONCE
Status: COMPLETED | OUTPATIENT
Start: 2024-08-29 | End: 2024-08-29

## 2024-08-29 RX ORDER — OXYCODONE HYDROCHLORIDE 5 MG/1
5 TABLET ORAL EVERY 6 HOURS PRN
Qty: 20 TABLET | Refills: 0 | Status: SHIPPED | OUTPATIENT
Start: 2024-08-29 | End: 2024-09-05

## 2024-08-29 RX ORDER — METRONIDAZOLE 500 MG/1
500 TABLET ORAL EVERY 8 HOURS SCHEDULED
Status: COMPLETED | OUTPATIENT
Start: 2024-08-29 | End: 2024-08-31

## 2024-08-29 RX ORDER — CITRIC ACID/SODIUM CITRATE 334-500MG
30 SOLUTION, ORAL ORAL ONCE
Status: COMPLETED | OUTPATIENT
Start: 2024-08-29 | End: 2024-08-29

## 2024-08-29 RX ORDER — CEPHALEXIN 500 MG/1
500 CAPSULE ORAL EVERY 8 HOURS SCHEDULED
Status: COMPLETED | OUTPATIENT
Start: 2024-08-29 | End: 2024-08-31

## 2024-08-29 RX ORDER — ACETAMINOPHEN 500 MG
1000 TABLET ORAL EVERY 6 HOURS
Status: DISCONTINUED | OUTPATIENT
Start: 2024-08-29 | End: 2024-08-31 | Stop reason: HOSPADM

## 2024-08-29 RX ORDER — OXYCODONE HYDROCHLORIDE 5 MG/1
5 TABLET ORAL EVERY 4 HOURS PRN
Status: DISCONTINUED | OUTPATIENT
Start: 2024-08-29 | End: 2024-08-31 | Stop reason: HOSPADM

## 2024-08-29 RX ORDER — ONDANSETRON 4 MG/1
4 TABLET, ORALLY DISINTEGRATING ORAL EVERY 8 HOURS PRN
Status: DISCONTINUED | OUTPATIENT
Start: 2024-08-29 | End: 2024-08-31 | Stop reason: HOSPADM

## 2024-08-29 RX ORDER — NALBUPHINE HYDROCHLORIDE 10 MG/ML
10 INJECTION, SOLUTION INTRAMUSCULAR; INTRAVENOUS; SUBCUTANEOUS ONCE
Status: COMPLETED | OUTPATIENT
Start: 2024-08-29 | End: 2024-08-29

## 2024-08-29 RX ORDER — CEFAZOLIN SODIUM 1 G/3ML
INJECTION, POWDER, FOR SOLUTION INTRAMUSCULAR; INTRAVENOUS PRN
Status: DISCONTINUED | OUTPATIENT
Start: 2024-08-29 | End: 2024-08-29

## 2024-08-29 RX ORDER — OXYCODONE HYDROCHLORIDE 5 MG/1
10 TABLET ORAL EVERY 4 HOURS PRN
Status: DISCONTINUED | OUTPATIENT
Start: 2024-08-29 | End: 2024-08-31 | Stop reason: HOSPADM

## 2024-08-29 RX ORDER — SODIUM CHLORIDE 0.9 % (FLUSH) 0.9 %
5-40 SYRINGE (ML) INJECTION EVERY 12 HOURS SCHEDULED
Status: DISCONTINUED | OUTPATIENT
Start: 2024-08-29 | End: 2024-08-31 | Stop reason: HOSPADM

## 2024-08-29 RX ORDER — IBUPROFEN 600 MG/1
600 TABLET, FILM COATED ORAL EVERY 6 HOURS
Status: DISCONTINUED | OUTPATIENT
Start: 2024-08-30 | End: 2024-08-31 | Stop reason: HOSPADM

## 2024-08-29 RX ORDER — KETOROLAC TROMETHAMINE 30 MG/ML
30 INJECTION, SOLUTION INTRAMUSCULAR; INTRAVENOUS EVERY 6 HOURS
Status: COMPLETED | OUTPATIENT
Start: 2024-08-29 | End: 2024-08-30

## 2024-08-29 RX ORDER — BUPIVACAINE HYDROCHLORIDE 7.5 MG/ML
INJECTION, SOLUTION EPIDURAL; RETROBULBAR
Status: COMPLETED | OUTPATIENT
Start: 2024-08-29 | End: 2024-08-29

## 2024-08-29 RX ORDER — TRANEXAMIC ACID 10 MG/ML
1000 INJECTION, SOLUTION INTRAVENOUS ONCE
Status: COMPLETED | OUTPATIENT
Start: 2024-08-29 | End: 2024-08-29

## 2024-08-29 RX ORDER — SODIUM CHLORIDE 9 MG/ML
INJECTION, SOLUTION INTRAVENOUS PRN
Status: DISCONTINUED | OUTPATIENT
Start: 2024-08-29 | End: 2024-08-31 | Stop reason: HOSPADM

## 2024-08-29 RX ORDER — SENNOSIDES A AND B 8.6 MG/1
1 TABLET, FILM COATED ORAL 2 TIMES DAILY
Qty: 60 TABLET | Refills: 1 | Status: SHIPPED | OUTPATIENT
Start: 2024-08-29 | End: 2025-08-29

## 2024-08-29 RX ORDER — ONDANSETRON 2 MG/ML
4 INJECTION INTRAMUSCULAR; INTRAVENOUS EVERY 6 HOURS PRN
Status: DISCONTINUED | OUTPATIENT
Start: 2024-08-29 | End: 2024-08-29 | Stop reason: SDUPTHER

## 2024-08-29 RX ORDER — SODIUM CHLORIDE 0.9 % (FLUSH) 0.9 %
10 SYRINGE (ML) INJECTION PRN
Status: DISCONTINUED | OUTPATIENT
Start: 2024-08-29 | End: 2024-08-29

## 2024-08-29 RX ORDER — LANOLIN 72 %
OINTMENT (GRAM) TOPICAL
Status: DISCONTINUED | OUTPATIENT
Start: 2024-08-29 | End: 2024-08-31 | Stop reason: HOSPADM

## 2024-08-29 RX ORDER — SODIUM CHLORIDE 0.9 % (FLUSH) 0.9 %
10 SYRINGE (ML) INJECTION EVERY 12 HOURS SCHEDULED
Status: DISCONTINUED | OUTPATIENT
Start: 2024-08-29 | End: 2024-08-29

## 2024-08-29 RX ORDER — SENNA AND DOCUSATE SODIUM 50; 8.6 MG/1; MG/1
1 TABLET, FILM COATED ORAL DAILY
Status: DISCONTINUED | OUTPATIENT
Start: 2024-08-29 | End: 2024-08-31 | Stop reason: HOSPADM

## 2024-08-29 RX ADMIN — MORPHINE SULFATE 0.2 MG: 1 INJECTION, SOLUTION EPIDURAL; INTRATHECAL; INTRAVENOUS at 08:18

## 2024-08-29 RX ADMIN — METRONIDAZOLE 500 MG: 500 TABLET ORAL at 13:20

## 2024-08-29 RX ADMIN — NALBUPHINE HYDROCHLORIDE 10 MG: 10 INJECTION, SOLUTION INTRAMUSCULAR; INTRAVENOUS; SUBCUTANEOUS at 14:15

## 2024-08-29 RX ADMIN — ACETAMINOPHEN 1000 MG: 500 TABLET ORAL at 13:20

## 2024-08-29 RX ADMIN — SODIUM CHLORIDE, PRESERVATIVE FREE 10 ML: 5 INJECTION INTRAVENOUS at 07:50

## 2024-08-29 RX ADMIN — BUPIVACAINE HYDROCHLORIDE 12.75 MG: 7.5 INJECTION, SOLUTION EPIDURAL; RETROBULBAR at 08:18

## 2024-08-29 RX ADMIN — Medication 87.3 MILLI-UNITS/MIN: at 08:51

## 2024-08-29 RX ADMIN — PHENYLEPHRINE HYDROCHLORIDE 50 MCG/MIN: 10 INJECTION INTRAVENOUS at 08:19

## 2024-08-29 RX ADMIN — ACETAMINOPHEN 1000 MG: 500 TABLET ORAL at 07:43

## 2024-08-29 RX ADMIN — ACETAMINOPHEN 1000 MG: 500 TABLET ORAL at 19:31

## 2024-08-29 RX ADMIN — KETOROLAC TROMETHAMINE 30 MG: 30 INJECTION, SOLUTION INTRAMUSCULAR; INTRAVENOUS at 09:30

## 2024-08-29 RX ADMIN — CEPHALEXIN 500 MG: 500 CAPSULE ORAL at 19:31

## 2024-08-29 RX ADMIN — SODIUM CITRATE AND CITRIC ACID MONOHYDRATE 30 ML: 500; 334 SOLUTION ORAL at 07:45

## 2024-08-29 RX ADMIN — SODIUM CHLORIDE, POTASSIUM CHLORIDE, SODIUM LACTATE AND CALCIUM CHLORIDE: 600; 310; 30; 20 INJECTION, SOLUTION INTRAVENOUS at 09:53

## 2024-08-29 RX ADMIN — KETOROLAC TROMETHAMINE 30 MG: 30 INJECTION, SOLUTION INTRAMUSCULAR; INTRAVENOUS at 15:34

## 2024-08-29 RX ADMIN — SODIUM CHLORIDE, POTASSIUM CHLORIDE, SODIUM LACTATE AND CALCIUM CHLORIDE: 600; 310; 30; 20 INJECTION, SOLUTION INTRAVENOUS at 08:43

## 2024-08-29 RX ADMIN — Medication 2000 MG: at 08:01

## 2024-08-29 RX ADMIN — METRONIDAZOLE 500 MG: 500 TABLET ORAL at 19:32

## 2024-08-29 RX ADMIN — ONDANSETRON 4 MG: 2 INJECTION INTRAMUSCULAR; INTRAVENOUS at 08:19

## 2024-08-29 RX ADMIN — SODIUM CHLORIDE, PRESERVATIVE FREE 10 ML: 5 INJECTION INTRAVENOUS at 19:32

## 2024-08-29 RX ADMIN — CEPHALEXIN 500 MG: 500 CAPSULE ORAL at 13:20

## 2024-08-29 RX ADMIN — SODIUM CHLORIDE, POTASSIUM CHLORIDE, SODIUM LACTATE AND CALCIUM CHLORIDE 1000 ML: 600; 310; 30; 20 INJECTION, SOLUTION INTRAVENOUS at 07:00

## 2024-08-29 RX ADMIN — SODIUM CHLORIDE, POTASSIUM CHLORIDE, SODIUM LACTATE AND CALCIUM CHLORIDE: 600; 310; 30; 20 INJECTION, SOLUTION INTRAVENOUS at 08:07

## 2024-08-29 RX ADMIN — FAMOTIDINE 20 MG: 10 INJECTION, SOLUTION INTRAVENOUS at 07:45

## 2024-08-29 RX ADMIN — KETOROLAC TROMETHAMINE 30 MG: 30 INJECTION, SOLUTION INTRAMUSCULAR; INTRAVENOUS at 22:06

## 2024-08-29 RX ADMIN — Medication 909 ML/HR: at 08:40

## 2024-08-29 RX ADMIN — SENNOSIDES AND DOCUSATE SODIUM 1 TABLET: 50; 8.6 TABLET ORAL at 13:20

## 2024-08-29 RX ADMIN — TRANEXAMIC ACID 1000 MG: 10 INJECTION, SOLUTION INTRAVENOUS at 08:21

## 2024-08-29 ASSESSMENT — PAIN DESCRIPTION - ORIENTATION
ORIENTATION: MID;LOWER
ORIENTATION: MID;LOWER

## 2024-08-29 ASSESSMENT — PAIN DESCRIPTION - DESCRIPTORS
DESCRIPTORS: DISCOMFORT;SORE
DESCRIPTORS: DISCOMFORT;SORE

## 2024-08-29 ASSESSMENT — PAIN - FUNCTIONAL ASSESSMENT
PAIN_FUNCTIONAL_ASSESSMENT: ACTIVITIES ARE NOT PREVENTED
PAIN_FUNCTIONAL_ASSESSMENT: ACTIVITIES ARE NOT PREVENTED

## 2024-08-29 ASSESSMENT — PAIN SCALES - GENERAL
PAINLEVEL_OUTOF10: 1
PAINLEVEL_OUTOF10: 0
PAINLEVEL_OUTOF10: 2
PAINLEVEL_OUTOF10: 0

## 2024-08-29 ASSESSMENT — PAIN DESCRIPTION - LOCATION
LOCATION: ABDOMEN
LOCATION: ABDOMEN

## 2024-08-29 NOTE — CARE COORDINATION
CASE MANAGEMENT POST-PARTUM TRANSITIONAL CARE PLAN    History of  [Z98.891]  39 weeks gestation of pregnancy [Z3A.39]    OB Provider: Dr. Maria D Melchor    Writer met w/ Adrienne and her  Ryder at her bedside to discuss DCP. She is S/P CS on 24 at 39w5d at 0837 of female infant    Writer verified address/phone number correct on facesheet. She states she lives with her  and their son. She denied barriers with transportation home, to doctors appointments or with paying for medications upon discharge home.     [employer] PPO insurance correct. Writer notified them they have 30 days from date of birth to add  to insurance policy. They verbalized understanding.    Infant name on BC: Cristian.   Infant PCP [facility].     DME: None  HOME CARE: None    Anticipate DC home of couplet in private vehicle in 2-4 days status post C/S.    Readmission Risk              Risk of Unplanned Readmission:  6

## 2024-08-29 NOTE — ANESTHESIA PROCEDURE NOTES
Spinal Block    Patient location during procedure: OR  End time: 8/29/2024 8:18 AM  Reason for block: primary anesthetic  Staffing  Performed: other anesthesia staff   Anesthesiologist: Nilesh Rogers MD  Resident/CRNA: Sylvia Thomas APRN - CRNA  Other anesthesia staff: Tomas Hernandez RN  Performed by: Tomas Hernandez RN  Authorized by: Nilesh Rogers MD    Spinal Block  Patient position: sitting  Prep: Betadine and site prepped and draped  Patient monitoring: continuous pulse ox and frequent blood pressure checks  Approach: midline  Location: L3/L4  Guidance: paresthesia technique  Provider prep: mask and sterile gloves  Local infiltration: lidocaine  Needle  Needle type: Pencan   Needle gauge: 24 G  Needle length: 3.5 in  Assessment  Sensory level: T4  Swirl obtained: Yes  CSF: clear  Attempts: 1  Hemodynamics: stable  Preanesthetic Checklist  Completed: patient identified, IV checked, site marked, risks and benefits discussed, surgical/procedural consents, equipment checked, pre-op evaluation, timeout performed, anesthesia consent given, oxygen available, monitors applied/VS acknowledged, fire risk safety assessment completed and verbalized and blood product R/B/A discussed and consented

## 2024-08-29 NOTE — PLAN OF CARE
Problem: Vaginal Birth or  Section  Goal: Fetal and maternal status remain reassuring during the birth process  Outcome: Progressing     Problem: Postpartum  Goal: Experiences normal postpartum course  Outcome: Progressing  Goal: Appropriate maternal -  bonding  Outcome: Progressing  Goal: Establishment of infant feeding pattern  Outcome: Progressing  Goal: Incisions, wounds, or drain sites healing without S/S of infection  Outcome: Progressing     Problem: Pain  Goal: Verbalizes/displays adequate comfort level or baseline comfort level  Outcome: Progressing     Problem: Infection - Adult  Goal: Absence of infection at discharge  Outcome: Progressing  Goal: Absence of infection during hospitalization  Outcome: Progressing  Goal: Absence of fever/infection during anticipated neutropenic period  Outcome: Progressing     Problem: Safety - Adult  Goal: Free from fall injury  Outcome: Progressing     Problem: Discharge Planning  Goal: Discharge to home or other facility with appropriate resources  Outcome: Progressing

## 2024-08-29 NOTE — ANESTHESIA PRE PROCEDURE
Department of Anesthesiology  Preprocedure Note       Name:  Adrienne Oliveira   Age:  33 y.o.  :  1990                                          MRN:  [id number]         Date:  2024      Surgeon: Surgeon(s):  Hue Melchor DO    Procedure: Procedure(s):   SECTION    Medications prior to admission:   Prior to Admission medications    Medication Sig Start Date End Date Taking? Authorizing Provider   omeprazole (PRILOSEC) 40 MG delayed release capsule Take 1 capsule by mouth every morning (before breakfast) 24   Kayla Alexander MD   Prenatal Vit-Fe Fumarate-FA (PRENATAL PO) Take by mouth    Provider, MD Janina       Current medications:    Current Facility-Administered Medications   Medication Dose Route Frequency Provider Last Rate Last Admin   • lactated ringers IV soln infusion   IntraVENous Continuous Stefanie Escalante MD       • lactated ringers bolus 1,000 mL  1,000 mL IntraVENous Once Stefanie Escalante MD       • sodium chloride flush 0.9 % injection 10 mL  10 mL IntraVENous 2 times per day Stefanie Escalante MD       • sodium chloride flush 0.9 % injection 10 mL  10 mL IntraVENous PRN Stefanie Escalante MD       • 0.9 % sodium chloride infusion   IntraVENous PRN Stefanie Escalante MD       • citric acid-sodium citrate (BICITRA) solution 30 mL  30 mL Oral Once Stefanie Escalante MD       • famotidine (PEPCID) 20 mg in sodium chloride (PF) 0.9 % 10 mL injection  20 mg IntraVENous Once Stefanie Escalante MD       • acetaminophen (TYLENOL) tablet 1,000 mg  1,000 mg Oral Once Stefanie Escalante MD       • ondansetron (ZOFRAN) injection 4 mg  4 mg IntraVENous Q6H PRN Stefanie Escalante MD       • ceFAZolin (ANCEF) 2000 mg in sterile water 20 mL IV syringe  2,000 mg IntraVENous Once Stefanie Escalante MD           Allergies:  No Known Allergies    Problem List:    Patient Active Problem List   Diagnosis Code   • PLTCS 22 M Apg 8/9 Wt 9#0 O82   • Intraoperative hemorrhage T81.89XA   •  results for input(s): \"POCGLU\", \"POCNA\", \"POCK\", \"POCCL\", \"POCBUN\", \"POCHEMO\", \"POCHCT\" in the last 72 hours.    Coags: No results found for: \"PROTIME\", \"INR\", \"APTT\"    HCG (If Applicable):   Lab Results   Component Value Date    PREGTESTUR POSITIVE 12/09/2021        ABGs: No results found for: \"PHART\", \"PO2ART\", \"NNO0APT\", \"CXU7JRZ\", \"BEART\", \"X3RKKNNF\"     Type & Screen (If Applicable):  No results found for: \"LABABO\"    Drug/Infectious Status (If Applicable):  Lab Results   Component Value Date/Time    HEPCAB NONREACTIVE 02/09/2024 11:20 AM       COVID-19 Screening (If Applicable): No results found for: \"COVID19\"        Anesthesia Evaluation  Patient summary reviewed and Nursing notes reviewed  Airway: Mallampati: IV  TM distance: >3 FB   Neck ROM: full  Mouth opening: > = 3 FB   Dental: normal exam         Pulmonary:normal exam    (+)           asthma:                            Cardiovascular:            Rhythm: regular  Rate: normal                    Neuro/Psych:               GI/Hepatic/Renal:             Endo/Other:                     Abdominal:             Vascular:          Other Findings:       Anesthesia Plan      spinal     ASA 2     (Spinal with back up of GETA)      MIPS: Postoperative opioids intended and Prophylactic antiemetics administered.  Anesthetic plan and risks discussed with patient.      Plan discussed with CRNA.                Nilesh Rogers MD   8/29/2024

## 2024-08-29 NOTE — H&P
OBSTETRICAL HISTORY AND PHYSICAL  [facility]    Date: 2024       Time: 7:37 AM   Patient Name: Adrienne Oliveira     Patient : 1990  Room/Bed: REC3/REC3-01    Admission Date/Time: 2024  6:12 AM      CC: Repeat  Section     HPI: Adrienne Oliveira is a 33 y.o.  at 39w5d who presents to Labor and Delivery for scheduled repeat  section secondary to history of CS x1, declines TOLAC. Patient denies any fever, chills, N/V, headaches, vision changes, chest pain, shortness of breath, RUQ pain, abdominal pain, and increased swelling/tenderness in bilateral lower extremities. Patient denies any vaginal discharge and any urinary complaints. The patient reports fetal movement is present, denies contractions, denies loss of fluid, denies vaginal bleeding.    DATING:  LMP: Patient's last menstrual period was 2023 (exact date).  Estimated Date of Delivery: 24   Based on: LMP c/w early US at 8 6/7 weeks GA    PREGNANCY RISK FACTORS:  Patient Active Problem List   Diagnosis    PLTCS 22 M Apg 8/9 Wt 9#0    Intraoperative hemorrhage    Childhood Asthma    Abnormal Pap Smear    BREECH (RSLVD)    G2- Short interval between pregnancies    Elevated 1 hr GTT, normal 3 hr GTT    Need for Tdap vaccination    Hx C/S x 1    Antepartum multigravida of advanced maternal age    FHx Polydactyly    39 weeks gestation of pregnancy        Steroids Given In This Pregnancy:  no     REVIEW OF SYSTEMS:   Constitutional: negative fever, negative chills, negative weight changes   HEENT: negative visual disturbances, negative headaches, negative dizziness, negative hearing loss  Breast: Negative breast abnormalities, negative breast lumps, negative nipple discharge  Respiratory: negative dyspnea, negative cough, negative SOB  Cardiovascular: negative chest pain,  negative palpitations, negative arrhythmia, negative syncope   Gastrointestinal: negative abdominal pain, negative RUQ  (01/2025)    Elevated 1 Hour GTT w/ Normal 3 Hour GTT   - EFW 7#15 today   - Pelvis proven to 9#1    Advanced Maternal Age   - Normal anatomy US   - Genetic screening not completed    Short Interval Pregnancy   - G1 delivery via CS 7/24/2022    Breech (rslvd)   - Fetus noted to be breech on anatomy US (04/2024) and most recent MFM scan (06/2024)   - BSUS on 8/6/24 showed vertex presentation   - BSUS on admission: transverse maternal right     Family History of Polydactyly   - Fhx Polydactyly in FOB's nephew   - Normal anatomy US     BMI 37         Patient Active Problem List    Diagnosis Date Noted    Childhood Asthma 07/26/2022     Priority: Medium    PLTCS 7/24/22 M Apg 8/9 Wt 9#0 07/24/2022     Priority: Medium    39 weeks gestation of pregnancy 08/29/2024    FHx Polydactyly 08/28/2024    Hx C/S x 1 08/06/2024     Desires Repeat C/S      Antepartum multigravida of advanced maternal age 08/06/2024    Need for Tdap vaccination 06/11/2024    Elevated 1 hr GTT, normal 3 hr GTT 06/05/2024     Passed 3 hour GTT      Abnormal Pap Smear 05/02/2024 1/26/24 ASCUS with negative HPV      BREECH (RSLVD) 05/02/2024     Bedside US 8/6/24 shows Vertex presentation      G2- Short interval between pregnancies 05/02/2024     Delivered July 2022      Intraoperative hemorrhage 07/26/2022     QBL 1092 mL  Hgb 12.4>7.7         Plan discussed with Dr. Killian, who is agreeable.     Steroids given this admission: No    Risks, benefits, alternatives and possible complications have been discussed in detail with the patient. Admission, and post admission procedures and expectations were discussed in detail. All questions were answered.    Attending's Name: Dr. Maria D Escalante MD  Ob/Gyn Resident  8/29/2024, 7:37 AM    Attending Physician Statement  I have personally seen, evaluated and discussed the care of Adrienne Oliveira, including pertinent history and exam findings with the resident. I have reviewed and edited their note in

## 2024-08-29 NOTE — CARE COORDINATION
ANTEPARTUM NOTE    History of  [Z98.891]  39 weeks gestation of pregnancy [Z3A.39]    Adrienne was admitted to L&D on 24 for scheduled repeat C/S @ 39w5d    OB GYN Provider: Dr. Alexander    Will meet with patient after delivery to verify name/address/phone/insurance and discuss discharge planning.     Anticipate DC home 2 nights after vaginal delivery or 4 nights after C/S delivery as long as hemodynamically stable.

## 2024-08-29 NOTE — FLOWSHEET NOTE
Patient arrived on L&D unit for a scheduled C/S, ambulatory, accompanied by .      Patient reports +FM, denies vaginal bleeding, denies leaking of fluid.    Given hospital gown and instructed on urine specimen collection.      Patient states she showered last night and this morning with CHG wash.    Given CHG wash for abdomen.

## 2024-08-29 NOTE — CARE COORDINATION
Social Work     Sw reviewed medical record (current active problem list) and tox screens and found no current concerns.     Sw spoke with mom and dad briefly to explain Sw role, inquire if any needs or concerns, and provide safe sleep education and discuss.  Mom denied any needs or questions and informs baby has a safe sleep environment (pnp with bass).     Mom denied any current s/s of anxiety or depression and is aware to reach out to OB if any s/s occur after dc.     Mom reports a really good support system, fob has 2 sisters that reside very close, and denied any current questions or needs.      Mom reports this is her 2nd child, she also has a 2 year old son.       Mom states ped will be [facility].      Sw encouraged parents to reach out if any issues or concerns arise.

## 2024-08-29 NOTE — CONSULTS
Mom is planning to formula feed her infant. There is no need for a lactation consult at this time.

## 2024-08-29 NOTE — ANESTHESIA POSTPROCEDURE EVALUATION
POST- ANESTHESIA EVALUATION       Pt Name: Adrienne Oliveira  MRN: [id number]  YOB: 1990  Date of evaluation: 2024  Time:  12:07 PM      /67   Pulse 70   Temp 97.6 °F (36.4 °C) (Oral)   Resp 16   LMP 2023 (Exact Date)   SpO2 100%   Breastfeeding Unknown      Consciousness Level  Awake  Cardiopulmonary Status  Stable  Pain Adequately Treated YES  Nausea / Vomiting  NO  Adequate Hydration  YES  Anesthesia Related Complications NONE      Electronically signed by Nilesh Rogers MD on 2024 at 12:07 PM     Department of Anesthesiology  Postprocedure Note    Patient: Adrienne Oliveira  MRN: [id number]  YOB: 1990  Date of evaluation: 2024    Procedure Summary       Date: 24 Room / Location: \[facility]\""&D [address]    Anesthesia Start: 807 Anesthesia Stop: 933    Procedure:  SECTION Diagnosis:       History of       (History of  [Z98.891])    Surgeons: Hue Melchor DO Responsible Provider: Nilesh Rogers MD    Anesthesia Type: Spinal ASA Status: 2            Anesthesia Type: Spinal    Emma Phase I: Emma Score: 9    Emma Phase II: Emma Score: 10    Anesthesia Post Evaluation    No notable events documented.

## 2024-08-29 NOTE — FLOWSHEET NOTE
Received into room 733 per bed from recovery room accompanied by nurse and spouse. Oriented to room and call light with understanding voiced.

## 2024-08-29 NOTE — OP NOTE
Operative Note  Department of Obstetrics and Gynecology  [facility]     Patient: Adrienne Oliveira   : 1990  MRN: [id number]       Acct: [id number]   PCP: Juan Gaviria MD  Date of Procedure: 24    Pre-operative Diagnosis:   Repeat  Section   Hx  Section x 1 - Declined TOLAC   Abnormal Pap Smear    Elevated 1 hr GTT, Normal 3 hr GTT   Advanced Maternal Age  Short Interval Pregnancy   Family Hx Polydactyly   BMI 37     Post-operative Diagnosis:    Living Infant   Repeat  Section   Hx  Section x 1 - Declined TOLAC   Abnormal Pap Smear    Elevated 1 hr GTT, Normal 3 hr GTT   Advanced Maternal Age  Short Interval Pregnancy   Family Hx Polydactyly   BMI 37     Procedure: repeat low transverse  section    Indications: Adrienne Oliveira is a 33 year old  at 39 weeks and 5 days gestational age with a history of c/section x1. Risks and benefits of TOLAC vs repeat  section were discussed and patient declined TOLAC and decided on repeat  section. Consent was signed. All patients questions were answered. She received Ancef & TXA preoperatively.     Surgeon: Dr. Killian   Assistant(s): Dea Acosta, PGY3, Stefanie Escalante PGY1      Anesthesia: spinal with Duramorph    Procedure Details   The patient was seen pre-operatively. The risks, benefits, complications, treatment options, and expected outcomes were discussed with the patient. The patient concurred with the proposed plan, giving informed consent. The patient was taken to the Operating Room, identified as Adrienne Oliveira and the procedure verified as  Delivery. A Time Out was held and the above information confirmed.     After spinal anesthesia, the patient was prepped and draped in the usual sterile manner. A Pfannenstiel incision was made and carried down through the subcutaneous tissue to the fascia using scalpel. Extremely mild scar tissue/ pelvic adhesive disease was  irrigated copiously. The skin was reapproximated with a 4-0 Monocryl. The skin was then cleansed and dressed with a Silver dressing in sterile fashion.     Instrument, sponge, and needle counts were correct prior the abdominal closure and at the conclusion of the case. The urine remained clear throughout the case.  Ancef was given for antibiotic prophylaxis.  SCDs for DVT prophylaxis remain in place for the post operative period.     Dr. Killian was present for the entire operation.    Findings:  Live Born 8 lb 5 oz female infant in cephalic presentation with Apgars of 8 at 1 minute and 9 at five minutes, normal appearing uterus tubes and ovaries   Quantitative Blood Loss: 495 ml  Total IV Fluids: 1000ml  Urine output: 100 ml clear urine   Drains:  rachel catheter  Specimens:  cord blood and cord gases  Instrument and Sponge Count: Correct  Complications: none  Condition: Infant stable, transfer to General Care Nursery, Mother stable, transfer to post anesthesia recovery      Dea Acosta DO  OB/GYN Resident  8/29/2024, 10:06 AM    This dictation was performed by a resident physician and then was thoroughly reviewed by the attending prior to being signed.     Attending Attestation:   I was present and scrubbed for the entire procedure.     Hue Camardo Melchor, DO  [facility]  Date: 8/30/2024  Time: 9:47 AM

## 2024-08-29 NOTE — DISCHARGE SUMMARY
Obstetric Discharge Summary  [facility]    Patient Name: Adrienne Oliveira  Patient : 1990  Primary Care Physician: Juan Gaviria MD  Admit Date: 2024    Principal Diagnosis: IUP at 39w5d, admitted for scheduled repeat  Section     Her pregnancy has been complicated by:   Patient Active Problem List   Diagnosis    PLTCS 22 M Apg 8/9 Wt 9#0    Intraoperative hemorrhage    Childhood Asthma    Abnormal Pap Smear    BREECH (RSLVD)    G2- Short interval between pregnancies    Elevated 1 hr GTT, normal 3 hr GTT    Need for Tdap vaccination    Hx C/S x 1    Antepartum multigravida of advanced maternal age    FHx Polydactyly    39 weeks gestation of pregnancy    RLTCS 24 F Apg */* Wt 8#5    High-risk pregnancy in third trimester    Post-operative state       Infection Present?: No  Hospital Acquired: N/A    Surgical Operations & Procedures:  Analgesia: spinal  Delivery Type:  Delivery: See Labor and Delivery Summary   Laceration(s): Absent    Consultations: NICU, and Anesthesia    Pertinent Findings & Procedures:   Adrienne Oliveira is a 33 y.o. female  at 39w5d admitted for scheduled repeat CS 2/2 hx CS x1, declines TOLAC; received Ancef 2g, Bicitra, Tylenol, Pepcid, Scopolamine Patch, TXA preoperatively.     She delivered by repeat low transverse  a Live Born infant on 24.       Information for the patient's :  Lynnette Oliveira [[id number]]   female   Birth Weight: 3.785 kg (8 lb 5.5 oz)    Apgars: 8 at 1 minute and 9 at 5 minutes.     ERAS for  Section  Received ERAS education outpatient: Yes  Consumed electrolyte-rich clear fluid prior to surgery: No  Received pre-operative Tylenol and Pepcid: Yes  Received Scopolamine patch: Yes  Received post-operative scheduled Motrin and Tylenol: Yes  Stephens catheter discontinued 12 hours post-operatively: Yes        ERAS requirements met (3/6): Yes    Postpartum course:     POD#1: Hgb  11.1    Course of patient: uncomplicated    Discharge to: Home    Readmission planned: no     Indication for 6 week PP 2 hour GTT?: no     Eligible for 2 week PP virtual visit? no - private patient    Recommendations on Discharge:     Medications:      Medication List        START taking these medications      acetaminophen 500 MG tablet  Commonly known as: TYLENOL  Take 2 tablets by mouth every 6 hours as needed for Pain     ibuprofen 600 MG tablet  Commonly known as: ADVIL;MOTRIN  Take 1 tablet by mouth every 6 hours as needed for Pain     oxyCODONE 5 MG immediate release tablet  Commonly known as: Roxicodone  Take 1 tablet by mouth every 6 hours as needed for Pain for up to 7 days. Intended supply: 7 days. Take lowest dose possible to manage pain Max Daily Amount: 20 mg     senna 8.6 MG tablet  Commonly known as: Senokot  Take 1 tablet by mouth 2 times daily            CONTINUE taking these medications      omeprazole 40 MG delayed release capsule  Commonly known as: PRILOSEC  Take 1 capsule by mouth every morning (before breakfast)     PRENATAL PO               Where to Get Your Medications        These medications were sent to [address] [phone] - F [phone]  [address] [place] [address]      Phone: [phone]   acetaminophen 500 MG tablet  ibuprofen 600 MG tablet  oxyCODONE 5 MG immediate release tablet  senna 8.6 MG tablet           Activity: pelvic rest x 6 weeks, no driving on narcotics, no lifting greater than 15 lbs  Diet: regular diet  Follow up: 1 week for Silver dressing removal    Condition on discharge: stable    Discharge date: 08/31/24      May Strange DO  Ob/Gyn Resident    Comments:  Home care and follow-up care were reviewed.  Pelvic rest, and birth control were reviewed. Signs and symptoms of mastitis and post partum depression were reviewed. The patient is to notify her physician if any of these occur. The patient was

## 2024-08-29 NOTE — BRIEF OP NOTE
Department of Obstetrics and Gynecology  Obstetrical Brief Operative Report  [facility]    Patient: Adrienne Oliveira   : 1990  MRN: [id number]       Acct: [id number]   Date of Procedure: 24    Pre-operative Diagnosis:   33 y.o. female  at 39w5d   Repeat  Section   Hx  Section x 1 - Declined TOLAC   Abnormal Pap Smear    Elevated 1 hr GTT, Normal 3 hr GTT   Advanced Maternal Age  Short Interval Pregnancy   Family Hx Polydactyly   BMI 37      Post-operative Diagnosis:    Living Infant   Repeat  Section   Hx  Section x 1 - Declined TOLAC   Abnormal Pap Smear    Elevated 1 hr GTT, Normal 3 hr GTT   Advanced Maternal Age  Short Interval Pregnancy   Family Hx Polydactyly   BMI 37    Procedure: repeat low transverse  section    Surgeon: Dr. Killian   Assistant(s): Dea Acosta, PGY3, Stefanie Escalante PGY1     Anesthesia: spinal with Duramorph    Information for the patient's :  Lynnette Oliveira [[id number]]   female   Birth Weight: 3.785 kg (8 lb 5.5 oz)  Information for the patient's :  Lynnette Oliveira [[id number]]        Findings:  Live Born 8 lb 5 oz female infant in cephalic presentation with Apgars of 8 at 1 minute and 9 at five minutes, normal appearing uterus tubes and ovaries   Estimated Blood Loss: Pending immediately post-operatively  Total IV Fluids: 1000ml  Urine output: 100 ml clear urine   Drains:  rachel catheter  Specimens:  cord blood and cord gases  Instrument and Sponge Count: Correct  Complications: none  Condition: Infant stable, transfer to General Care Nursery, Mother stable, transfer to post anesthesia recovery    See operative report for full details.    Dea Acosta DO  Ob/Gyn Resident  2024, 10:03 AM    Attending Attestation:   I was present and scrubbed for the entire procedure.    DO Cherrie [facility]  Date: 2024  Time: 12:07 PM

## 2024-08-29 NOTE — FLOWSHEET NOTE
Dr. Escalante at bedside with portable ultrasound.  Pre op teaching reviewed per writer. Instructed on pre surgery meds.

## 2024-08-30 LAB
ERYTHROCYTE [DISTWIDTH] IN BLOOD BY AUTOMATED COUNT: 13.7 % (ref 11.8–14.4)
HCT VFR BLD AUTO: 35.6 % (ref 36.3–47.1)
HGB BLD-MCNC: 11.1 G/DL (ref 11.9–15.1)
MCH RBC QN AUTO: 27.7 PG (ref 25.2–33.5)
MCHC RBC AUTO-ENTMCNC: 31.2 G/DL (ref 28.4–34.8)
MCV RBC AUTO: 88.8 FL (ref 82.6–102.9)
NRBC BLD-RTO: 0 PER 100 WBC
PLATELET # BLD AUTO: 155 K/UL (ref 138–453)
PMV BLD AUTO: 9.9 FL (ref 8.1–13.5)
RBC # BLD AUTO: 4.01 M/UL (ref 3.95–5.11)
SURGICAL PATHOLOGY REPORT: NORMAL
WBC OTHER # BLD: 11.2 K/UL (ref 3.5–11.3)

## 2024-08-30 PROCEDURE — 2580000003 HC RX 258

## 2024-08-30 PROCEDURE — 6360000002 HC RX W HCPCS

## 2024-08-30 PROCEDURE — 6370000000 HC RX 637 (ALT 250 FOR IP)

## 2024-08-30 PROCEDURE — 85027 COMPLETE CBC AUTOMATED: CPT

## 2024-08-30 PROCEDURE — 1220000000 HC SEMI PRIVATE OB R&B

## 2024-08-30 PROCEDURE — 36415 COLL VENOUS BLD VENIPUNCTURE: CPT

## 2024-08-30 RX ADMIN — IBUPROFEN 600 MG: 600 TABLET, FILM COATED ORAL at 23:45

## 2024-08-30 RX ADMIN — CEPHALEXIN 500 MG: 500 CAPSULE ORAL at 20:58

## 2024-08-30 RX ADMIN — METRONIDAZOLE 500 MG: 500 TABLET ORAL at 05:41

## 2024-08-30 RX ADMIN — ENOXAPARIN SODIUM 40 MG: 100 INJECTION SUBCUTANEOUS at 10:51

## 2024-08-30 RX ADMIN — SENNOSIDES AND DOCUSATE SODIUM 1 TABLET: 50; 8.6 TABLET ORAL at 10:52

## 2024-08-30 RX ADMIN — SODIUM CHLORIDE, PRESERVATIVE FREE 10 ML: 5 INJECTION INTRAVENOUS at 10:47

## 2024-08-30 RX ADMIN — KETOROLAC TROMETHAMINE 30 MG: 30 INJECTION, SOLUTION INTRAMUSCULAR; INTRAVENOUS at 05:41

## 2024-08-30 RX ADMIN — KETOROLAC TROMETHAMINE 30 MG: 30 INJECTION, SOLUTION INTRAMUSCULAR; INTRAVENOUS at 10:47

## 2024-08-30 RX ADMIN — ACETAMINOPHEN 1000 MG: 500 TABLET ORAL at 13:54

## 2024-08-30 RX ADMIN — METRONIDAZOLE 500 MG: 500 TABLET ORAL at 20:58

## 2024-08-30 RX ADMIN — ACETAMINOPHEN 1000 MG: 500 TABLET ORAL at 02:19

## 2024-08-30 RX ADMIN — METRONIDAZOLE 500 MG: 500 TABLET ORAL at 13:54

## 2024-08-30 RX ADMIN — CEPHALEXIN 500 MG: 500 CAPSULE ORAL at 13:54

## 2024-08-30 RX ADMIN — CEPHALEXIN 500 MG: 500 CAPSULE ORAL at 05:41

## 2024-08-30 RX ADMIN — IBUPROFEN 600 MG: 600 TABLET, FILM COATED ORAL at 16:01

## 2024-08-30 RX ADMIN — ACETAMINOPHEN 1000 MG: 500 TABLET ORAL at 20:58

## 2024-08-30 RX ADMIN — SODIUM CHLORIDE, PRESERVATIVE FREE 10 ML: 5 INJECTION INTRAVENOUS at 20:59

## 2024-08-30 ASSESSMENT — PAIN DESCRIPTION - LOCATION
LOCATION: ABDOMEN
LOCATION: INCISION

## 2024-08-30 ASSESSMENT — PAIN DESCRIPTION - ORIENTATION
ORIENTATION: LOWER
ORIENTATION: MID;LOWER
ORIENTATION: LOWER;MID
ORIENTATION: MID;LOWER

## 2024-08-30 ASSESSMENT — PAIN - FUNCTIONAL ASSESSMENT
PAIN_FUNCTIONAL_ASSESSMENT: ACTIVITIES ARE NOT PREVENTED

## 2024-08-30 ASSESSMENT — PAIN SCALES - GENERAL
PAINLEVEL_OUTOF10: 1
PAINLEVEL_OUTOF10: 2
PAINLEVEL_OUTOF10: 1
PAINLEVEL_OUTOF10: 3
PAINLEVEL_OUTOF10: 2
PAINLEVEL_OUTOF10: 2

## 2024-08-30 ASSESSMENT — PAIN DESCRIPTION - DESCRIPTORS
DESCRIPTORS: DISCOMFORT;SORE
DESCRIPTORS: BURNING
DESCRIPTORS: DISCOMFORT;SORE

## 2024-08-30 NOTE — PLAN OF CARE
Problem: Postpartum  Goal: Experiences normal postpartum course  2024 by Tonja Flores RN  Outcome: Progressing  2024 by Doreen Jimenez RN  Outcome: Progressing  Goal: Appropriate maternal -  bonding  2024 by Tonja Flores RN  Outcome: Progressing  2024 by Doreen Jimenez RN  Outcome: Progressing  Goal: Establishment of infant feeding pattern  2024 by Tonja Flores RN  Outcome: Progressing  2024 by Doreen Jimenez RN  Outcome: Progressing  Goal: Incisions, wounds, or drain sites healing without S/S of infection  2024 by Tonja Flores RN  Outcome: Progressing  Flowsheets (Taken 2024 0948 by Jarred Dooley)  Incisions, Wounds, or Drain Sites Healing Without Sign and Symptoms of Infection: TWICE DAILY: Assess and document skin integrity  2024 by Doreen Jimenez RN  Outcome: Progressing     Problem: Pain  Goal: Verbalizes/displays adequate comfort level or baseline comfort level  2024 by Tonja Flores RN  Outcome: Progressing  Flowsheets (Taken 2024 0948 by Jarred Dooley)  Verbalizes/displays adequate comfort level or baseline comfort level: Encourage patient to monitor pain and request assistance  2024 by Doreen Jimenez RN  Outcome: Progressing     Problem: Infection - Adult  Goal: Absence of infection at discharge  2024 by Tonja Flores RN  Outcome: Progressing  2024 by Doreen Jimenez RN  Outcome: Progressing  Goal: Absence of infection during hospitalization  2024 by Tonja Flores RN  Outcome: Progressing  2024 by Doreen Jimenez RN  Outcome: Progressing  Goal: Absence of fever/infection during anticipated neutropenic period  2024 by Tonja Flores RN  Outcome: Progressing  2024 by Barbara, Doreen, RN  Outcome: Progressing     Problem: Safety - Adult  Goal: Free from fall

## 2024-08-30 NOTE — PLAN OF CARE
Problem: Postpartum  Goal: Experiences normal postpartum course  Description:  Postpartum OB-Pregnancy care plan goal which identifies if the mother is experiencing a normal postpartum course  2024 by Doreen Jimenez RN  Outcome: Progressing  2024 by Tonja Flores RN  Outcome: Progressing  Goal: Appropriate maternal -  bonding  Description:  Postpartum OB-Pregnancy care plan goal which identifies if the mother and  are bonding appropriately  2024 by Doreen Jimenez RN  Outcome: Progressing  2024 by Tonja Flores RN  Outcome: Progressing  Goal: Establishment of infant feeding pattern  Description:  Postpartum OB-Pregnancy care plan goal which identifies if the mother is establishing a feeding pattern with their   2024 by Doreen Jimenez RN  Outcome: Progressing  2024 by Tonja Flores RN  Outcome: Progressing  Goal: Incisions, wounds, or drain sites healing without S/S of infection  2024 by Doreen Jimenez RN  Outcome: Progressing  2024 by Tonja Flores RN  Outcome: Progressing     Problem: Pain  Goal: Verbalizes/displays adequate comfort level or baseline comfort level  2024 by Doreen Jimenez RN  Outcome: Progressing  2024 by Tonja Flores RN  Outcome: Progressing     Problem: Infection - Adult  Goal: Absence of infection at discharge  2024 by Doreen Jimenez RN  Outcome: Progressing  2024 by Tonja Flores RN  Outcome: Progressing  Goal: Absence of infection during hospitalization  2024 by Doreen Jimenez RN  Outcome: Progressing  2024 by Tonja Flores RN  Outcome: Progressing  Goal: Absence of fever/infection during anticipated neutropenic period  2024 by Doreen Jimenez RN  Outcome: Progressing  2024 by Tonja Flores RN  Outcome: Progressing     Problem: Safety - Adult  Goal:

## 2024-08-30 NOTE — PROGRESS NOTES
POST OPERATIVE DAY # 1    Adrienne Oliveira is a 33 y.o. female POD#1 s/p RLTCS on 8/29/24  This patient was seen and examined today.     Her pregnancy was complicated by:   Patient Active Problem List   Diagnosis    PLTCS 7/24/22 M Apg 8/9 Wt 9#0    Intraoperative hemorrhage    Childhood Asthma    Abnormal Pap Smear    BREECH (RSLVD)    G2- Short interval between pregnancies    Elevated 1 hr GTT, normal 3 hr GTT    Need for Tdap vaccination    Hx C/S x 1    Antepartum multigravida of advanced maternal age    FHx Polydactyly    39 weeks gestation of pregnancy    RLTCS 8/29/24 F Apg */* Wt 8#5    High-risk pregnancy in third trimester    Post-operative state       Today she is doing well without any chief complaint. Her lochia is light. She denies chest pain, shortness of breath, headache, lightheadedness, and blurred vision. She is bottle feeding and she denies any signs or symptoms of mastitis.  She is ambulating well. She is voiding without difficulty. She currently denies S/S of postpartum depression. Flatus present.  Bowel movement absent. She is tolerating solids.    Vital Signs:  Vitals:    08/29/24 1558 08/29/24 1936 08/29/24 2300 08/30/24 0222   BP: 112/71 (!) 99/58 (!) 101/59 (!) 102/58   Pulse: 87 67 80 75   Resp: 16 17 19 18   Temp: 97.7 °F (36.5 °C) 98.7 °F (37.1 °C) 98.1 °F (36.7 °C) 98 °F (36.7 °C)   TempSrc: Oral Oral Oral Oral   SpO2: 97% 97% 97% 98%     Urine Input & Output last 24hrs:     Intake/Output Summary (Last 24 hours) at 8/30/2024 0702  Last data filed at 8/29/2024 1737  Gross per 24 hour   Intake 2328.6 ml   Output 2545 ml   Net -216.4 ml       Physical Exam:  General:  no apparent distress, alert, and cooperative  Neurologic:  alert, oriented, normal speech, no focal findings or movement disorder noted  Lungs:  No increased work of breathing  Heart:  regular rate   Abdomen: abdomen soft, non-distended, non-tender  Fundus: non-tender, normal size, firm, below umbilicus  Incision: clean, dry,  Modifier.  This service has been performed in part by a resident under the direction of a teaching physician.        Attending's Name:  Maria L Hernandez DO

## 2024-08-31 VITALS
SYSTOLIC BLOOD PRESSURE: 113 MMHG | RESPIRATION RATE: 16 BRPM | DIASTOLIC BLOOD PRESSURE: 65 MMHG | TEMPERATURE: 97.8 F | OXYGEN SATURATION: 100 % | HEART RATE: 83 BPM

## 2024-08-31 PROCEDURE — 6360000002 HC RX W HCPCS

## 2024-08-31 PROCEDURE — 6370000000 HC RX 637 (ALT 250 FOR IP)

## 2024-08-31 RX ADMIN — ENOXAPARIN SODIUM 40 MG: 100 INJECTION SUBCUTANEOUS at 08:51

## 2024-08-31 RX ADMIN — ACETAMINOPHEN 1000 MG: 500 TABLET ORAL at 05:32

## 2024-08-31 RX ADMIN — IBUPROFEN 600 MG: 600 TABLET, FILM COATED ORAL at 10:20

## 2024-08-31 RX ADMIN — CEPHALEXIN 500 MG: 500 CAPSULE ORAL at 05:32

## 2024-08-31 RX ADMIN — SENNOSIDES AND DOCUSATE SODIUM 1 TABLET: 50; 8.6 TABLET ORAL at 08:51

## 2024-08-31 RX ADMIN — METRONIDAZOLE 500 MG: 500 TABLET ORAL at 05:32

## 2024-08-31 ASSESSMENT — PAIN DESCRIPTION - ORIENTATION
ORIENTATION: LOWER
ORIENTATION: MID;LOWER

## 2024-08-31 ASSESSMENT — PAIN DESCRIPTION - DESCRIPTORS
DESCRIPTORS: DISCOMFORT;SORE
DESCRIPTORS: CRAMPING;DISCOMFORT

## 2024-08-31 ASSESSMENT — PAIN SCALES - GENERAL: PAINLEVEL_OUTOF10: 3

## 2024-08-31 ASSESSMENT — PAIN DESCRIPTION - LOCATION
LOCATION: INCISION
LOCATION: ABDOMEN

## 2024-08-31 NOTE — DISCHARGE INSTRUCTIONS
crib on their back and take a break.  NEVER shake your infant.      BLEEDING  Vaginal bleeding will decrease in amount over the next few weeks.  You will notice that as your activity increases, your flow may increase.  This is your body's way of telling you, you need take things easier and rest more often.  Call your OB/ER if you are saturating one maxi pad in an hour & passing large clots.    BREAST CARE  Take medications as recommended by your doctor or midwife for pain  If you develop a warm, red, tender area on your breast or develop a fever contact your lactation consultant.         For breastfeeding moms:  If you become engorged, feeding may be more difficult or painful for 1-2 days.  You may find it helpful to hand express some milk so that the infant can latch on more easily.   While breastfeeding, continue to take your prenatal vitamins as directed by your doctor or midwife.   Refer to the breastfeeding booklet in the  folder/binder for more information.  For any questions or concerns contact a Lactation Consultant.  Leave a message and your call will be returned.    For NON-breastfeeding moms:  You may apply ice packs to your breasts over you bra for twenty minutes at a time for comfort. Cabbage leaves may be applied to breasts, replace when wilted.  Avoid stimulation to your breasts, when showering allow the water to strike your back not your breasts.  Do not express milk or your body will make more.  Wear a good fitting bra until your milk dries, such as a sports bra.    INCISIONAL CARE / JOSELUIS CARE  Shower daily, cleansing incision and perineum with mild soap.  After shower pat the incision area dry and allow the area open to air.   To keep the incision dry, and if necessary, you may dry it with a hair dryer on the cool setting.  If used, Steri-stipes should fall off by 2 weeks.  If not please remove them when you are in the shower.  Do not briskly pull at strips.  If used, Staples should be  removed by the OB office by 1 week.  If used/ordered, an abdominal binder may provide support for your incision.   Use the lindsey-bottle until bleeding stops each time you use the restroom.    Cleanse your perineum from front to back.  If used, stitches will dissolve in 4-6 weeks.  You may use a sitz bath or soak in a clean tub with drain open and water running for comfort.  Kegel exercises will help restore bladder control.     SWELLING  Try to keep your legs elevated when you are sitting.   When lying down keep your legs elevated.    CALL THE DOCTOR WITH THESE HEALTH CONCERNS OR PROBLEMS  If you have a temp of 100.4 or more.   If your bleeding has increased and you are saturating a pad in an hour.  Your abdomen is tender to touch.  You are passing blood clots bigger than the size of a lemon.  If you are experiencing extreme weakness or dizziness.   If you are having flu-like symptoms such as achy muscles or joints.  There is a foul smell or a green color to your vaginal bleeding.  If you have pain that cannot be relieved.  You have persistent burning with urination or frequency.   Call if you have concerns about your well-being.  You are unable to sleep, eat, or are having thoughts of harming yourself or your baby.   You have swelling, bleeding, drainage, foul odor, redness, or warmth in/around your incision or stitches.  You have a red, warm, tender area in you calf.

## 2024-08-31 NOTE — FLOWSHEET NOTE
Pt discharged to private residence via wheelchair in stable condition with belongings  Discharge instructions given  \"Meds To Beds\" medication at bedside  Pt denies having any further questions at this time  personal items given to patient at discharge  Patient/family state they have everything they were admitted with.  hibiclens sent home with patient

## 2024-08-31 NOTE — PLAN OF CARE
Problem: Postpartum  Goal: Experiences normal postpartum course  Description:  Postpartum OB-Pregnancy care plan goal which identifies if the mother is experiencing a normal postpartum course  2024 by Jada Dolan RN  Outcome: Completed  2024 by Doreen Jimenez RN  Outcome: Progressing  Goal: Appropriate maternal -  bonding  Description:  Postpartum OB-Pregnancy care plan goal which identifies if the mother and  are bonding appropriately  2024 by Jada Dolan RN  Outcome: Completed  2024 by Doreen Jimenez RN  Outcome: Adequate for Discharge  Goal: Establishment of infant feeding pattern  Description:  Postpartum OB-Pregnancy care plan goal which identifies if the mother is establishing a feeding pattern with their   2024 by Jada Dolan RN  Outcome: Completed  2024 by Doreen Jimenez RN  Outcome: Adequate for Discharge  Goal: Incisions, wounds, or drain sites healing without S/S of infection  2024 by Jada Dolan RN  Outcome: Completed  2024 by Doreen Jimenez RN  Outcome: Progressing     Problem: Pain  Goal: Verbalizes/displays adequate comfort level or baseline comfort level  2024 by Jada Dolan RN  Outcome: Completed  2024 by Doreen Jimenez RN  Outcome: Adequate for Discharge     Problem: Infection - Adult  Goal: Absence of infection at discharge  2024 by Jada Dolan RN  Outcome: Completed  2024 by Doreen Jimenez RN  Outcome: Adequate for Discharge  Goal: Absence of infection during hospitalization  2024 by Jada Dolan RN  Outcome: Completed  2024 by Doreen Jimenez RN  Outcome: Adequate for Discharge  Goal: Absence of fever/infection during anticipated neutropenic period  2024 by Jada Dolan RN  Outcome: Completed  2024 by Doreen Jimenez RN  Outcome: Adequate

## 2024-08-31 NOTE — PLAN OF CARE
Problem: Postpartum  Goal: Experiences normal postpartum course  Description:  Postpartum OB-Pregnancy care plan goal which identifies if the mother is experiencing a normal postpartum course  2024 05 by Doreen Jimenez RN  Outcome: Progressing  2024 by Tonja Flores RN  Outcome: Progressing  Goal: Incisions, wounds, or drain sites healing without S/S of infection  2024 05 by Doreen Jimenez RN  Outcome: Progressing  2024 by Tonja Flores RN  Outcome: Progressing  Flowsheets (Taken 2024 0948 by Jarred Dooley)  Incisions, Wounds, or Drain Sites Healing Without Sign and Symptoms of Infection: TWICE DAILY: Assess and document skin integrity     Problem: Safety - Adult  Goal: Free from fall injury  2024 by Doreen Jimenez RN  Outcome: Progressing  2024 by Tonja Flores RN  Outcome: Progressing     Problem: Discharge Planning  Goal: Discharge to home or other facility with appropriate resources  2024 by Doreen Jimenez RN  Outcome: Progressing  2024 by Tonja Flores RN  Outcome: Progressing     Problem: Postpartum  Goal: Appropriate maternal -  bonding  Description:  Postpartum OB-Pregnancy care plan goal which identifies if the mother and  are bonding appropriately  2024 05 by Doreen Jimenez RN  Outcome: Adequate for Discharge  2024 by Tonja Flores RN  Outcome: Progressing  Goal: Establishment of infant feeding pattern  Description:  Postpartum OB-Pregnancy care plan goal which identifies if the mother is establishing a feeding pattern with their   2024 05 by Doreen Jimenez RN  Outcome: Adequate for Discharge  2024 by Tonja Flores RN  Outcome: Progressing     Problem: Pain  Goal: Verbalizes/displays adequate comfort level or baseline comfort level  2024 by Doreen Jimenez RN  Outcome: Adequate for Discharge  2024

## 2024-08-31 NOTE — PROGRESS NOTES
POST OPERATIVE DAY # 2    Adrienne Oliveira is a 33 y.o. female POD#1 s/p RLTCS on 8/29/24  This patient was seen and examined today.     Her pregnancy was complicated by:   Patient Active Problem List   Diagnosis    PLTCS 7/24/22 M Apg 8/9 Wt 9#0    Intraoperative hemorrhage    Childhood Asthma    Abnormal Pap Smear    BREECH (RSLVD)    G2- Short interval between pregnancies    Elevated 1 hr GTT, normal 3 hr GTT    Need for Tdap vaccination    Hx C/S x 1    Antepartum multigravida of advanced maternal age    FHx Polydactyly    39 weeks gestation of pregnancy    RLTCS 8/29/24 F Apg */* Wt 8#5    High-risk pregnancy in third trimester    Post-operative state       Today she is doing well without any chief complaint. Her lochia is light. She denies chest pain, shortness of breath, headache, lightheadedness, and blurred vision. She is bottle feeding and she denies any signs or symptoms of mastitis.  She is ambulating well. She is voiding without difficulty. She currently denies S/S of postpartum depression. Flatus present.  Bowel movement absent. She is tolerating solids.    Vital Signs:  Vitals:    08/30/24 1200 08/30/24 1600 08/30/24 2058 08/31/24 0532   BP: 120/81 94/69 116/70 110/70   Pulse: 66 84 75 69   Resp: 16 16 17 18   Temp: 98.3 °F (36.8 °C) 98.2 °F (36.8 °C) 97.7 °F (36.5 °C) 98 °F (36.7 °C)   TempSrc: Oral Tympanic Oral Oral   SpO2: 97%  98% 97%     Urine Input & Output last 24hrs:   No intake or output data in the 24 hours ending 08/31/24 0835      Physical Exam:  General:  no apparent distress, alert, and cooperative  Neurologic:  alert, oriented, normal speech, no focal findings or movement disorder noted  Lungs:  No increased work of breathing  Heart:  regular rate   Abdomen: abdomen soft, non-distended, non-tender  Fundus: non-tender, normal size, firm, below umbilicus  Incision: clean, dry, and intact  Extremities:  no calf tenderness, non edematous    Labs:  Lab Results   Component Value Date    WBC 11.2

## 2024-09-06 ENCOUNTER — POSTPARTUM VISIT (OUTPATIENT)
Dept: OBGYN CLINIC | Age: 34
End: 2024-09-06

## 2024-09-06 VITALS
HEIGHT: 64 IN | WEIGHT: 201 LBS | DIASTOLIC BLOOD PRESSURE: 85 MMHG | SYSTOLIC BLOOD PRESSURE: 124 MMHG | BODY MASS INDEX: 34.31 KG/M2 | HEART RATE: 90 BPM

## 2024-09-06 PROBLEM — Z98.890 POST-OPERATIVE STATE: Status: RESOLVED | Noted: 2024-08-29 | Resolved: 2024-09-06

## 2024-09-06 PROBLEM — Z23 NEED FOR TDAP VACCINATION: Status: RESOLVED | Noted: 2024-06-11 | Resolved: 2024-09-06

## 2024-09-06 PROBLEM — O09.529 ANTEPARTUM MULTIGRAVIDA OF ADVANCED MATERNAL AGE: Status: RESOLVED | Noted: 2024-08-06 | Resolved: 2024-09-06

## 2024-09-06 PROBLEM — O09.93 HIGH-RISK PREGNANCY IN THIRD TRIMESTER: Status: RESOLVED | Noted: 2024-08-29 | Resolved: 2024-09-06

## 2024-09-06 PROBLEM — Z3A.39 39 WEEKS GESTATION OF PREGNANCY: Status: RESOLVED | Noted: 2024-08-29 | Resolved: 2024-09-06

## 2024-09-06 PROBLEM — O99.810 ABNORMAL GLUCOSE TOLERANCE IN PREGNANCY: Status: RESOLVED | Noted: 2024-06-05 | Resolved: 2024-09-06

## 2024-09-06 PROCEDURE — 0503F POSTPARTUM CARE VISIT: CPT | Performed by: STUDENT IN AN ORGANIZED HEALTH CARE EDUCATION/TRAINING PROGRAM

## 2024-09-06 NOTE — PROGRESS NOTES
Postpartum Visit    Adrienne Oliveira is a 33 y.o. female , 1 week Post Partum s/p RLTCS on 24    The patient was seen and examined.    She is not breast feeding and denies signs or symptoms of mastitis.  The patient completed the E.P.D.S. Post Partum Depression Evaluation form and scored 0.  She does not have signs or symptoms of post partum depression. She denies any suicidal thoughts with a plan, intent to harm others, and delusional ideas. She does admit to having good home support. Today her lochia is light she denies any dizziness or shortness of breath.  Her bowels are regular and she denies any urinary tract symptomology. She is using abstinence for family planning and for STD prevention.    Her pregnancy was complicated by the following problems.  OB History    Para Term  AB Living   2 2 2 0 0 2   SAB IAB Ectopic Molar Multiple Live Births   0 0 0 0 0 2     Patient Active Problem List   Diagnosis    PLTCS 22 M Apg 8/ Wt 9#0    Intraoperative hemorrhage    Childhood Asthma    Abnormal Pap Smear    G2- Short interval between pregnancies    Hx C/S x 1    FHx Polydactyly    RLTCS 24 F Apg */* Wt 8#5       Vitals:   Blood pressure 124/85, pulse 90, height 1.626 m (5' 4.02\"), weight 91.2 kg (201 lb), last menstrual period 2023, not currently breastfeeding.    Physical Exam:  General:  no apparent distress, alert and cooperative  Lungs:  No increased work of breathing, good air exchange, clear to auscultation bilaterally, no crackles or wheezing  Heart:  regular rate and rhythm and no murmur    Abdomen: Abdomen soft, non-tender. BS normal. No masses,  No organomegaly  Fundus: non-tender, normal size, firm, below umbilicus  Perineum: not inspected  Incision: clean, dry, and intact, Silver dressing removed  Extremities:  no calf tenderness, non edematous    Assessment:  Adrienne Oliveira is a 33 y.o. female , 1 week Post Partum s/p RLTCS on 24   - Stable, continue

## 2024-10-11 ENCOUNTER — POSTPARTUM VISIT (OUTPATIENT)
Dept: OBGYN CLINIC | Age: 34
End: 2024-10-11

## 2024-10-11 VITALS
HEART RATE: 89 BPM | SYSTOLIC BLOOD PRESSURE: 116 MMHG | BODY MASS INDEX: 34.15 KG/M2 | DIASTOLIC BLOOD PRESSURE: 76 MMHG | HEIGHT: 64 IN | WEIGHT: 200 LBS

## 2024-10-11 RX ORDER — NORETHINDRONE ACETATE AND ETHINYL ESTRADIOL .02; 1 MG/1; MG/1
1 TABLET ORAL DAILY
Qty: 3 PACKET | Refills: 4 | Status: SHIPPED | OUTPATIENT
Start: 2024-10-11

## 2024-10-11 NOTE — PROGRESS NOTES
Postpartum Visit    Adrienne Oliveira is a 33 y.o. female , 6 weeks Post Partum s/p RLTCS on 24    The patient was seen and examined.    She is not breast feeding and denies signs or symptoms of mastitis.  The patient completed the E.P.D.S. Post Partum Depression Evaluation form and scored 0.  She does not have signs or symptoms of post partum depression. She denies any suicidal thoughts with a plan, intent to harm others, and delusional ideas. She does admit to having good home support. Today her lochia is light she denies any dizziness or shortness of breath.  Her bowels are regular and she denies any urinary tract symptomology. She desires OCPs for family planning.    Her pregnancy was complicated by the following problems.   OB History    Para Term  AB Living   2 2 2 0 0 2   SAB IAB Ectopic Molar Multiple Live Births   0 0 0 0 0 2     Patient Active Problem List   Diagnosis    PLTCS 22 M Apg 8/9 Wt 9#0    Intraoperative hemorrhage    Childhood Asthma    Abnormal Pap Smear    G2- Short interval between pregnancies    Hx C/S x 1    FHx Polydactyly    RLTCS 24 F Apg */* Wt 8#5       Vitals:   Blood pressure 116/76, pulse 89, height 1.626 m (5' 4.02\"), weight 90.7 kg (200 lb), not currently breastfeeding.    Physical Exam:  General:  no apparent distress, alert and cooperative  Lungs:  No increased work of breathing, good air exchange, clear to auscultation bilaterally, no crackles or wheezing  Heart:  regular rate and rhythm and no murmur    Abdomen: Abdomen soft, non-tender. BS normal. No masses,  No organomegaly  Fundus: non-tender, normal size, firm, below umbilicus  Incision: clean, dry, and intact  Extremities:  no calf tenderness, non edematous    Assessment:  Adrienne Oliveira is a 33 y.o. female , 6 weeks Post Partum s/p RLTCS on 24   - Stable, discontinue routine post partum care    Patient Active Problem List    Diagnosis Date Noted    Childhood Asthma 2022

## 2025-01-13 ENCOUNTER — OFFICE VISIT (OUTPATIENT)
Dept: OBGYN CLINIC | Age: 35
End: 2025-01-13
Payer: COMMERCIAL

## 2025-01-13 VITALS
WEIGHT: 203 LBS | BODY MASS INDEX: 34.66 KG/M2 | SYSTOLIC BLOOD PRESSURE: 131 MMHG | HEART RATE: 85 BPM | HEIGHT: 64 IN | DIASTOLIC BLOOD PRESSURE: 82 MMHG

## 2025-01-13 DIAGNOSIS — Z01.419 WELL WOMAN EXAM WITH ROUTINE GYNECOLOGICAL EXAM: Primary | ICD-10-CM

## 2025-01-13 PROCEDURE — 99395 PREV VISIT EST AGE 18-39: CPT | Performed by: STUDENT IN AN ORGANIZED HEALTH CARE EDUCATION/TRAINING PROGRAM

## 2025-01-13 PROCEDURE — 99459 PELVIC EXAMINATION: CPT | Performed by: STUDENT IN AN ORGANIZED HEALTH CARE EDUCATION/TRAINING PROGRAM

## 2025-01-13 RX ORDER — NORETHINDRONE ACETATE AND ETHINYL ESTRADIOL 1.5-30(21)
1 KIT ORAL DAILY
Qty: 3 PACKET | Refills: 4 | Status: SHIPPED | OUTPATIENT
Start: 2025-01-13

## 2025-01-13 NOTE — PROGRESS NOTES
[facility]  [facility]  [address]  [place] [address]      Adrienne Oliveira  2027                       Primary Care Physician: Juan Gaviria MD    CC:   Chief Complaint   Patient presents with    Annual Exam         HPI: Adrienne Oliveira is a 34 y.o. female  Patient's last menstrual period was 2025.    The patient was seen and examined. She is here for an annual visit. She is complaining of bleeding every 2 weeks with her OCPs.  This happened the last time she was started on this particular birth control pill.  She was given a new pill that had increased amounts of estrogen in it and this resolved.  She would like to start that again.      Her bowel habits are regular. She denies any bloating.  She denies dysuria. She denies urinary leaking.  She denies vaginal discharge.  She is sexually active with single partner, contraception - OCP (estrogen/progesterone).  She uses oral contraceptives (estrogen/progesterone) for contraception and is not desiring pregnancy.    Depression Screen: Symptoms of decreased mood absent  Symptoms of anhedonia absent  **If either question is answered in a  positive fashion then complete the PHQ9 Scoring Evaluation and make the appropriate referral**    REVIEW OF SYSTEMS:   Constitutional: negative fever, negative chills  HEENT: negative visual disturbances, negative headaches  Respiratory: negative dyspnea, negative cough  Cardiovascular: negative chest pain,  negative palpitations  Gastrointestinal: negative abdominal pain, negative RUQ pain, negative N/V, negative diarrhea, negative constipation  Genitourinary: negative dysuria, negative vaginal discharge  Dermatological: negative rash  Hematologic: negative bruising  Immunologic/Lymphatic: negative recent illness, negative recent sick contact  Musculoskeletal: negative back pain, negative myalgias, negative arthralgias  Neurological:  negative dizziness, negative

## (undated) DEVICE — KENDALL SCD EXPRESS SLEEVES, KNEE LENGTH, MEDIUM: Brand: KENDALL SCD

## (undated) DEVICE — SOLUTION SOD CHL 0.9% 1000ML

## (undated) DEVICE — SUTURE VCRL 3-0 L36IN ABSRB VLT CT-1 L36MM 1/2 CIR J344H

## (undated) DEVICE — Z DUP USE 2522782 SOLUTION IRRIG 1000ML STRL H2O PLAS CONTAINER UROMATIC

## (undated) DEVICE — TOWEL SURG W16XL26IN WHT NONFENESTRATED ST 2 PER PK

## (undated) DEVICE — SUTURE MCRYL SZ 0 L36IN ABSRB VLT L48MM CTX 1/2 CIR Y398H

## (undated) DEVICE — STAPLER SKIN L39MM DIA0.53MM CRWN 5.7MM S STL FIX HD PROX